# Patient Record
Sex: FEMALE | Race: BLACK OR AFRICAN AMERICAN | NOT HISPANIC OR LATINO | URBAN - METROPOLITAN AREA
[De-identification: names, ages, dates, MRNs, and addresses within clinical notes are randomized per-mention and may not be internally consistent; named-entity substitution may affect disease eponyms.]

---

## 2017-01-07 ENCOUNTER — EMERGENCY (EMERGENCY)
Facility: HOSPITAL | Age: 57
LOS: 1 days | Discharge: HOME | End: 2017-01-07
Attending: EMERGENCY MEDICINE
Payer: COMMERCIAL

## 2017-01-07 VITALS
HEART RATE: 104 BPM | WEIGHT: 225.09 LBS | TEMPERATURE: 98 F | DIASTOLIC BLOOD PRESSURE: 103 MMHG | SYSTOLIC BLOOD PRESSURE: 149 MMHG | OXYGEN SATURATION: 98 % | RESPIRATION RATE: 22 BRPM

## 2017-01-07 DIAGNOSIS — Z90.710 ACQUIRED ABSENCE OF BOTH CERVIX AND UTERUS: Chronic | ICD-10-CM

## 2017-01-07 DIAGNOSIS — Z90.49 ACQUIRED ABSENCE OF OTHER SPECIFIED PARTS OF DIGESTIVE TRACT: Chronic | ICD-10-CM

## 2017-01-07 DIAGNOSIS — Z90.710 ACQUIRED ABSENCE OF BOTH CERVIX AND UTERUS: ICD-10-CM

## 2017-01-07 DIAGNOSIS — Z90.49 ACQUIRED ABSENCE OF OTHER SPECIFIED PARTS OF DIGESTIVE TRACT: ICD-10-CM

## 2017-01-07 DIAGNOSIS — Z98.89 OTHER SPECIFIED POSTPROCEDURAL STATES: Chronic | ICD-10-CM

## 2017-01-07 DIAGNOSIS — Z98.890 OTHER SPECIFIED POSTPROCEDURAL STATES: ICD-10-CM

## 2017-01-07 DIAGNOSIS — R06.2 WHEEZING: ICD-10-CM

## 2017-01-07 DIAGNOSIS — J45.901 UNSPECIFIED ASTHMA WITH (ACUTE) EXACERBATION: ICD-10-CM

## 2017-01-07 DIAGNOSIS — I10 ESSENTIAL (PRIMARY) HYPERTENSION: ICD-10-CM

## 2017-01-07 PROCEDURE — 71045 X-RAY EXAM CHEST 1 VIEW: CPT

## 2017-01-07 PROCEDURE — 71010: CPT | Mod: 26

## 2017-01-07 PROCEDURE — 99284 EMERGENCY DEPT VISIT MOD MDM: CPT

## 2017-01-07 PROCEDURE — 94640 AIRWAY INHALATION TREATMENT: CPT

## 2017-01-07 PROCEDURE — 99284 EMERGENCY DEPT VISIT MOD MDM: CPT | Mod: 25

## 2017-01-07 RX ORDER — ALBUTEROL 90 UG/1
2.5 AEROSOL, METERED ORAL ONCE
Qty: 0 | Refills: 0 | Status: COMPLETED | OUTPATIENT
Start: 2017-01-07 | End: 2017-01-07

## 2017-01-07 RX ORDER — AZITHROMYCIN 500 MG/1
500 TABLET, FILM COATED ORAL ONCE
Qty: 0 | Refills: 0 | Status: COMPLETED | OUTPATIENT
Start: 2017-01-07 | End: 2017-01-07

## 2017-01-07 RX ORDER — IPRATROPIUM/ALBUTEROL SULFATE 18-103MCG
3 AEROSOL WITH ADAPTER (GRAM) INHALATION ONCE
Qty: 0 | Refills: 0 | Status: COMPLETED | OUTPATIENT
Start: 2017-01-07 | End: 2017-01-07

## 2017-01-07 RX ORDER — CARVEDILOL PHOSPHATE 80 MG/1
12.5 CAPSULE, EXTENDED RELEASE ORAL
Qty: 0 | Refills: 0 | COMMUNITY

## 2017-01-07 RX ADMIN — Medication 60 MILLIGRAM(S): at 22:59

## 2017-01-07 RX ADMIN — ALBUTEROL 2.5 MILLIGRAM(S): 90 AEROSOL, METERED ORAL at 21:47

## 2017-01-07 RX ADMIN — ALBUTEROL 2.5 MILLIGRAM(S): 90 AEROSOL, METERED ORAL at 21:08

## 2017-01-07 RX ADMIN — Medication 3 MILLILITER(S): at 21:08

## 2017-01-07 RX ADMIN — Medication 3 MILLILITER(S): at 21:47

## 2017-01-07 RX ADMIN — AZITHROMYCIN 500 MILLIGRAM(S): 500 TABLET, FILM COATED ORAL at 22:58

## 2017-01-07 NOTE — ED ADULT NURSE NOTE - OBJECTIVE STATEMENT
Pt states she "has shortness of breath all day, started to do some cleaning, than felt like she couldn't breathe with a cough that produced green phlegm"

## 2017-01-07 NOTE — ED PROVIDER NOTE - SHIFT CHANGE DETAILS
RECHECK AFTER BREATHING TREATMENT  HX ASTHMA  HOME NEBS WEREN'T WORKING  NEG CXR  REEVALUATE AND DISPO

## 2017-01-07 NOTE — ED PROVIDER NOTE - PROGRESS NOTE DETAILS
Pt feels improved with meds.  Lungs clear b/l with rare exp wheeze.  Pt feels well for d/c.  Rx albuterol MDI, Prednisone and Z-phillip with f/u next 1-2 days

## 2017-01-07 NOTE — ED PROVIDER NOTE - CHPI ED SYMPTOMS POS
WHEEZING/SHORTNESS OF BREATH/GREEN/CHEST CONGESTION/MUCUS PRODUCTION/DIFFICULTY BREATHING/COUGH/CHEST WALL TENDERNESS

## 2017-01-07 NOTE — ED ADULT NURSE REASSESSMENT NOTE - NS ED NURSE REASSESS COMMENT FT1
Pt continues to have wheezing on expiration on left side and clear on right after 2 neb treatments, states she is breathing easier now. Will continue to monitor.

## 2017-01-07 NOTE — ED PROVIDER NOTE - OBJECTIVE STATEMENT
PT HAS HISTORY OF ASTHMA. + DIFFICULTY BREATHING TODAY.  TOOK HER HOME NEBULZIER TREATMENT WITHOUT RELIEF AND CALLED 911 WHEN SHE FELT AS IF SHE WAS STRUGGLING. SOMEWHAT IMPROVED UPON ARRIVAL TO ER AFTER NEB TREATMENT EN ROUTE.    RECENT COUGH WITH GREEN PHLEGM PRODUCTION

## 2017-01-08 VITALS
HEART RATE: 96 BPM | SYSTOLIC BLOOD PRESSURE: 144 MMHG | TEMPERATURE: 98 F | DIASTOLIC BLOOD PRESSURE: 91 MMHG | RESPIRATION RATE: 18 BRPM | OXYGEN SATURATION: 96 %

## 2017-01-08 RX ORDER — AZITHROMYCIN 500 MG/1
1 TABLET, FILM COATED ORAL
Qty: 5 | Refills: 0
Start: 2017-01-08 | End: 2017-01-13

## 2017-01-08 RX ORDER — ALBUTEROL 90 UG/1
2 AEROSOL, METERED ORAL
Qty: 1 | Refills: 0
Start: 2017-01-08

## 2017-05-05 ENCOUNTER — APPOINTMENT (OUTPATIENT)
Dept: RHEUMATOLOGY | Facility: CLINIC | Age: 57
End: 2017-05-05

## 2017-11-07 ENCOUNTER — LABORATORY RESULT (OUTPATIENT)
Age: 57
End: 2017-11-07

## 2017-11-07 ENCOUNTER — APPOINTMENT (OUTPATIENT)
Dept: RHEUMATOLOGY | Facility: CLINIC | Age: 57
End: 2017-11-07
Payer: COMMERCIAL

## 2017-11-07 VITALS
SYSTOLIC BLOOD PRESSURE: 128 MMHG | RESPIRATION RATE: 18 BRPM | OXYGEN SATURATION: 98 % | HEART RATE: 82 BPM | WEIGHT: 225 LBS | HEIGHT: 67 IN | BODY MASS INDEX: 35.31 KG/M2 | TEMPERATURE: 98.3 F | DIASTOLIC BLOOD PRESSURE: 78 MMHG

## 2017-11-07 LAB
BILIRUB UR QL STRIP: NORMAL
CLARITY UR: CLEAR
COLLECTION METHOD: NORMAL
GLUCOSE UR-MCNC: NORMAL
HCG UR QL: 1 EU/DL
HGB UR QL STRIP.AUTO: NORMAL
KETONES UR-MCNC: NORMAL
LEUKOCYTE ESTERASE UR QL STRIP: NORMAL
NITRITE UR QL STRIP: NORMAL
PH UR STRIP: 7
PROT UR STRIP-MCNC: NORMAL
SP GR UR STRIP: 1.02

## 2017-11-07 PROCEDURE — 36415 COLL VENOUS BLD VENIPUNCTURE: CPT

## 2017-11-07 PROCEDURE — 99215 OFFICE O/P EST HI 40 MIN: CPT | Mod: 25

## 2017-11-07 PROCEDURE — 85651 RBC SED RATE NONAUTOMATED: CPT

## 2017-11-08 LAB
ALBUMIN SERPL ELPH-MCNC: 4.2 G/DL
ALP BLD-CCNC: 94 U/L
ALT SERPL-CCNC: 14 U/L
ANION GAP SERPL CALC-SCNC: 12 MMOL/L
APPEARANCE: ABNORMAL
AST SERPL-CCNC: 20 U/L
BACTERIA: ABNORMAL
BASOPHILS # BLD AUTO: 0.04 K/UL
BASOPHILS NFR BLD AUTO: 0.6 %
BILIRUB SERPL-MCNC: 0.2 MG/DL
BILIRUBIN URINE: NEGATIVE
BLOOD URINE: NEGATIVE
BUN SERPL-MCNC: 14 MG/DL
CALCIUM SERPL-MCNC: 9.5 MG/DL
CHLORIDE SERPL-SCNC: 102 MMOL/L
CK SERPL-CCNC: 301 U/L
CO2 SERPL-SCNC: 27 MMOL/L
COLOR: YELLOW
CREAT SERPL-MCNC: 0.65 MG/DL
CRP SERPL-MCNC: 1 MG/DL
ENA SS-A AB SER IA-ACNC: <0.2 AL
ENA SS-B AB SER IA-ACNC: <0.2 AL
EOSINOPHIL # BLD AUTO: 0.56 K/UL
EOSINOPHIL NFR BLD AUTO: 8.4 %
GLUCOSE QUALITATIVE U: NEGATIVE MG/DL
GLUCOSE SERPL-MCNC: 105 MG/DL
HAV IGM SER QL: NONREACTIVE
HBV CORE IGM SER QL: NONREACTIVE
HBV SURFACE AG SER QL: NONREACTIVE
HCT VFR BLD CALC: 36.7 %
HCV AB SER QL: NONREACTIVE
HCV S/CO RATIO: 0.11 S/CO
HGB BLD-MCNC: 12.4 G/DL
HYALINE CASTS: 0 /LPF
IMM GRANULOCYTES NFR BLD AUTO: 0.2 %
KETONES URINE: NEGATIVE
LEUKOCYTE ESTERASE URINE: ABNORMAL
LYMPHOCYTES # BLD AUTO: 2.7 K/UL
LYMPHOCYTES NFR BLD AUTO: 40.7 %
MAN DIFF?: NORMAL
MCHC RBC-ENTMCNC: 29.7 PG
MCHC RBC-ENTMCNC: 33.8 GM/DL
MCV RBC AUTO: 87.8 FL
MICROSCOPIC-UA: NORMAL
MONOCYTES # BLD AUTO: 0.24 K/UL
MONOCYTES NFR BLD AUTO: 3.6 %
NEUTROPHILS # BLD AUTO: 3.09 K/UL
NEUTROPHILS NFR BLD AUTO: 46.5 %
NITRITE URINE: NEGATIVE
PH URINE: 7
PHOSPHATE SERPL-MCNC: 3.1 MG/DL
PLATELET # BLD AUTO: 385 K/UL
POTASSIUM SERPL-SCNC: 4.1 MMOL/L
PROT SERPL-MCNC: 8.5 G/DL
PROTEIN URINE: NEGATIVE MG/DL
RBC # BLD: 4.18 M/UL
RBC # FLD: 13.4 %
RED BLOOD CELLS URINE: 2 /HPF
RHEUMATOID FACT SER QL: 30 IU/ML
SODIUM SERPL-SCNC: 141 MMOL/L
SPECIFIC GRAVITY URINE: 1.02
SQUAMOUS EPITHELIAL CELLS: 9 /HPF
T3 SERPL-MCNC: 144 NG/DL
T3RU NFR SERPL: 1.08 INDEX
T4 SERPL-MCNC: 8.7 UG/DL
TSH SERPL-ACNC: 4.05 UIU/ML
UROBILINOGEN URINE: 1 MG/DL
WBC # FLD AUTO: 6.64 K/UL
WESR: 44
WHITE BLOOD CELLS URINE: 5 /HPF

## 2017-11-09 LAB
ACE BLD-CCNC: 53 U/L
ANA PAT FLD IF-IMP: ABNORMAL
ANA SER IF-ACNC: ABNORMAL
CCP AB SER IA-ACNC: <8 UNITS
DEPRECATED KAPPA LC FREE/LAMBDA SER: 1.67 RATIO
FERRITIN SERPL-MCNC: 131 NG/ML
FOLATE SERPL-MCNC: 12.3 NG/ML
HAPTOGLOB SERPL-MCNC: 100 MG/DL
IGA SER QL IEP: 435 MG/DL
IGG SER QL IEP: 1900 MG/DL
IGM SER QL IEP: 95 MG/DL
IRON SATN MFR SERPL: 19 %
IRON SERPL-MCNC: 65 UG/DL
KAPPA LC CSF-MCNC: 2.86 MG/DL
KAPPA LC SERPL-MCNC: 4.78 MG/DL
M PROTEIN SPEC IFE-MCNC: NORMAL
RBC # BLD: 4.18 M/UL
RETICS # AUTO: 0.9 %
RETICS AGGREG/RBC NFR: 38.9 K/UL
RF+CCP IGG SER-IMP: NEGATIVE
TIBC SERPL-MCNC: 348 UG/DL
UIBC SERPL-MCNC: 283 UG/DL
VIT B12 SERPL-MCNC: 549 PG/ML

## 2017-11-11 LAB — 25(OH)D3 SERPL-MCNC: 31.9 NG/ML

## 2018-04-05 ENCOUNTER — APPOINTMENT (OUTPATIENT)
Dept: RHEUMATOLOGY | Facility: CLINIC | Age: 58
End: 2018-04-05

## 2020-07-13 ENCOUNTER — EMERGENCY (EMERGENCY)
Facility: HOSPITAL | Age: 60
LOS: 1 days | Discharge: HOME | End: 2020-07-13
Attending: EMERGENCY MEDICINE
Payer: COMMERCIAL

## 2020-07-13 VITALS
SYSTOLIC BLOOD PRESSURE: 187 MMHG | DIASTOLIC BLOOD PRESSURE: 113 MMHG | WEIGHT: 252.87 LBS | OXYGEN SATURATION: 96 % | TEMPERATURE: 98 F | HEART RATE: 92 BPM | RESPIRATION RATE: 24 BRPM | HEIGHT: 66.93 IN

## 2020-07-13 VITALS — SYSTOLIC BLOOD PRESSURE: 173 MMHG | HEART RATE: 87 BPM | DIASTOLIC BLOOD PRESSURE: 94 MMHG

## 2020-07-13 DIAGNOSIS — Z90.710 ACQUIRED ABSENCE OF BOTH CERVIX AND UTERUS: Chronic | ICD-10-CM

## 2020-07-13 DIAGNOSIS — Z90.49 ACQUIRED ABSENCE OF OTHER SPECIFIED PARTS OF DIGESTIVE TRACT: Chronic | ICD-10-CM

## 2020-07-13 DIAGNOSIS — Z98.89 OTHER SPECIFIED POSTPROCEDURAL STATES: Chronic | ICD-10-CM

## 2020-07-13 LAB
ALBUMIN SERPL ELPH-MCNC: 4.4 G/DL — SIGNIFICANT CHANGE UP (ref 3.3–5.2)
ALP SERPL-CCNC: 116 U/L — SIGNIFICANT CHANGE UP (ref 40–120)
ALT FLD-CCNC: 14 U/L — SIGNIFICANT CHANGE UP
ANION GAP SERPL CALC-SCNC: 14 MMOL/L — SIGNIFICANT CHANGE UP (ref 5–17)
AST SERPL-CCNC: 21 U/L — SIGNIFICANT CHANGE UP
BASOPHILS # BLD AUTO: 0.03 K/UL — SIGNIFICANT CHANGE UP (ref 0–0.2)
BASOPHILS NFR BLD AUTO: 0.3 % — SIGNIFICANT CHANGE UP (ref 0–2)
BILIRUB SERPL-MCNC: 0.4 MG/DL — SIGNIFICANT CHANGE UP (ref 0.4–2)
BUN SERPL-MCNC: 10 MG/DL — SIGNIFICANT CHANGE UP (ref 8–20)
CALCIUM SERPL-MCNC: 9.4 MG/DL — SIGNIFICANT CHANGE UP (ref 8.6–10.2)
CHLORIDE SERPL-SCNC: 98 MMOL/L — SIGNIFICANT CHANGE UP (ref 98–107)
CK MB CFR SERPL CALC: 3.6 NG/ML — SIGNIFICANT CHANGE UP (ref 0–6.7)
CK SERPL-CCNC: 330 U/L — HIGH (ref 25–170)
CO2 SERPL-SCNC: 24 MMOL/L — SIGNIFICANT CHANGE UP (ref 22–29)
CREAT SERPL-MCNC: 0.61 MG/DL — SIGNIFICANT CHANGE UP (ref 0.5–1.3)
EOSINOPHIL # BLD AUTO: 0.32 K/UL — SIGNIFICANT CHANGE UP (ref 0–0.5)
EOSINOPHIL NFR BLD AUTO: 3.7 % — SIGNIFICANT CHANGE UP (ref 0–6)
GLUCOSE SERPL-MCNC: 102 MG/DL — HIGH (ref 70–99)
HCT VFR BLD CALC: 38.3 % — SIGNIFICANT CHANGE UP (ref 34.5–45)
HGB BLD-MCNC: 12.4 G/DL — SIGNIFICANT CHANGE UP (ref 11.5–15.5)
IMM GRANULOCYTES NFR BLD AUTO: 0.3 % — SIGNIFICANT CHANGE UP (ref 0–1.5)
LYMPHOCYTES # BLD AUTO: 1.54 K/UL — SIGNIFICANT CHANGE UP (ref 1–3.3)
LYMPHOCYTES # BLD AUTO: 17.8 % — SIGNIFICANT CHANGE UP (ref 13–44)
MCHC RBC-ENTMCNC: 28.9 PG — SIGNIFICANT CHANGE UP (ref 27–34)
MCHC RBC-ENTMCNC: 32.4 GM/DL — SIGNIFICANT CHANGE UP (ref 32–36)
MCV RBC AUTO: 89.3 FL — SIGNIFICANT CHANGE UP (ref 80–100)
MONOCYTES # BLD AUTO: 0.36 K/UL — SIGNIFICANT CHANGE UP (ref 0–0.9)
MONOCYTES NFR BLD AUTO: 4.2 % — SIGNIFICANT CHANGE UP (ref 2–14)
NEUTROPHILS # BLD AUTO: 6.37 K/UL — SIGNIFICANT CHANGE UP (ref 1.8–7.4)
NEUTROPHILS NFR BLD AUTO: 73.7 % — SIGNIFICANT CHANGE UP (ref 43–77)
NT-PROBNP SERPL-SCNC: 98 PG/ML — SIGNIFICANT CHANGE UP (ref 0–300)
PLATELET # BLD AUTO: 386 K/UL — SIGNIFICANT CHANGE UP (ref 150–400)
POTASSIUM SERPL-MCNC: 3.7 MMOL/L — SIGNIFICANT CHANGE UP (ref 3.5–5.3)
POTASSIUM SERPL-SCNC: 3.7 MMOL/L — SIGNIFICANT CHANGE UP (ref 3.5–5.3)
PROT SERPL-MCNC: 8.7 G/DL — SIGNIFICANT CHANGE UP (ref 6.6–8.7)
RBC # BLD: 4.29 M/UL — SIGNIFICANT CHANGE UP (ref 3.8–5.2)
RBC # FLD: 13.3 % — SIGNIFICANT CHANGE UP (ref 10.3–14.5)
SODIUM SERPL-SCNC: 136 MMOL/L — SIGNIFICANT CHANGE UP (ref 135–145)
TROPONIN T SERPL-MCNC: <0.01 NG/ML — SIGNIFICANT CHANGE UP (ref 0–0.06)
WBC # BLD: 8.65 K/UL — SIGNIFICANT CHANGE UP (ref 3.8–10.5)
WBC # FLD AUTO: 8.65 K/UL — SIGNIFICANT CHANGE UP (ref 3.8–10.5)

## 2020-07-13 PROCEDURE — 84484 ASSAY OF TROPONIN QUANT: CPT

## 2020-07-13 PROCEDURE — 36415 COLL VENOUS BLD VENIPUNCTURE: CPT

## 2020-07-13 PROCEDURE — 80053 COMPREHEN METABOLIC PANEL: CPT

## 2020-07-13 PROCEDURE — 83880 ASSAY OF NATRIURETIC PEPTIDE: CPT

## 2020-07-13 PROCEDURE — 96375 TX/PRO/DX INJ NEW DRUG ADDON: CPT

## 2020-07-13 PROCEDURE — 96374 THER/PROPH/DIAG INJ IV PUSH: CPT

## 2020-07-13 PROCEDURE — 99285 EMERGENCY DEPT VISIT HI MDM: CPT

## 2020-07-13 PROCEDURE — 82553 CREATINE MB FRACTION: CPT

## 2020-07-13 PROCEDURE — 99285 EMERGENCY DEPT VISIT HI MDM: CPT | Mod: 25

## 2020-07-13 PROCEDURE — 94640 AIRWAY INHALATION TREATMENT: CPT

## 2020-07-13 PROCEDURE — 71045 X-RAY EXAM CHEST 1 VIEW: CPT | Mod: 26

## 2020-07-13 PROCEDURE — 85027 COMPLETE CBC AUTOMATED: CPT

## 2020-07-13 PROCEDURE — 71045 X-RAY EXAM CHEST 1 VIEW: CPT

## 2020-07-13 PROCEDURE — 93005 ELECTROCARDIOGRAM TRACING: CPT

## 2020-07-13 PROCEDURE — 82550 ASSAY OF CK (CPK): CPT

## 2020-07-13 PROCEDURE — 93010 ELECTROCARDIOGRAM REPORT: CPT

## 2020-07-13 RX ORDER — ALPRAZOLAM 0.25 MG
0.5 TABLET ORAL ONCE
Refills: 0 | Status: DISCONTINUED | OUTPATIENT
Start: 2020-07-13 | End: 2020-07-13

## 2020-07-13 RX ORDER — CARVEDILOL PHOSPHATE 80 MG/1
12.5 CAPSULE, EXTENDED RELEASE ORAL ONCE
Refills: 0 | Status: COMPLETED | OUTPATIENT
Start: 2020-07-13 | End: 2020-07-13

## 2020-07-13 RX ORDER — MAGNESIUM SULFATE 500 MG/ML
2 VIAL (ML) INJECTION ONCE
Refills: 0 | Status: COMPLETED | OUTPATIENT
Start: 2020-07-13 | End: 2020-07-13

## 2020-07-13 RX ORDER — IPRATROPIUM/ALBUTEROL SULFATE 18-103MCG
3 AEROSOL WITH ADAPTER (GRAM) INHALATION ONCE
Refills: 0 | Status: COMPLETED | OUTPATIENT
Start: 2020-07-13 | End: 2020-07-13

## 2020-07-13 RX ORDER — ALBUTEROL 90 UG/1
3 AEROSOL, METERED ORAL
Qty: 270 | Refills: 0
Start: 2020-07-13 | End: 2020-08-11

## 2020-07-13 RX ORDER — FUROSEMIDE 40 MG
1 TABLET ORAL
Qty: 30 | Refills: 0
Start: 2020-07-13 | End: 2020-08-11

## 2020-07-13 RX ORDER — TIOTROPIUM BROMIDE 18 UG/1
1 CAPSULE ORAL; RESPIRATORY (INHALATION) DAILY
Refills: 0 | Status: DISCONTINUED | OUTPATIENT
Start: 2020-07-13 | End: 2020-07-18

## 2020-07-13 RX ORDER — ALBUTEROL 90 UG/1
2 AEROSOL, METERED ORAL EVERY 6 HOURS
Refills: 0 | Status: DISCONTINUED | OUTPATIENT
Start: 2020-07-13 | End: 2020-07-18

## 2020-07-13 RX ORDER — AMLODIPINE BESYLATE 2.5 MG/1
10 TABLET ORAL ONCE
Refills: 0 | Status: COMPLETED | OUTPATIENT
Start: 2020-07-13 | End: 2020-07-13

## 2020-07-13 RX ADMIN — Medication 50 GRAM(S): at 14:27

## 2020-07-13 RX ADMIN — Medication 3 MILLILITER(S): at 15:01

## 2020-07-13 RX ADMIN — Medication 3 MILLILITER(S): at 15:00

## 2020-07-13 RX ADMIN — AMLODIPINE BESYLATE 10 MILLIGRAM(S): 2.5 TABLET ORAL at 14:15

## 2020-07-13 RX ADMIN — Medication 0.5 MILLIGRAM(S): at 14:15

## 2020-07-13 RX ADMIN — CARVEDILOL PHOSPHATE 12.5 MILLIGRAM(S): 80 CAPSULE, EXTENDED RELEASE ORAL at 16:52

## 2020-07-13 RX ADMIN — Medication 125 MILLIGRAM(S): at 14:18

## 2020-07-13 NOTE — ED PROVIDER NOTE - PMH
Blood Transfusion, Care After  These instructions give you information about caring for yourself after your procedure. Your doctor may also give you more specific instructions. Call your doctor if you have any problems or questions after your procedure.   HOME CARE  · Take medicines only as told by your doctor. Ask your doctor if you can take an over-the-counter pain reliever if you have a fever or headache a day or two after your procedure.  · Return to your normal activities as told by your doctor.  GET HELP IF:   · You develop redness or irritation at your IV site.  · You have a fever, chills, or a headache that does not go away.  · Your pee (urine) is darker than normal.  · Your urine turns:    Pink.    Red.    Brown.  · The white part of your eye turns yellow (jaundice).  · You feel weak after doing your normal activities.  GET HELP RIGHT AWAY IF:   · You have trouble breathing.  · You have fever and chills and you also have:    Anxiety.    Chest or back pain.    Flushed or pink skin.    Clammy or sweaty skin.    A fast heartbeat.    A sick feeling in your stomach (nausea).     This information is not intended to replace advice given to you by your health care provider. Make sure you discuss any questions you have with your health care provider.     Document Released: 01/08/2016 Document Reviewed: 01/08/2016  [employer] Patient Education ©2017 [employer]    
Asthma    Hypertension    Sleep apnea

## 2020-07-13 NOTE — ED PROVIDER NOTE - PATIENT PORTAL LINK FT
You can access the FollowMyHealth Patient Portal offered by [facility] by registering at the following website: [email or web address]. By joining [employer]’s FollowMyHealth portal, you will also be able to view your health information using other applications (apps) compatible with our system.

## 2020-07-13 NOTE — ED ADULT NURSE REASSESSMENT NOTE - NS ED NURSE REASSESS COMMENT FT1
please note pt given neb txs as ordered in isolation room; IV meds given as ordered through IV in left AC; bp improved; d/c instructions provided as per MD

## 2020-07-13 NOTE — ED PROVIDER NOTE - CLINICAL SUMMARY MEDICAL DECISION MAKING FREE TEXT BOX
plan to treat as acute asthma, control BP and treat for anxiety, r/o acs, pna, chf, also send covid swab

## 2020-07-13 NOTE — ED PROVIDER NOTE - NSFOLLOWUPINSTRUCTIONS_ED_ALL_ED_FT
1. take your BP meds at time  2. eat low salt diet  3. take medications as prescribed this visit in addition to your existing medications  4. return promptly in c/o worsening symptoms  5. f/u with cardiology and pulmonology in [facility] clinic on outpatient basis

## 2020-07-13 NOTE — ED PROVIDER NOTE - PROGRESS NOTE DETAILS
pt advised to continue meds for BP at home, not to skip them and also give meds for acute asthma, return promptly in c/o worsening symptoms

## 2020-07-13 NOTE — ED PROVIDER NOTE - OBJECTIVE STATEMENT
59 y/o F with h/o htn, asthma, panic attacks p/w sob since last night and took her inhaler without relief last night and this morning and now getting worsening sob, worst with laying down. Pt has no chest pain, cough, fever, chills, nausea, vomiting. Pt had similar episodes in the past when she gets sob and then had panick attacks. Pt also skipped her bp medication for 2 days.Never intubated, non smoker, no known sick contacts or recent travel

## 2020-07-13 NOTE — ED ADULT TRIAGE NOTE - CHIEF COMPLAINT QUOTE
Pt comes from urgent care, c/o SOB and HTN since last night, hx of asthma, took inhaler w/out relief last night and this morning, denies fever/chills, denies sick contacts, unable to speak in full sentences, room air O2 96%

## 2020-08-05 ENCOUNTER — APPOINTMENT (OUTPATIENT)
Dept: PULMONOLOGY | Facility: CLINIC | Age: 60
End: 2020-08-05

## 2020-09-15 ENCOUNTER — APPOINTMENT (OUTPATIENT)
Dept: PULMONOLOGY | Facility: CLINIC | Age: 60
End: 2020-09-15
Payer: COMMERCIAL

## 2020-09-15 VITALS
HEART RATE: 99 BPM | WEIGHT: 230 LBS | DIASTOLIC BLOOD PRESSURE: 98 MMHG | RESPIRATION RATE: 16 BRPM | HEIGHT: 67 IN | SYSTOLIC BLOOD PRESSURE: 160 MMHG | BODY MASS INDEX: 36.1 KG/M2 | OXYGEN SATURATION: 95 %

## 2020-09-15 DIAGNOSIS — D72.1 EOSINOPHILIA: ICD-10-CM

## 2020-09-15 PROCEDURE — 99204 OFFICE O/P NEW MOD 45 MIN: CPT

## 2020-09-15 NOTE — ASSESSMENT
[FreeTextEntry1] : Asthma of unclear severity status post exacerbation 2 months ago. For the moment she will stay on Advair and I have scheduled pulmonary function studies. Additionally an asthma profile looking for allergies or elevated IgE level has been ordered. I would obtain the home sleep study records from her primary physician. I suspect she may have more sleep apnea then maybe reported on a home sleep study. Followup will be in 3-4 weeks to review everything.

## 2020-09-15 NOTE — PHYSICAL EXAM
[No Acute Distress] : no acute distress [Low Lying Soft Palate] : low lying soft palate [III] : Mallampati Class: III [Normal Appearance] : normal appearance [Normal Rate/Rhythm] : normal rate/rhythm [No Neck Mass] : no neck mass [Normal S1, S2] : normal s1, s2 [No Murmurs] : no murmurs [No Resp Distress] : no resp distress [Clear to Auscultation Bilaterally] : clear to auscultation bilaterally [Benign] : benign [No Abnormalities] : no abnormalities [Normal Gait] : normal gait [No Clubbing] : no clubbing [No Cyanosis] : no cyanosis [No Edema] : no edema [FROM] : FROM [No Focal Deficits] : no focal deficits [Normal Color/ Pigmentation] : normal color/ pigmentation [Normal Affect] : normal affect [Oriented x3] : oriented x3 [TextBox_2] : Obese

## 2020-09-15 NOTE — CONSULT LETTER
[Dear  ___] : Dear  [unfilled], [Please see my note below.] : Please see my note below. [Consult Letter:] : I had the pleasure of evaluating your patient, [unfilled]. [Consult Closing:] : Thank you very much for allowing me to participate in the care of this patient.  If you have any questions, please do not hesitate to contact me. [Sincerely,] : Sincerely, [FreeTextEntry3] : Sonia Collins MD FCCP\par D-ABSM\par ABIM board certified in  Pulmonary diseases, Sleep medicine\par Internal medicine\par

## 2020-09-15 NOTE — HISTORY OF PRESENT ILLNESS
[TextBox_4] : The patient is a 60-year-old female who comes for evaluation of her asthma. She's had asthma since childhood with rare ER visits. In July of this year she was in the emergency room at [facility] where she was treated with nebulizers and steroids and sent home. She has a history of elevated sedimentation rate and peripheral eosinophilia and was undergoing workup by rheumatology about 3 years ago. No specific diagnosis was made. The patient reports having hayfever but she doesn't think it bothers her asthma much. She's been maintained on Advair and albuterol rescue inhaler. She feels that her breathing has been suboptimal since the emergency room visit. Her weight fluctuates. She is obese but has lost about 25 pounds. She snores when she is heavier. She had a home sleep study done by her primary physician several months ago and was told that it was "borderline". She denies excessive daytime sleepiness.

## 2020-10-02 ENCOUNTER — APPOINTMENT (OUTPATIENT)
Dept: DISASTER EMERGENCY | Facility: CLINIC | Age: 60
End: 2020-10-02

## 2020-10-02 DIAGNOSIS — Z01.818 ENCOUNTER FOR OTHER PREPROCEDURAL EXAMINATION: ICD-10-CM

## 2020-10-03 LAB — SARS-COV-2 N GENE NPH QL NAA+PROBE: NOT DETECTED

## 2020-10-05 ENCOUNTER — APPOINTMENT (OUTPATIENT)
Dept: PULMONOLOGY | Facility: CLINIC | Age: 60
End: 2020-10-05
Payer: COMMERCIAL

## 2020-10-05 VITALS
HEART RATE: 78 BPM | BODY MASS INDEX: 37.77 KG/M2 | DIASTOLIC BLOOD PRESSURE: 90 MMHG | SYSTOLIC BLOOD PRESSURE: 148 MMHG | HEIGHT: 66 IN | OXYGEN SATURATION: 95 % | WEIGHT: 235 LBS

## 2020-10-05 VITALS — BODY MASS INDEX: 37.77 KG/M2 | HEIGHT: 66 IN | WEIGHT: 235 LBS

## 2020-10-05 VITALS — RESPIRATION RATE: 16 BRPM

## 2020-10-05 PROCEDURE — 99214 OFFICE O/P EST MOD 30 MIN: CPT | Mod: 25

## 2020-10-05 PROCEDURE — 94010 BREATHING CAPACITY TEST: CPT

## 2020-10-05 PROCEDURE — 94727 GAS DIL/WSHOT DETER LNG VOL: CPT

## 2020-10-05 PROCEDURE — 85018 HEMOGLOBIN: CPT | Mod: QW

## 2020-10-05 NOTE — PROCEDURE
[FreeTextEntry1] : Pulmonary function studies show severe obstruction with restriction. Vital capacity is 42% predicted and FEV1 is 40%. She was unable to perform diffusing capacity. Obesity related volume changes were noted.

## 2020-10-05 NOTE — HISTORY OF PRESENT ILLNESS
[TextBox_4] : The patient came in for pulmonary function studies today. She got some of her blood work done. Had not received a sleep study yet. She reports ongoing shortness of breath. She is using albuterol.

## 2020-10-05 NOTE — ASSESSMENT
[FreeTextEntry1] : Patient appears to have moderate to severe persistent asthma. I placed her on high-dose Breo, and montelukast. I have reordered eosinophil count and total IgE level. I will see her back in 3 weeks to determine the next. I will probably put her on a biological agents but I need to see additional lab data to figure out which one.\par \par Sleep study record has been requested again.

## 2020-10-05 NOTE — PHYSICAL EXAM
[No Acute Distress] : no acute distress [Low Lying Soft Palate] : low lying soft palate [III] : Mallampati Class: III [Normal Appearance] : normal appearance [No Neck Mass] : no neck mass [Normal Rate/Rhythm] : normal rate/rhythm [Normal S1, S2] : normal s1, s2 [No Murmurs] : no murmurs [No Resp Distress] : no resp distress [Clear to Auscultation Bilaterally] : clear to auscultation bilaterally [No Abnormalities] : no abnormalities [Benign] : benign [Normal Gait] : normal gait [No Clubbing] : no clubbing [No Cyanosis] : no cyanosis [No Edema] : no edema [FROM] : FROM [Normal Color/ Pigmentation] : normal color/ pigmentation [No Focal Deficits] : no focal deficits [Oriented x3] : oriented x3 [Normal Affect] : normal affect [TextBox_2] : Obese

## 2020-11-27 NOTE — ED ADULT NURSE NOTE - NS ED NURSE RECORD ANOTHER HT AND WT
[place] [facility] [place] with Gluc 626, HCO3 16, AG 16, BHB 4.2, ketonuria. 2L IVF given in ED with insulin gtt started. Glucose trended downward to 274. Pt was then admitted to hospital medicine for further management of DKA.    - NPO   - LR IVF at 125/hr  - Insulin gtt   - BMP q4   - Will replace K PRN  - Accuchecks q1  - Nausea: zofran PRN    Yes

## 2021-01-06 ENCOUNTER — APPOINTMENT (OUTPATIENT)
Dept: PULMONOLOGY | Facility: CLINIC | Age: 61
End: 2021-01-06
Payer: COMMERCIAL

## 2021-01-06 VITALS
DIASTOLIC BLOOD PRESSURE: 74 MMHG | SYSTOLIC BLOOD PRESSURE: 134 MMHG | WEIGHT: 243 LBS | HEIGHT: 66 IN | BODY MASS INDEX: 39.05 KG/M2 | RESPIRATION RATE: 16 BRPM

## 2021-01-06 PROCEDURE — 99072 ADDL SUPL MATRL&STAF TM PHE: CPT

## 2021-01-06 PROCEDURE — 99214 OFFICE O/P EST MOD 30 MIN: CPT

## 2021-01-06 NOTE — HISTORY OF PRESENT ILLNESS
[TextBox_4] : Patient with probable severe persistent asthma. She has elevated allergens but we do not have a total IgE level or an eosinophil count. This was reordered today. She feels that she is breathing better with Breo and montelukast. She has not had a sleep study done recently but in the past it was "borderline".

## 2021-01-06 NOTE — ASSESSMENT
[FreeTextEntry1] : Patient with severe persistent asthma probable allergic phenotype. I've ordered a eosinophil count and IgE level again which will be done later today. Repeat spirometry has been ordered as the patient is feeling somewhat better on her current medications. I will see her back in 2 months to review everything. Depending on her profile, she will probably be placed on a biologic agent.

## 2021-06-28 ENCOUNTER — APPOINTMENT (OUTPATIENT)
Dept: PULMONOLOGY | Facility: CLINIC | Age: 61
End: 2021-06-28

## 2022-02-17 ENCOUNTER — INPATIENT (INPATIENT)
Facility: HOSPITAL | Age: 62
LOS: 6 days | Discharge: HOME | DRG: 637 | End: 2022-02-24
Attending: INTERNAL MEDICINE | Admitting: HOSPITALIST
Payer: COMMERCIAL

## 2022-02-17 VITALS
SYSTOLIC BLOOD PRESSURE: 143 MMHG | WEIGHT: 251.11 LBS | DIASTOLIC BLOOD PRESSURE: 97 MMHG | HEART RATE: 120 BPM | RESPIRATION RATE: 20 BRPM | OXYGEN SATURATION: 97 % | TEMPERATURE: 98 F | HEIGHT: 67 IN

## 2022-02-17 DIAGNOSIS — Z98.89 OTHER SPECIFIED POSTPROCEDURAL STATES: Chronic | ICD-10-CM

## 2022-02-17 DIAGNOSIS — Z90.49 ACQUIRED ABSENCE OF OTHER SPECIFIED PARTS OF DIGESTIVE TRACT: Chronic | ICD-10-CM

## 2022-02-17 DIAGNOSIS — Z90.710 ACQUIRED ABSENCE OF BOTH CERVIX AND UTERUS: Chronic | ICD-10-CM

## 2022-02-17 LAB
A1C WITH ESTIMATED AVERAGE GLUCOSE RESULT: 14.7 % — HIGH (ref 4–5.6)
BASE EXCESS BLDV CALC-SCNC: -3 MMOL/L — LOW (ref -2–3)
BASOPHILS # BLD AUTO: 0.01 K/UL — SIGNIFICANT CHANGE UP (ref 0–0.2)
BASOPHILS NFR BLD AUTO: 0.1 % — SIGNIFICANT CHANGE UP (ref 0–2)
CA-I SERPL-SCNC: 1.27 MMOL/L — SIGNIFICANT CHANGE UP (ref 1.15–1.33)
CHLORIDE BLDV-SCNC: 94 MMOL/L — LOW (ref 98–107)
EOSINOPHIL # BLD AUTO: 0 K/UL — SIGNIFICANT CHANGE UP (ref 0–0.5)
EOSINOPHIL NFR BLD AUTO: 0 % — SIGNIFICANT CHANGE UP (ref 0–6)
ESTIMATED AVERAGE GLUCOSE: 375 MG/DL — HIGH (ref 68–114)
GAS PNL BLDV: 142 MMOL/L — SIGNIFICANT CHANGE UP (ref 136–145)
GAS PNL BLDV: SIGNIFICANT CHANGE UP
GLUCOSE BLDV-MCNC: >656 MG/DL — CRITICAL HIGH (ref 70–99)
HCO3 BLDV-SCNC: 23 MMOL/L — SIGNIFICANT CHANGE UP (ref 22–29)
HCT VFR BLD CALC: 47.9 % — HIGH (ref 34.5–45)
HCT VFR BLDA CALC: 48 % — SIGNIFICANT CHANGE UP
HGB BLD CALC-MCNC: 15.9 G/DL — SIGNIFICANT CHANGE UP (ref 11.7–16.1)
HGB BLD-MCNC: 15.5 G/DL — SIGNIFICANT CHANGE UP (ref 11.5–15.5)
IMM GRANULOCYTES NFR BLD AUTO: 0.4 % — SIGNIFICANT CHANGE UP (ref 0–1.5)
LACTATE BLDV-MCNC: 4.3 MMOL/L — CRITICAL HIGH (ref 0.5–2)
LYMPHOCYTES # BLD AUTO: 0.62 K/UL — LOW (ref 1–3.3)
LYMPHOCYTES # BLD AUTO: 8.3 % — LOW (ref 13–44)
MCHC RBC-ENTMCNC: 29.8 PG — SIGNIFICANT CHANGE UP (ref 27–34)
MCHC RBC-ENTMCNC: 32.4 GM/DL — SIGNIFICANT CHANGE UP (ref 32–36)
MCV RBC AUTO: 92.1 FL — SIGNIFICANT CHANGE UP (ref 80–100)
MONOCYTES # BLD AUTO: 0.37 K/UL — SIGNIFICANT CHANGE UP (ref 0–0.9)
MONOCYTES NFR BLD AUTO: 5 % — SIGNIFICANT CHANGE UP (ref 2–14)
NEUTROPHILS # BLD AUTO: 6.42 K/UL — SIGNIFICANT CHANGE UP (ref 1.8–7.4)
NEUTROPHILS NFR BLD AUTO: 86.2 % — HIGH (ref 43–77)
OSMOLALITY SERPL: 402 MOSMOL/KG — HIGH (ref 280–301)
PCO2 BLDV: 41 MMHG — SIGNIFICANT CHANGE UP (ref 39–42)
PH BLDV: 7.35 — SIGNIFICANT CHANGE UP (ref 7.32–7.43)
PLATELET # BLD AUTO: 368 K/UL — SIGNIFICANT CHANGE UP (ref 150–400)
PO2 BLDV: 116 MMHG — HIGH (ref 25–45)
POTASSIUM BLDV-SCNC: 5.6 MMOL/L — HIGH (ref 3.5–5.1)
RBC # BLD: 5.2 M/UL — SIGNIFICANT CHANGE UP (ref 3.8–5.2)
RBC # FLD: 13.2 % — SIGNIFICANT CHANGE UP (ref 10.3–14.5)
SAO2 % BLDV: 97.5 % — SIGNIFICANT CHANGE UP
TROPONIN T SERPL-MCNC: <0.01 NG/ML — SIGNIFICANT CHANGE UP (ref 0–0.06)
WBC # BLD: 7.45 K/UL — SIGNIFICANT CHANGE UP (ref 3.8–10.5)
WBC # FLD AUTO: 7.45 K/UL — SIGNIFICANT CHANGE UP (ref 3.8–10.5)

## 2022-02-17 PROCEDURE — 99053 MED SERV 10PM-8AM 24 HR FAC: CPT

## 2022-02-17 PROCEDURE — 99291 CRITICAL CARE FIRST HOUR: CPT

## 2022-02-17 RX ORDER — SODIUM CHLORIDE 9 MG/ML
2000 INJECTION, SOLUTION INTRAVENOUS ONCE
Refills: 0 | Status: COMPLETED | OUTPATIENT
Start: 2022-02-17 | End: 2022-02-17

## 2022-02-17 RX ADMIN — SODIUM CHLORIDE 2000 MILLILITER(S): 9 INJECTION, SOLUTION INTRAVENOUS at 23:56

## 2022-02-17 NOTE — ED ADULT TRIAGE NOTE - CHIEF COMPLAINT QUOTE
patient brought in by daughter states that she "is not feeling well" complaining of abdominal pain N/V not eating

## 2022-02-18 DIAGNOSIS — E11.00 TYPE 2 DIABETES MELLITUS WITH HYPEROSMOLARITY WITHOUT NONKETOTIC HYPERGLYCEMIC-HYPEROSMOLAR COMA (NKHHC): ICD-10-CM

## 2022-02-18 LAB
ALBUMIN SERPL ELPH-MCNC: 4.3 G/DL — SIGNIFICANT CHANGE UP (ref 3.3–5.2)
ALP SERPL-CCNC: 157 U/L — HIGH (ref 40–120)
ALT FLD-CCNC: 22 U/L — SIGNIFICANT CHANGE UP
ANION GAP SERPL CALC-SCNC: 12 MMOL/L — SIGNIFICANT CHANGE UP (ref 5–17)
ANION GAP SERPL CALC-SCNC: 13 MMOL/L — SIGNIFICANT CHANGE UP (ref 5–17)
ANION GAP SERPL CALC-SCNC: 30 MMOL/L — HIGH (ref 5–17)
ANION GAP SERPL CALC-SCNC: 30 MMOL/L — HIGH (ref 5–17)
ANION GAP SERPL CALC-SCNC: 31 MMOL/L — HIGH (ref 5–17)
APAP SERPL-MCNC: <3 UG/ML — LOW (ref 10–26)
APPEARANCE UR: CLEAR — SIGNIFICANT CHANGE UP
AST SERPL-CCNC: 15 U/L — SIGNIFICANT CHANGE UP
B-OH-BUTYR SERPL-SCNC: 7.4 MMOL/L — HIGH
BILIRUB SERPL-MCNC: 0.3 MG/DL — LOW (ref 0.4–2)
BILIRUB UR-MCNC: NEGATIVE — SIGNIFICANT CHANGE UP
BUN SERPL-MCNC: 32 MG/DL — HIGH (ref 8–20)
BUN SERPL-MCNC: 33.2 MG/DL — HIGH (ref 8–20)
BUN SERPL-MCNC: 33.7 MG/DL — HIGH (ref 8–20)
BUN SERPL-MCNC: 33.8 MG/DL — HIGH (ref 8–20)
BUN SERPL-MCNC: 34.1 MG/DL — HIGH (ref 8–20)
CALCIUM SERPL-MCNC: 10.1 MG/DL — SIGNIFICANT CHANGE UP (ref 8.6–10.2)
CALCIUM SERPL-MCNC: 10.4 MG/DL — HIGH (ref 8.6–10.2)
CALCIUM SERPL-MCNC: 10.5 MG/DL — HIGH (ref 8.6–10.2)
CALCIUM SERPL-MCNC: 10.7 MG/DL — HIGH (ref 8.6–10.2)
CALCIUM SERPL-MCNC: 11.1 MG/DL — HIGH (ref 8.6–10.2)
CHLORIDE SERPL-SCNC: 108 MMOL/L — HIGH (ref 98–107)
CHLORIDE SERPL-SCNC: 110 MMOL/L — HIGH (ref 98–107)
CHLORIDE SERPL-SCNC: 90 MMOL/L — LOW (ref 98–107)
CHLORIDE SERPL-SCNC: 92 MMOL/L — LOW (ref 98–107)
CHLORIDE SERPL-SCNC: 92 MMOL/L — LOW (ref 98–107)
CO2 SERPL-SCNC: 19 MMOL/L — LOW (ref 22–29)
CO2 SERPL-SCNC: 20 MMOL/L — LOW (ref 22–29)
CO2 SERPL-SCNC: 20 MMOL/L — LOW (ref 22–29)
CO2 SERPL-SCNC: 29 MMOL/L — SIGNIFICANT CHANGE UP (ref 22–29)
CO2 SERPL-SCNC: 31 MMOL/L — HIGH (ref 22–29)
COLOR SPEC: YELLOW — SIGNIFICANT CHANGE UP
CREAT ?TM UR-MCNC: 24 MG/DL — SIGNIFICANT CHANGE UP
CREAT SERPL-MCNC: 1.31 MG/DL — HIGH (ref 0.5–1.3)
CREAT SERPL-MCNC: 1.44 MG/DL — HIGH (ref 0.5–1.3)
CREAT SERPL-MCNC: 1.55 MG/DL — HIGH (ref 0.5–1.3)
CREAT SERPL-MCNC: 1.59 MG/DL — HIGH (ref 0.5–1.3)
CREAT SERPL-MCNC: 1.63 MG/DL — HIGH (ref 0.5–1.3)
DIFF PNL FLD: NEGATIVE — SIGNIFICANT CHANGE UP
ETHANOL SERPL-MCNC: <10 MG/DL — SIGNIFICANT CHANGE UP (ref 0–9)
GLUCOSE BLDC GLUCOMTR-MCNC: 203 MG/DL — HIGH (ref 70–99)
GLUCOSE BLDC GLUCOMTR-MCNC: 221 MG/DL — HIGH (ref 70–99)
GLUCOSE BLDC GLUCOMTR-MCNC: 253 MG/DL — HIGH (ref 70–99)
GLUCOSE BLDC GLUCOMTR-MCNC: 297 MG/DL — HIGH (ref 70–99)
GLUCOSE BLDC GLUCOMTR-MCNC: 299 MG/DL — HIGH (ref 70–99)
GLUCOSE BLDC GLUCOMTR-MCNC: 313 MG/DL — HIGH (ref 70–99)
GLUCOSE BLDC GLUCOMTR-MCNC: 332 MG/DL — HIGH (ref 70–99)
GLUCOSE BLDC GLUCOMTR-MCNC: 409 MG/DL — HIGH (ref 70–99)
GLUCOSE BLDC GLUCOMTR-MCNC: 518 MG/DL — CRITICAL HIGH (ref 70–99)
GLUCOSE BLDC GLUCOMTR-MCNC: >530 MG/DL — CRITICAL HIGH (ref 70–99)
GLUCOSE SERPL-MCNC: 1179 MG/DL — CRITICAL HIGH (ref 70–99)
GLUCOSE SERPL-MCNC: 1192 MG/DL — CRITICAL HIGH (ref 70–99)
GLUCOSE SERPL-MCNC: 1302 MG/DL — CRITICAL HIGH (ref 70–99)
GLUCOSE SERPL-MCNC: 414 MG/DL — HIGH (ref 70–99)
GLUCOSE SERPL-MCNC: 475 MG/DL — CRITICAL HIGH (ref 70–99)
GLUCOSE UR QL: 1000 MG/DL
KETONES UR-MCNC: ABNORMAL
LACTATE BLDV-MCNC: 4.3 MMOL/L — CRITICAL HIGH (ref 0.5–2)
LEUKOCYTE ESTERASE UR-ACNC: NEGATIVE — SIGNIFICANT CHANGE UP
LIDOCAIN IGE QN: 92 U/L — HIGH (ref 22–51)
MAGNESIUM SERPL-MCNC: 3.4 MG/DL — HIGH (ref 1.6–2.6)
MAGNESIUM SERPL-MCNC: 3.7 MG/DL — HIGH (ref 1.6–2.6)
MAGNESIUM SERPL-MCNC: 3.7 MG/DL — HIGH (ref 1.6–2.6)
MAGNESIUM SERPL-MCNC: 3.8 MG/DL — HIGH (ref 1.6–2.6)
NITRITE UR-MCNC: NEGATIVE — SIGNIFICANT CHANGE UP
OSMOLALITY UR: 600 MOSM/KG — SIGNIFICANT CHANGE UP (ref 300–1000)
PH UR: 5 — SIGNIFICANT CHANGE UP (ref 5–8)
PHOSPHATE SERPL-MCNC: 2.3 MG/DL — LOW (ref 2.4–4.7)
PHOSPHATE SERPL-MCNC: 6.5 MG/DL — HIGH (ref 2.4–4.7)
PHOSPHATE SERPL-MCNC: 6.6 MG/DL — HIGH (ref 2.4–4.7)
POTASSIUM SERPL-MCNC: 4.2 MMOL/L — SIGNIFICANT CHANGE UP (ref 3.5–5.3)
POTASSIUM SERPL-MCNC: 4.8 MMOL/L — SIGNIFICANT CHANGE UP (ref 3.5–5.3)
POTASSIUM SERPL-MCNC: 4.9 MMOL/L — SIGNIFICANT CHANGE UP (ref 3.5–5.3)
POTASSIUM SERPL-MCNC: 5.3 MMOL/L — SIGNIFICANT CHANGE UP (ref 3.5–5.3)
POTASSIUM SERPL-MCNC: 6 MMOL/L — HIGH (ref 3.5–5.3)
POTASSIUM SERPL-SCNC: 4.2 MMOL/L — SIGNIFICANT CHANGE UP (ref 3.5–5.3)
POTASSIUM SERPL-SCNC: 4.8 MMOL/L — SIGNIFICANT CHANGE UP (ref 3.5–5.3)
POTASSIUM SERPL-SCNC: 4.9 MMOL/L — SIGNIFICANT CHANGE UP (ref 3.5–5.3)
POTASSIUM SERPL-SCNC: 5.3 MMOL/L — SIGNIFICANT CHANGE UP (ref 3.5–5.3)
POTASSIUM SERPL-SCNC: 6 MMOL/L — HIGH (ref 3.5–5.3)
PROT ?TM UR-MCNC: <4 MG/DL — SIGNIFICANT CHANGE UP (ref 0–12)
PROT SERPL-MCNC: 8.9 G/DL — HIGH (ref 6.6–8.7)
PROT UR-MCNC: NEGATIVE — SIGNIFICANT CHANGE UP
RAPID RVP RESULT: SIGNIFICANT CHANGE UP
SALICYLATES SERPL-MCNC: <0.6 MG/DL — LOW (ref 10–20)
SARS-COV-2 RNA SPEC QL NAA+PROBE: SIGNIFICANT CHANGE UP
SODIUM SERPL-SCNC: 140 MMOL/L — SIGNIFICANT CHANGE UP (ref 135–145)
SODIUM SERPL-SCNC: 142 MMOL/L — SIGNIFICANT CHANGE UP (ref 135–145)
SODIUM SERPL-SCNC: 142 MMOL/L — SIGNIFICANT CHANGE UP (ref 135–145)
SODIUM SERPL-SCNC: 151 MMOL/L — HIGH (ref 135–145)
SODIUM SERPL-SCNC: 152 MMOL/L — HIGH (ref 135–145)
SODIUM UR-SCNC: <30 MMOL/L — SIGNIFICANT CHANGE UP
SP GR SPEC: 1.01 — SIGNIFICANT CHANGE UP (ref 1.01–1.02)
TSH SERPL-MCNC: 0.86 UIU/ML — SIGNIFICANT CHANGE UP (ref 0.27–4.2)
UROBILINOGEN FLD QL: NEGATIVE MG/DL — SIGNIFICANT CHANGE UP
UUN UR-MCNC: 172 MG/DL — SIGNIFICANT CHANGE UP

## 2022-02-18 PROCEDURE — 99233 SBSQ HOSP IP/OBS HIGH 50: CPT

## 2022-02-18 PROCEDURE — 70450 CT HEAD/BRAIN W/O DYE: CPT | Mod: 26

## 2022-02-18 PROCEDURE — 74176 CT ABD & PELVIS W/O CONTRAST: CPT | Mod: 26

## 2022-02-18 PROCEDURE — 71250 CT THORAX DX C-: CPT | Mod: 26

## 2022-02-18 PROCEDURE — 99497 ADVNCD CARE PLAN 30 MIN: CPT

## 2022-02-18 PROCEDURE — 76775 US EXAM ABDO BACK WALL LIM: CPT | Mod: 26

## 2022-02-18 PROCEDURE — 72125 CT NECK SPINE W/O DYE: CPT | Mod: 26

## 2022-02-18 PROCEDURE — 99255 IP/OBS CONSLTJ NEW/EST HI 80: CPT

## 2022-02-18 RX ORDER — CARVEDILOL PHOSPHATE 80 MG/1
1 CAPSULE, EXTENDED RELEASE ORAL
Qty: 0 | Refills: 0 | DISCHARGE

## 2022-02-18 RX ORDER — DEXTROSE MONOHYDRATE, SODIUM CHLORIDE, AND POTASSIUM CHLORIDE 50; .745; 4.5 G/1000ML; G/1000ML; G/1000ML
1000 INJECTION, SOLUTION INTRAVENOUS
Refills: 0 | Status: DISCONTINUED | OUTPATIENT
Start: 2022-02-18 | End: 2022-02-18

## 2022-02-18 RX ORDER — INSULIN GLARGINE 100 [IU]/ML
50 INJECTION, SOLUTION SUBCUTANEOUS ONCE
Refills: 0 | Status: COMPLETED | OUTPATIENT
Start: 2022-02-18 | End: 2022-02-18

## 2022-02-18 RX ORDER — INSULIN GLARGINE 100 [IU]/ML
50 INJECTION, SOLUTION SUBCUTANEOUS AT BEDTIME
Refills: 0 | Status: DISCONTINUED | OUTPATIENT
Start: 2022-02-19 | End: 2022-02-19

## 2022-02-18 RX ORDER — AMLODIPINE BESYLATE 2.5 MG/1
10 TABLET ORAL DAILY
Refills: 0 | Status: DISCONTINUED | OUTPATIENT
Start: 2022-02-18 | End: 2022-02-24

## 2022-02-18 RX ORDER — SODIUM CHLORIDE 9 MG/ML
2000 INJECTION, SOLUTION INTRAVENOUS ONCE
Refills: 0 | Status: COMPLETED | OUTPATIENT
Start: 2022-02-18 | End: 2022-02-18

## 2022-02-18 RX ORDER — ONDANSETRON 8 MG/1
4 TABLET, FILM COATED ORAL ONCE
Refills: 0 | Status: COMPLETED | OUTPATIENT
Start: 2022-02-18 | End: 2022-02-18

## 2022-02-18 RX ORDER — DEXTROSE 50 % IN WATER 50 %
12.5 SYRINGE (ML) INTRAVENOUS ONCE
Refills: 0 | Status: DISCONTINUED | OUTPATIENT
Start: 2022-02-18 | End: 2022-02-24

## 2022-02-18 RX ORDER — SODIUM CHLORIDE 9 MG/ML
1000 INJECTION, SOLUTION INTRAVENOUS
Refills: 0 | Status: DISCONTINUED | OUTPATIENT
Start: 2022-02-18 | End: 2022-02-24

## 2022-02-18 RX ORDER — INSULIN GLARGINE 100 [IU]/ML
50 INJECTION, SOLUTION SUBCUTANEOUS EVERY MORNING
Refills: 0 | Status: DISCONTINUED | OUTPATIENT
Start: 2022-02-19 | End: 2022-02-24

## 2022-02-18 RX ORDER — SODIUM CHLORIDE 9 MG/ML
1000 INJECTION, SOLUTION INTRAVENOUS
Refills: 0 | Status: DISCONTINUED | OUTPATIENT
Start: 2022-02-18 | End: 2022-02-18

## 2022-02-18 RX ORDER — AMLODIPINE BESYLATE 2.5 MG/1
1 TABLET ORAL
Qty: 0 | Refills: 0 | DISCHARGE

## 2022-02-18 RX ORDER — FLUTICASONE FUROATE AND VILANTEROL TRIFENATATE 100; 25 UG/1; UG/1
1 POWDER RESPIRATORY (INHALATION)
Qty: 0 | Refills: 0 | DISCHARGE

## 2022-02-18 RX ORDER — INSULIN LISPRO 100/ML
10 VIAL (ML) SUBCUTANEOUS
Refills: 0 | Status: DISCONTINUED | OUTPATIENT
Start: 2022-02-18 | End: 2022-02-19

## 2022-02-18 RX ORDER — INSULIN LISPRO 100/ML
VIAL (ML) SUBCUTANEOUS
Refills: 0 | Status: DISCONTINUED | OUTPATIENT
Start: 2022-02-18 | End: 2022-02-24

## 2022-02-18 RX ORDER — CHLORHEXIDINE GLUCONATE 213 G/1000ML
1 SOLUTION TOPICAL
Refills: 0 | Status: DISCONTINUED | OUTPATIENT
Start: 2022-02-18 | End: 2022-02-24

## 2022-02-18 RX ORDER — CARVEDILOL PHOSPHATE 80 MG/1
6.25 CAPSULE, EXTENDED RELEASE ORAL EVERY 12 HOURS
Refills: 0 | Status: DISCONTINUED | OUTPATIENT
Start: 2022-02-18 | End: 2022-02-24

## 2022-02-18 RX ORDER — INSULIN HUMAN 100 [IU]/ML
11 INJECTION, SOLUTION SUBCUTANEOUS
Qty: 100 | Refills: 0 | Status: DISCONTINUED | OUTPATIENT
Start: 2022-02-18 | End: 2022-02-18

## 2022-02-18 RX ORDER — FAMOTIDINE 10 MG/ML
20 INJECTION INTRAVENOUS EVERY 12 HOURS
Refills: 0 | Status: DISCONTINUED | OUTPATIENT
Start: 2022-02-18 | End: 2022-02-23

## 2022-02-18 RX ORDER — DEXTROSE 50 % IN WATER 50 %
25 SYRINGE (ML) INTRAVENOUS ONCE
Refills: 0 | Status: DISCONTINUED | OUTPATIENT
Start: 2022-02-18 | End: 2022-02-24

## 2022-02-18 RX ORDER — MONTELUKAST 4 MG/1
1 TABLET, CHEWABLE ORAL
Qty: 0 | Refills: 0 | DISCHARGE

## 2022-02-18 RX ORDER — DEXTROSE 50 % IN WATER 50 %
15 SYRINGE (ML) INTRAVENOUS ONCE
Refills: 0 | Status: DISCONTINUED | OUTPATIENT
Start: 2022-02-18 | End: 2022-02-24

## 2022-02-18 RX ORDER — CARVEDILOL PHOSPHATE 80 MG/1
12.5 CAPSULE, EXTENDED RELEASE ORAL
Qty: 0 | Refills: 0 | DISCHARGE

## 2022-02-18 RX ORDER — ALBUTEROL 90 UG/1
2 AEROSOL, METERED ORAL
Qty: 0 | Refills: 0 | DISCHARGE

## 2022-02-18 RX ORDER — GLUCAGON INJECTION, SOLUTION 0.5 MG/.1ML
1 INJECTION, SOLUTION SUBCUTANEOUS ONCE
Refills: 0 | Status: DISCONTINUED | OUTPATIENT
Start: 2022-02-18 | End: 2022-02-24

## 2022-02-18 RX ORDER — HEPARIN SODIUM 5000 [USP'U]/ML
5000 INJECTION INTRAVENOUS; SUBCUTANEOUS EVERY 8 HOURS
Refills: 0 | Status: DISCONTINUED | OUTPATIENT
Start: 2022-02-18 | End: 2022-02-24

## 2022-02-18 RX ADMIN — FAMOTIDINE 20 MILLIGRAM(S): 10 INJECTION INTRAVENOUS at 17:20

## 2022-02-18 RX ADMIN — CHLORHEXIDINE GLUCONATE 1 APPLICATION(S): 213 SOLUTION TOPICAL at 06:04

## 2022-02-18 RX ADMIN — CARVEDILOL PHOSPHATE 6.25 MILLIGRAM(S): 80 CAPSULE, EXTENDED RELEASE ORAL at 22:12

## 2022-02-18 RX ADMIN — HEPARIN SODIUM 5000 UNIT(S): 5000 INJECTION INTRAVENOUS; SUBCUTANEOUS at 13:06

## 2022-02-18 RX ADMIN — Medication 4: at 17:31

## 2022-02-18 RX ADMIN — HEPARIN SODIUM 5000 UNIT(S): 5000 INJECTION INTRAVENOUS; SUBCUTANEOUS at 22:11

## 2022-02-18 RX ADMIN — Medication 8: at 22:12

## 2022-02-18 RX ADMIN — AMLODIPINE BESYLATE 10 MILLIGRAM(S): 2.5 TABLET ORAL at 17:20

## 2022-02-18 RX ADMIN — HEPARIN SODIUM 5000 UNIT(S): 5000 INJECTION INTRAVENOUS; SUBCUTANEOUS at 06:04

## 2022-02-18 RX ADMIN — INSULIN GLARGINE 50 UNIT(S): 100 INJECTION, SOLUTION SUBCUTANEOUS at 16:28

## 2022-02-18 RX ADMIN — SODIUM CHLORIDE 2000 MILLILITER(S): 9 INJECTION, SOLUTION INTRAVENOUS at 01:37

## 2022-02-18 RX ADMIN — FAMOTIDINE 20 MILLIGRAM(S): 10 INJECTION INTRAVENOUS at 06:04

## 2022-02-18 RX ADMIN — SODIUM CHLORIDE 75 MILLILITER(S): 9 INJECTION, SOLUTION INTRAVENOUS at 18:55

## 2022-02-18 RX ADMIN — DEXTROSE MONOHYDRATE, SODIUM CHLORIDE, AND POTASSIUM CHLORIDE 200 MILLILITER(S): 50; .745; 4.5 INJECTION, SOLUTION INTRAVENOUS at 01:45

## 2022-02-18 RX ADMIN — INSULIN HUMAN 11 UNIT(S)/HR: 100 INJECTION, SOLUTION SUBCUTANEOUS at 01:14

## 2022-02-18 RX ADMIN — Medication 10 UNIT(S): at 18:55

## 2022-02-18 NOTE — PATIENT PROFILE ADULT - FUNCTIONAL ASSESSMENT - BASIC MOBILITY 6.
[facility]    [address]    [place] [address]    Phone:  [phone]    Fax:  [phone]                                       After Visit Summary   12/11/2017    Oliva Nj    MRN: [id number]           After Visit Summary Signature Page     I have received my discharge instructions, and my questions have been answered. I have discussed any challenges I see with this plan with the nurse or doctor.    ..........................................................................................................................................  Patient/Patient Representative Signature      ..........................................................................................................................................  Patient Representative Print Name and Relationship to Patient    ..................................................               ................................................  Date                                            Time    ..........................................................................................................................................  Reviewed by Signature/Title    ...................................................              ..............................................  Date                                                            Time           3 = A little assistance

## 2022-02-18 NOTE — ED PROVIDER NOTE - OBJECTIVE STATEMENT
61 year old female with PMH of HTN, asthma, VINICIUS presents to ED c/o abdominal pain. Pt states she has had increasing abd pain x 10 days, last BM 10 days ago. upon questioning pt she appears confused and is unable to answer all questions appropriately. as per  at bedside she has been increasingly confused, last seen well more than 12 hours ago. she denies chest pain, sob, fever/chills, n/v/d, n/v/d.

## 2022-02-18 NOTE — PROGRESS NOTE ADULT - SUBJECTIVE AND OBJECTIVE BOX
Patient is a 61y old  Female who presents with a chief complaint of DKA (2022 15:15)    Patient seen and examined at bedside.     ALLERGIES:  No Known Allergies    MEDICATIONS  (STANDING):  amLODIPine   Tablet 10 milliGRAM(s) Oral daily  carvedilol 6.25 milliGRAM(s) Oral every 12 hours  chlorhexidine 4% Liquid 1 Application(s) Topical <User Schedule>  dextrose 40% Gel 15 Gram(s) Oral once  dextrose 5%. 1000 milliLiter(s) (50 mL/Hr) IV Continuous <Continuous>  dextrose 5%. 1000 milliLiter(s) (100 mL/Hr) IV Continuous <Continuous>  dextrose 50% Injectable 25 Gram(s) IV Push once  dextrose 50% Injectable 12.5 Gram(s) IV Push once  dextrose 50% Injectable 25 Gram(s) IV Push once  famotidine Injectable 20 milliGRAM(s) IV Push every 12 hours  glucagon  Injectable 1 milliGRAM(s) IntraMuscular once  heparin   Injectable 5000 Unit(s) SubCutaneous every 8 hours  insulin lispro (ADMELOG) corrective regimen sliding scale   SubCutaneous Before meals and at bedtime  insulin lispro Injectable (ADMELOG) 10 Unit(s) SubCutaneous three times a day with meals  insulin regular Infusion 11 Unit(s)/Hr (11 mL/Hr) IV Continuous <Continuous>  ondansetron Injectable 4 milliGRAM(s) IV Push once  sodium chloride 0.45%. 1000 milliLiter(s) (75 mL/Hr) IV Continuous <Continuous>    MEDICATIONS  (PRN):    Vital Signs Last 24 Hrs  T(F): 97.6 (2022 15:00), Max: 98.2 (2022 04:01)  HR: 101 (2022 16:00) (98 - 120)  BP: 171/84 (2022 16:00) (102/68 - 175/89)  RR: 18 (2022 16:00) (17 - 26)  SpO2: 96% (2022 13:00) (92% - 98%)  I&O's Summary    2022 07:01  -  2022 07:00  --------------------------------------------------------  IN: 1060 mL / OUT: 600 mL / NET: 460 mL    2022 07:01  -  2022 17:07  --------------------------------------------------------  IN: 970 mL / OUT: 0 mL / NET: 970 mL      PHYSICAL EXAM:  General: NAD, A/O x 3  ENT: MMM, no thrush  Neck: Supple, No JVD  Lungs: Clear to auscultation bilaterally, good air entry, non-labored breathing  Cardio: +s1/s2  Abdomen: Soft, Nontender, Nondistended; Bowel sounds present  Extremities: No calf tenderness, No pitting edema    LABS:                        15.5   7.45  )-----------( 368      ( 2022 23:35 )             47.9         152  |  110  |  33.8  ----------------------------<  414  4.2   |  29.0  |  1.31    Ca    10.1      2022 10:37  Phos  2.3       Mg     3.4         TPro  8.9  /  Alb  4.3  /  TBili  0.3  /  DBili  x   /  AST  15  /  ALT  22  /  AlkPhos  157      Lipase, Serum: 92 U/L (22 @ 23:35)    eGFR if Non African American: 44 mL/min/1.73M2 (22 @ 10:37)  eGFR if African American: 50 mL/min/1.73M2 (22 @ 10:37)    TSH 0.86   TSH with FT4 reflex --  Total T3 --    02:46 - VBG - pH:       | pCO2:       | pO2:       | Lactate: 4.30   23:34 - VBG - pH: 7.350 | pCO2: 41    | pO2: 116   | Lactate: 4.30     Glucose  POCT Blood Glucose.: 203 mg/dL (2022 15:14)  POCT Blood Glucose.: 253 mg/dL (2022 14:15)  POCT Blood Glucose.: 297 mg/dL (2022 12:50)  POCT Blood Glucose.: 332 mg/dL (2022 11:13)  POCT Blood Glucose.: 409 mg/dL (2022 09:17)  POCT Blood Glucose.: 518 mg/dL (2022 07:21)  POCT Blood Glucose.: >530 mg/dL (2022 06:09)  POCT Blood Glucose.: >530 mg/dL (2022 05:12)  POCT Blood Glucose.: >530 mg/dL (2022 04:13)  POCT Blood Glucose.: >530 mg/dL (2022 03:21)  POCT Blood Glucose.: >530 mg/dL (2022 02:08)  POCT Blood Glucose.: >530 mg/dL (2022 22:57)  POCT Blood Glucose.: >530 mg/dL (2022 22:54)    Urinalysis Basic - ( 2022 02:01 )  Color: Yellow / Appearance: Clear / S.010 / pH: x  Gluc: x / Ketone: Moderate  / Bili: Negative / Urobili: Negative mg/dL   Blood: x / Protein: Negative / Nitrite: Negative   Leuk Esterase: Negative / RBC: x / WBC x   Sq Epi: x / Non Sq Epi: x / Bacteria: x    RADIOLOGY & ADDITIONAL TESTS:  < from: CT Chest/Abdomen No Cont (22 @ 03:09) >  IMPRESSION:  No acute finding in the chest, abdomen or pelvis to explain patient's   symptomatology.  Subcutaneous edema and subfascial fluid with foci of subcutaneous air in   the visualized proximal right upper extremity. Please correlate   clinically for recent intervention versus cellulitis.  < end of copied text >    < from: CT Head/Cspine No Cont (22 @ 03:08) >  IMPRESSION:  1. Normal imaging of the brain for the patient's age.  2. No cervical spine fracture or traumatic malalignment.  < end of copied text >    Care Discussed with Consultants/Other Providers:   Endocrinology  [facility]

## 2022-02-18 NOTE — ED PROVIDER NOTE - PROGRESS NOTE DETAILS
immediately after eval of pt, ekg and BG was performed, pt found to have BG higher than 530 unable to be read. pt moved to main hospital, placed on monitor, labs and lined. later found to have BG of >1200, potassium wnl, insulin ordered, pt moved to critical, ICU consulted. pt remained stable throughout - rhys walsh

## 2022-02-18 NOTE — PATIENT PROFILE ADULT - FALL HARM RISK - HARM RISK INTERVENTIONS

## 2022-02-18 NOTE — H&P ADULT - ATTENDING COMMENTS
61-year-old -American female with past medical history of essential hypertension, asthma, sleep apnea with distant thyroidectomy presented with nausea vomiting abdominal pain constipation polydipsia polyuria was diagnosed newly diabetes mellitus type 2 with hemoglobin A1c Of 14.7 , Admitted with diabetic ketoacidosis with anion gap of 30, severe hyponatremia with corrected level of 168 And acute metabolic encephalopathy.  After receiving 4 L bolus of lactated Ringer's solution intravenously, patient is being treated with half-normal saline infusion at 200 cc/h with insulin infusion currently at 15 units/h with every 4 hour BMP mag Phosphorus for anion gap assessment along with every hourly blood sugar fingerstick assessment to be continued for now.  Eventually to be assessed by endocrinology with diabetic education team to follow.  We will continue with medical ICU management for now

## 2022-02-18 NOTE — CONSULT NOTE ADULT - SUBJECTIVE AND OBJECTIVE BOX
Patient is a 61y old  Female who presents with a chief complaint of DKA (2022 17:05)      HPI:  60 y/o F with a h/o HTN, asthma, VINICIUS, remote thyroidectomy, presents to the ED with a few days of progressive AMS, n/v, abdominal pain, constipation, polydipsia, and polyuria. She reports that she has been drinking a ton of orange juice, tea, and water recently but is unable to quench her thirst. BG noted to be elevated to 1302. AG of 30. She is very lethargic and her mentation is far from her baseline, as per her  at bedside.  Aggressively hydrated and started on an insulin infusion. (2022 01:03)    Above noted   + New onset DM   Interventions noted   Feels better   IVF noted       PAST MEDICAL & SURGICAL HISTORY:  Asthma    Hypertension    Sleep apnea    S/P hysterectomy    S/P cholecystectomy    S/P thyroidectomy        FAMILY HISTORY:  No pertinent family history in first degree relatives        Social History:    MEDICATIONS  (STANDING):  amLODIPine   Tablet 10 milliGRAM(s) Oral daily  carvedilol 6.25 milliGRAM(s) Oral every 12 hours  chlorhexidine 4% Liquid 1 Application(s) Topical <User Schedule>  dextrose 40% Gel 15 Gram(s) Oral once  dextrose 5%. 1000 milliLiter(s) (50 mL/Hr) IV Continuous <Continuous>  dextrose 5%. 1000 milliLiter(s) (100 mL/Hr) IV Continuous <Continuous>  dextrose 50% Injectable 25 Gram(s) IV Push once  dextrose 50% Injectable 12.5 Gram(s) IV Push once  dextrose 50% Injectable 25 Gram(s) IV Push once  famotidine Injectable 20 milliGRAM(s) IV Push every 12 hours  glucagon  Injectable 1 milliGRAM(s) IntraMuscular once  heparin   Injectable 5000 Unit(s) SubCutaneous every 8 hours  insulin lispro (ADMELOG) corrective regimen sliding scale   SubCutaneous Before meals and at bedtime  insulin lispro Injectable (ADMELOG) 10 Unit(s) SubCutaneous three times a day with meals  insulin regular Infusion 11 Unit(s)/Hr (11 mL/Hr) IV Continuous <Continuous>  sodium chloride 0.45%. 1000 milliLiter(s) (75 mL/Hr) IV Continuous <Continuous>    MEDICATIONS  (PRN):      Allergies    No Known Allergies    Intolerances          Vital Signs Last 24 Hrs  T(C): 36.4 (2022 15:00), Max: 36.8 (2022 04:01)  T(F): 97.6 (2022 15:00), Max: 98.2 (2022 04:01)  HR: 101 (2022 16:00) (98 - 120)  BP: 171/84 (2022 16:00) (102/68 - 175/89)  BP(mean): 104 (2022 16:00) (102 - 126)  RR: 18 (2022 16:00) (17 - 26)  SpO2: 96% (2022 13:00) (92% - 98%)  Daily Height in cm: 170.18 (2022 03:33)    Daily Weight in k.7 (2022 03:33)  I&O's Detail    2022 07:01  -  2022 07:00  --------------------------------------------------------  IN:    Insulin: 60 mL    sodium chloride 0.45% w/ Additives: 1000 mL  Total IN: 1060 mL    OUT:    Voided (mL): 600 mL  Total OUT: 600 mL    Total NET: 460 mL      2022 07:01  -  2022 17:25  --------------------------------------------------------  IN:    Insulin: 70 mL    sodium chloride 0.45% w/ Additives: 900 mL  Total IN: 970 mL    OUT:  Total OUT: 0 mL    Total NET: 970 mL        I&O's Summary    2022 07:01  -  2022 07:00  --------------------------------------------------------  IN: 1060 mL / OUT: 600 mL / NET: 460 mL    2022 07:01  -  2022 17:25  --------------------------------------------------------  IN: 970 mL / OUT: 0 mL / NET: 970 mL        PHYSICAL EXAM:    GENERAL: NAD  HEAD:  Atraumatic, Normocephalic  EYES: EOMI, PERRLA, conjunctiva and sclera clear  ENMT: No tonsillar erythema, exudates, or enlargement; Moist mucous membranes, Good dentition, No lesions  NECK: Supple, No JVD, Normal thyroid  NERVOUS SYSTEM:  Alert & Oriented X3, Good concentration; Motor Strength 5/5 B/L upper and lower extremities; DTRs 2+ intact and symmetric  CHEST/LUNG: Clear to percussion bilaterally; No rales, rhonchi, wheezing, or rubs  HEART: Regular rate and rhythm; No murmurs, rubs, or gallops  ABDOMEN: Soft, Nontender, Nondistended; Bowel sounds present  EXTREMITIES:  2+ Peripheral Pulses, No clubbing, cyanosis, or edema    LABS:                        15.5   7.45  )-----------( 368      ( 2022 23:35 )             47.9     02-18    152<H>  |  110<H>  |  33.8<H>  ----------------------------<  414<H>  4.2   |  29.0  |  1.31<H>    Ca    10.1      2022 10:37  Phos  2.3     02-18  Mg     3.4     -18    TPro  8.9<H>  /  Alb  4.3  /  TBili  0.3<L>  /  DBili  x   /  AST  15  /  ALT  22  /  AlkPhos  157<H>  02-17      Urinalysis Basic - ( 2022 02:01 )    Color: Yellow / Appearance: Clear / S.010 / pH: x  Gluc: x / Ketone: Moderate  / Bili: Negative / Urobili: Negative mg/dL   Blood: x / Protein: Negative / Nitrite: Negative   Leuk Esterase: Negative / RBC: x / WBC x   Sq Epi: x / Non Sq Epi: x / Bacteria: x      Magnesium, Serum: 3.4 mg/dL ( @ 09:26)  Phosphorus Level, Serum: 2.3 mg/dL ( @ 09:26)  Magnesium, Serum: 3.8 mg/dL ( @ 03:07)  Phosphorus Level, Serum: 6.6 mg/dL ( @ 03:07)  Magnesium, Serum: 3.7 mg/dL ( @ 01:47)  Phosphorus Level, Serum: 6.5 mg/dL ( @ 01:47)  Magnesium, Serum: 3.7 mg/dL ( @ 23:35)          < from: CT Abdomen and Pelvis No Cont (22 @ 03:09) >    ACC: [id number] EXAM:  CT ABDOMEN AND PELVIS                        ACC: [id number] EXAM:  CT CHEST                          PROCEDURE DATE:  2022          INTERPRETATION:  CLINICAL INFORMATION: patient brought in by daughter   states that she "is not feeling well" complaining of abdominal pain N/V   not eating    COMPARISON: Chest CT 2016    CONTRAST/COMPLICATIONS:  IV Contrast: NONE  Oral Contrast: NONE  Complications: None reported at time of study completion    PROCEDURE:  CT of the Chest, abdomen and pelvis were performed.  Sagittal and coronal reformats were performed.    FINDINGS:    CHEST:  Suboptimal study due to patient positioning with arms at the side which   causes streak artifact. Limited evaluation of the vascular structures and   soft tissues without IV contrast.    LUNGS AND AIRWAYS: Patent central airways.  Lungs are clear.  PLEURA: No pleural effusion. No pneumothorax.  MEDIASTINUM AND PRESTON: No lymphadenopathy.  VESSELS: Within normal limits.  HEART: Heart size is normal. No pericardial effusion.  CHEST WALL AND LOWER NECK: Visualized proximal right upper extremity   demonstrates nonspecific subcutaneous edema with fat stranding and   subfascial fluid with foci of air.  VISUALIZED UPPER ABDOMEN: Within normal limits.  BONES: Within normal limits.    ABDOMEN/PELVIS:  LIVER: Within normal limits.  BILE DUCTS: Normal caliber.  GALLBLADDER: Cholecystectomy.  SPLEEN: Within normal limits.  PANCREAS: Within normal limits.  ADRENALS: Within normal limits.  KIDNEYS/URETERS: No hydronephrosis or perinephric stranding.    BLADDER: Within normal limits.  REPRODUCTIVE ORGANS: Hysterectomy.    BOWEL: No bowel obstruction. Appendix is normal. Descending and sigmoid   colon diverticulosis. No evidence of diverticulitis. Small hiatal hernia.  PERITONEUM: No ascites.  VESSELS: Within normal limits.  RETROPERITONEUM/LYMPH NODES: No lymphadenopathy.  ABDOMINAL WALL: Within normal limits.  BONES: Within normal limits.    IMPRESSION:  No acute finding in the chest, abdomen or pelvis to explain patient's   symptomatology.    Subcutaneous edema and subfascial fluid with foci of subcutaneous air in   the visualized proximal right upper extremity. Please correlate   clinically for recent intervention versus cellulitis.    --- End of Report ---            NAMAN BROUSSARD MD; Attending Radiologist  This document has been electronically signed. 2022  6:00AM    < end of copied text >  < from: CT Head No Cont (22 @ 03:08) >    ACC: [id number] EXAM:  CT CERVICAL SPINE                        ACC: [id number] EXAM:  CT BRAIN                          PROCEDURE DATE:  2022          INTERPRETATION:  EXAMINATION: CT HEAD, CT CERVICAL SPINE    DATE: 2022 3:08 AM    INDICATION: Altered mental status and lethargy    COMPARISON: Remote CT head from April 15, 2018    TECHNIQUE: Thin section noncontrast axial images were obtained through   the head and cervical spine, multiplanar reformats submitted. Additional   3-D reformats of the cervical spine created on an outside workstation and   submitted for review.    FINDINGS:  CT head: The density of the brain is appropriate for age. Gray-white   differentiation is preserved. The ventricles and sulci are normal in size   and configuration. The basilar cisterns are clear. No focal edema or   acute mass effect. There is no intracranial fluid collection or acute   hemorrhage.    There is no fracture identified. The visualized paranasal sinuses have   minimal mucosal thickening. Air-fluid level in the left sphenoid sinus.   Mastoid air cells are clear. Scalp and imaged facial soft tissues are   within normal limits.    CT cervical spine: Spinal alignment is anatomic. Craniocervical junction   is normal. No acute fracture. Vertebral body heights are maintained.   Normal bone mineralization. No significant degenerative disease.    No gross upper cervical canal hematoma or mass. Cervical soft tissues are   unremarkable.      IMPRESSION:  1. Normal imaging of the brain for the patient's age.  2. No cervical spine fracture or traumatic malalignment.    --- End of Report ---        < end of copied text >  RADIOLOGY & ADDITIONAL TESTS:

## 2022-02-18 NOTE — ED PROVIDER NOTE - CRITICAL CARE ATTENDING CONTRIBUTION TO CARE
I have personally seen and examined this patient. I have fully participated in the care of this patient. I have made amendments to the documentation where appropriate and otherwise agree with the history, physical exam, and plan as documented by the ACP.    Acute AMS in the setting of several days of polyuria and polydipsia, found to be in HHS with FS >1300 and hypernatremia. Insulin infusion started, will be admitted to MICU for further management.

## 2022-02-18 NOTE — CONSULT NOTE ADULT - SUBJECTIVE AND OBJECTIVE BOX
HPI:  62 y/o F with a h/o HTN, asthma, VINICIUS, remote thyroidectomy, presents to the ED with a few days of progressive AMS, n/v, abdominal pain, constipation, polydipsia, and polyuria. She reports that she has been drinking a ton of orange juice, tea, and water recently but is unable to quench her thirst. BG noted to be elevated to 1302. AG of 30. She is very lethargic and her mentation is far from her baseline, as per her  at bedside.  Aggressively hydrated and started on an insulin infusion. (18 Feb 2022 01:03)    Endocrine consulted : new onset DM.  Daughter at bedside. She denies prior history of DM. (+) family history of diabetes  Also with history of partial thyroidectomy for thyroid nodule, no thyroid cancer per pt.  She stated that she did not need thyroid hormone and does not take any thyroid meds at home.    ROS (+) weight loss, denies blurry vision. denies CP/palp. denies cough/SOB. (+) abd pain (+) nausea yesterday, not current.  (+) polyuria, (+) polydipsia (+) dry mouth.  denies fever/chills. denies headache, (+) fatigue    PAST MEDICAL & SURGICAL HISTORY:  Asthma  Hypertension  Sleep apnea  S/P hysterectomy  S/P cholecystectomy  S/P thyroidectomy    FAMILY HISTORY:  Mother - DM    SOCIAL HISTORY: denies tobacco, illicit drugs or EtOh use    MEDICATIONS  (STANDING):  chlorhexidine 4% Liquid 1 Application(s) Topical <User Schedule>  dextrose 40% Gel 15 Gram(s) Oral once  dextrose 5%. 1000 milliLiter(s) (50 mL/Hr) IV Continuous <Continuous>  dextrose 5%. 1000 milliLiter(s) (100 mL/Hr) IV Continuous <Continuous>  dextrose 50% Injectable 25 Gram(s) IV Push once  dextrose 50% Injectable 12.5 Gram(s) IV Push once  dextrose 50% Injectable 25 Gram(s) IV Push once  famotidine Injectable 20 milliGRAM(s) IV Push every 12 hours  glucagon  Injectable 1 milliGRAM(s) IntraMuscular once  heparin   Injectable 5000 Unit(s) SubCutaneous every 8 hours  insulin glargine Injectable (LANTUS) 50 Unit(s) SubCutaneous once  insulin lispro (ADMELOG) corrective regimen sliding scale   SubCutaneous Before meals and at bedtime  insulin lispro Injectable (ADMELOG) 10 Unit(s) SubCutaneous three times a day with meals  insulin regular Infusion 11 Unit(s)/Hr (11 mL/Hr) IV Continuous <Continuous>  ondansetron Injectable 4 milliGRAM(s) IV Push once  sodium chloride 0.45% with potassium chloride 20 mEq/L 1000 milliLiter(s) (200 mL/Hr) IV Continuous <Continuous>    ALLERGIES: No Known Allergies    Vital Signs Last 24 Hrs  T(C): 36.5 (18 Feb 2022 11:33), Max: 36.8 (18 Feb 2022 04:01)  T(F): 97.7 (18 Feb 2022 11:33), Max: 98.2 (18 Feb 2022 04:01)  HR: 99 (18 Feb 2022 15:00) (98 - 120)  BP: 161/91 (18 Feb 2022 14:00) (102/68 - 175/89)  BP(mean): 106 (18 Feb 2022 14:00) (102 - 126)  RR: 17 (18 Feb 2022 15:00) (17 - 26)  SpO2: 96% (18 Feb 2022 13:00) (92% - 98%)    Physical Exam:  General appearance: Obese, NAD  Eyes: EOMI  Neck: Trachea midline. (+) surgical scar  Lungs: Normal respiratory excursion. Lungs clear no w/r/r  CV: Normal S1S2, regular. No m/r/g.  Pedal pulses intact.  Abdomen: Soft, nontender, nondistended, (+) BS  Musculoskeletal: No cyanosis, clubbing, or edema.  Skin: Warm and moist. Feet - no ulcers  Neuro: Cranial nerves intact. Good sensation to light touch.    Psych: Normal affect, good judgement/insight, tearful      LABS:                        15.5   7.45  )-----------( 368      ( 17 Feb 2022 23:35 )             47.9     02-18    152<H>  |  110<H>  |  33.8<H>  ----------------------------<  414<H>  4.2   |  29.0  |  1.31<H>    Ca    10.1      18 Feb 2022 10:37  Phos  2.3     02-18  Mg     3.4     02-18    TPro  8.9<H>  /  Alb  4.3  /  TBili  0.3<L>  /  DBili  x   /  AST  15  /  ALT  22  /  AlkPhos  157<H>  02-17    LIVER FUNCTIONS - ( 17 Feb 2022 23:35 )  Alb: 4.3 g/dL / Pro: 8.9 g/dL / ALK PHOS: 157 U/L / ALT: 22 U/L / AST: 15 U/L / GGT: x           A1C with Estimated Average Glucose Result: 14.7 % (02-17-22 @ 23:35)    CAPILLARY BLOOD GLUCOSE  POCT Blood Glucose.: 203 mg/dL (18 Feb 2022 15:14)  POCT Blood Glucose.: 253 mg/dL (18 Feb 2022 14:15)  POCT Blood Glucose.: 297 mg/dL (18 Feb 2022 12:50)  POCT Blood Glucose.: 332 mg/dL (18 Feb 2022 11:13)  POCT Blood Glucose.: 409 mg/dL (18 Feb 2022 09:17)  POCT Blood Glucose.: 518 mg/dL (18 Feb 2022 07:21)  POCT Blood Glucose.: >530 mg/dL (18 Feb 2022 06:09)  POCT Blood Glucose.: >530 mg/dL (18 Feb 2022 05:12)  POCT Blood Glucose.: >530 mg/dL (18 Feb 2022 04:13)  POCT Blood Glucose.: >530 mg/dL (18 Feb 2022 03:21)  POCT Blood Glucose.: >530 mg/dL (18 Feb 2022 02:08)  POCT Blood Glucose.: >530 mg/dL (17 Feb 2022 22:57)  POCT Blood Glucose.: >530 mg/dL (17 Feb 2022 22:54)    Thyroid Stimulating Hormone, Serum: 0.86 uIU/mL [0.27 - 4.20] (02-17-22)

## 2022-02-18 NOTE — PROGRESS NOTE ADULT - ASSESSMENT
61 year old female with pmh of htn, asthma, latoya, remote thyroidectomy, coming to hospital with complaints of dehydration and hypernatremia. found to have new diagnosis of diabetes type 2 w/ dka and admitted to micu for further care. as improved now being downgraded to medicine for further care.     #Type 2 Diabetes- new onset uncontrolled w/ hyperglycemia  - sliding scale, lantus, premeal  - monitor fingersticks  - endo consult appreciated  - a1c 14.7  - w/ dehydration     #hypernatremia  - ivf  - probable 2/2 dehydration    #HTN- Essential  - moniotor blood pressure  - coreg and amlodipine    #gerd  - famotidine    #darrell  - [facility]   - nephro consult for am   - monitor cr   - avoid nephrotoxic meds     #s/p partial thyroidectomy  - normal tsh     #DVT Prophylaxis  - heparin SC    spoke to patient and  bedside. all questions answered.

## 2022-02-18 NOTE — ED PROVIDER NOTE - CLINICAL SUMMARY MEDICAL DECISION MAKING FREE TEXT BOX
61 year old female with PMH of HTN, asthma, VINICIUS presents to ED c/o abdominal pain. labs, CT, BG, ekg

## 2022-02-18 NOTE — ED ADULT NURSE NOTE - OBJECTIVE STATEMENT
pt presents to ED awake, alert but drowsy due to abdominal pain, N/V with decreased PO intake x 3 days. pt reports drinking lots of fluids these past few days but unable to tolerate food. pt denies hx of DM. Assumed pt care in critical care area as pt found to have glucose of 1302. MICU team at bedside for evaluation. insulin gtt to be started, see EMAR. Family at bedside involved in plan of care. CM in place ST . IV fluids infusing. awaiting in patient bed as pt accepted to MICU.

## 2022-02-18 NOTE — H&P ADULT - ASSESSMENT
62 y/o F with a h/o HTN, asthma, VINICIUS, remote thyroidectomy, with severe DKA, ANTONINA, massive dehydration, hypernatremia.    Admit to MICU for further management.    New DM2 diagnosis. Hgba1c 14.7.    - s/p 2L LR bolus, will bolus additional 2L and start 0.45% saline with potassium supplement @ 200cc/hr  - start insulin infusion and titrate in accordance with Q 1 hour accu-checks  - when BG has dropped into 200-250 range will add dextrose to IVF for temporary buffer purposes  - trend serum chemistry/VBG to follow AG, acid-base balance, and electrolyte levels  - when AG has closed will bridge off insulin gtt with Lantus and transition to sliding scale regimen  - keep NPO for now  - will consult diabetes specialist and endocrinology  - ANTONINA is pre-renal in nature secondary to massive dehydration from osmotic diuresis, aggressively hydrating  - trend BUN/Cr, monitor lytes, acid-base balance, and UOP  - no indication for urgent HD at this time  - corrected serum Na is 159  - lactic acidosis likely related to hypovolemia, will trend, no evidence of active infection  - CT H/C/A/P pending    Case discussed in detail with MICU physician, Dr. Broderick.      CRITICAL CARE TIME SPENT: 47 mins  Time spent evaluating/treating patient with medical issues that pose a high risk for life threatening deterioration and/or end-organ damage, reviewing data/labs/imaging, discussing case with multidisciplinary team, discussing plan/goals of care with patient/family. Non-inclusive of procedure time.

## 2022-02-18 NOTE — CHART NOTE - NSCHARTNOTEFT_GEN_A_CORE
At this time, pt is stable for transfer to medical floor.    Case signed out to hospitalist, Dr. Broderick.

## 2022-02-19 LAB
ALBUMIN SERPL ELPH-MCNC: 3.2 G/DL — LOW (ref 3.3–5.2)
ALP SERPL-CCNC: 121 U/L — HIGH (ref 40–120)
ALT FLD-CCNC: 20 U/L — SIGNIFICANT CHANGE UP
ANION GAP SERPL CALC-SCNC: 16 MMOL/L — SIGNIFICANT CHANGE UP (ref 5–17)
AST SERPL-CCNC: 39 U/L — HIGH
BILIRUB SERPL-MCNC: 0.4 MG/DL — SIGNIFICANT CHANGE UP (ref 0.4–2)
BUN SERPL-MCNC: 35.8 MG/DL — HIGH (ref 8–20)
CA-I BLD-SCNC: 1.14 MMOL/L — LOW (ref 1.15–1.33)
CALCIUM SERPL-MCNC: 8.9 MG/DL — SIGNIFICANT CHANGE UP (ref 8.6–10.2)
CHLORIDE SERPL-SCNC: 108 MMOL/L — HIGH (ref 98–107)
CO2 SERPL-SCNC: 24 MMOL/L — SIGNIFICANT CHANGE UP (ref 22–29)
CREAT SERPL-MCNC: 1.25 MG/DL — SIGNIFICANT CHANGE UP (ref 0.5–1.3)
CULTURE RESULTS: SIGNIFICANT CHANGE UP
GLUCOSE BLDC GLUCOMTR-MCNC: 210 MG/DL — HIGH (ref 70–99)
GLUCOSE BLDC GLUCOMTR-MCNC: 302 MG/DL — HIGH (ref 70–99)
GLUCOSE BLDC GLUCOMTR-MCNC: 334 MG/DL — HIGH (ref 70–99)
GLUCOSE BLDC GLUCOMTR-MCNC: 467 MG/DL — CRITICAL HIGH (ref 70–99)
GLUCOSE SERPL-MCNC: 464 MG/DL — CRITICAL HIGH (ref 70–99)
HCT VFR BLD CALC: 43 % — SIGNIFICANT CHANGE UP (ref 34.5–45)
HGB BLD-MCNC: 13.3 G/DL — SIGNIFICANT CHANGE UP (ref 11.5–15.5)
LACTATE SERPL-SCNC: 1.3 MMOL/L — SIGNIFICANT CHANGE UP (ref 0.5–2)
MAGNESIUM SERPL-MCNC: 2.8 MG/DL — HIGH (ref 1.8–2.6)
MCHC RBC-ENTMCNC: 28.9 PG — SIGNIFICANT CHANGE UP (ref 27–34)
MCHC RBC-ENTMCNC: 30.9 GM/DL — LOW (ref 32–36)
MCV RBC AUTO: 93.5 FL — SIGNIFICANT CHANGE UP (ref 80–100)
PHOSPHATE SERPL-MCNC: 2.9 MG/DL — SIGNIFICANT CHANGE UP (ref 2.4–4.7)
PLATELET # BLD AUTO: 220 K/UL — SIGNIFICANT CHANGE UP (ref 150–400)
POTASSIUM SERPL-MCNC: 4.2 MMOL/L — SIGNIFICANT CHANGE UP (ref 3.5–5.3)
POTASSIUM SERPL-SCNC: 4.2 MMOL/L — SIGNIFICANT CHANGE UP (ref 3.5–5.3)
PROT SERPL-MCNC: 7.1 G/DL — SIGNIFICANT CHANGE UP (ref 6.6–8.7)
RBC # BLD: 4.6 M/UL — SIGNIFICANT CHANGE UP (ref 3.8–5.2)
RBC # FLD: 13.3 % — SIGNIFICANT CHANGE UP (ref 10.3–14.5)
SODIUM SERPL-SCNC: 148 MMOL/L — HIGH (ref 135–145)
SPECIMEN SOURCE: SIGNIFICANT CHANGE UP
WBC # BLD: 9 K/UL — SIGNIFICANT CHANGE UP (ref 3.8–10.5)
WBC # FLD AUTO: 9 K/UL — SIGNIFICANT CHANGE UP (ref 3.8–10.5)

## 2022-02-19 PROCEDURE — 99233 SBSQ HOSP IP/OBS HIGH 50: CPT

## 2022-02-19 PROCEDURE — 99232 SBSQ HOSP IP/OBS MODERATE 35: CPT

## 2022-02-19 RX ORDER — INSULIN GLARGINE 100 [IU]/ML
60 INJECTION, SOLUTION SUBCUTANEOUS AT BEDTIME
Refills: 0 | Status: DISCONTINUED | OUTPATIENT
Start: 2022-02-19 | End: 2022-02-21

## 2022-02-19 RX ORDER — INSULIN LISPRO 100/ML
16 VIAL (ML) SUBCUTANEOUS
Refills: 0 | Status: DISCONTINUED | OUTPATIENT
Start: 2022-02-19 | End: 2022-02-23

## 2022-02-19 RX ADMIN — FAMOTIDINE 20 MILLIGRAM(S): 10 INJECTION INTRAVENOUS at 16:41

## 2022-02-19 RX ADMIN — Medication 16 UNIT(S): at 16:42

## 2022-02-19 RX ADMIN — Medication 10 UNIT(S): at 11:41

## 2022-02-19 RX ADMIN — AMLODIPINE BESYLATE 10 MILLIGRAM(S): 2.5 TABLET ORAL at 05:37

## 2022-02-19 RX ADMIN — Medication 10 UNIT(S): at 08:23

## 2022-02-19 RX ADMIN — Medication 12: at 08:20

## 2022-02-19 RX ADMIN — Medication 4: at 16:41

## 2022-02-19 RX ADMIN — HEPARIN SODIUM 5000 UNIT(S): 5000 INJECTION INTRAVENOUS; SUBCUTANEOUS at 22:21

## 2022-02-19 RX ADMIN — INSULIN GLARGINE 50 UNIT(S): 100 INJECTION, SOLUTION SUBCUTANEOUS at 08:22

## 2022-02-19 RX ADMIN — INSULIN GLARGINE 60 UNIT(S): 100 INJECTION, SOLUTION SUBCUTANEOUS at 22:33

## 2022-02-19 RX ADMIN — Medication 8: at 22:20

## 2022-02-19 RX ADMIN — Medication 8: at 11:40

## 2022-02-19 RX ADMIN — FAMOTIDINE 20 MILLIGRAM(S): 10 INJECTION INTRAVENOUS at 05:37

## 2022-02-19 RX ADMIN — HEPARIN SODIUM 5000 UNIT(S): 5000 INJECTION INTRAVENOUS; SUBCUTANEOUS at 05:37

## 2022-02-19 RX ADMIN — CARVEDILOL PHOSPHATE 6.25 MILLIGRAM(S): 80 CAPSULE, EXTENDED RELEASE ORAL at 22:21

## 2022-02-19 RX ADMIN — HEPARIN SODIUM 5000 UNIT(S): 5000 INJECTION INTRAVENOUS; SUBCUTANEOUS at 14:30

## 2022-02-19 RX ADMIN — CARVEDILOL PHOSPHATE 6.25 MILLIGRAM(S): 80 CAPSULE, EXTENDED RELEASE ORAL at 08:24

## 2022-02-19 NOTE — PROGRESS NOTE ADULT - ASSESSMENT
61 year old female with PMH of HTN, asthma, VINICIUS, remote thyroidectomy, coming to hospital with complaints of dehydration and hypernatremia, found to have new diagnosis of diabetes type 2 w/ dka and admitted to MICU. Now improved and downgraded to medicine for further care.     Type 2 Diabetes, new onset uncontrolled w/ hyperglycemia  NG high, in 400s this AM   Increase Lantus and Admelog dosing   c/w sliding scale   Monitor fingersticks   Appreciate Endo consult appreciated   A1c 14.7     Hypernatremia  probable 2/2 dehydration   encourage PO hydration     HTN, Essential  c/w Coreg and Amlodipine   Monitor blood pressure     GERD   c/w famotidine    ANTONINA  US renal reviewed with no acute pathology   Appreciate Nephro consult   Monitor cr   Avoid nephrotoxic meds     s/p partial thyroidectomy  Normal tsh     DVT Prophylaxis: Heparin SC    Discussed with  and daughter at bedside

## 2022-02-19 NOTE — PROGRESS NOTE ADULT - ASSESSMENT
62 y/o F with a h/o HTN, asthma, VINICIUS, remote thyroidectomy admit with severe DKA, newly Dx T2DM, hypernatremia and ANTONINA  1. new onset T2DM (A1c 14.7%) admit with DKA - DKA resolved, s/p insulin drip and started on basal/bolus insulin. She remains hyperglycemic likely insulin resistance due to obesity and glucose toxicity   - continue Lantus 50 units in am, increase Lantus to 60 units at bedtime  - increase prandial Admelog to 16 units, cont Ademelog scale  - transition to basal/bolus insulin per MICU protocol  - will monitor sugars and adjust insulin as needed  - insulin teaching and diabetic education (glucometer teaching and nutritional education) while in hospital, daughter would like to learn how to inject insulin     2. s/p partial thyroidectomy -normal TSH, no thyroid hormone needed    3. hypernatremia - due to dehydration  - on IVF 1/2 NS  - increase PO water intake  - monitor labs    4. ANTONINA due to dehydration- improving, cont IV hydration, monitor labs, renal following

## 2022-02-19 NOTE — PROGRESS NOTE ADULT - SUBJECTIVE AND OBJECTIVE BOX
Follow up: diabetes  Interval Hx/Events: tired today but overall feels better    MEDICATIONS  (STANDING):  amLODIPine   Tablet 10 milliGRAM(s) Oral daily  carvedilol 6.25 milliGRAM(s) Oral every 12 hours  chlorhexidine 4% Liquid 1 Application(s) Topical <User Schedule>  dextrose 40% Gel 15 Gram(s) Oral once  dextrose 5%. 1000 milliLiter(s) (50 mL/Hr) IV Continuous <Continuous>  dextrose 5%. 1000 milliLiter(s) (100 mL/Hr) IV Continuous <Continuous>  dextrose 50% Injectable 25 Gram(s) IV Push once  dextrose 50% Injectable 12.5 Gram(s) IV Push once  dextrose 50% Injectable 25 Gram(s) IV Push once  famotidine Injectable 20 milliGRAM(s) IV Push every 12 hours  glucagon  Injectable 1 milliGRAM(s) IntraMuscular once  heparin   Injectable 5000 Unit(s) SubCutaneous every 8 hours  insulin glargine Injectable (LANTUS) 50 Unit(s) SubCutaneous every morning  insulin glargine Injectable (LANTUS) 50 Unit(s) SubCutaneous at bedtime  insulin lispro (ADMELOG) corrective regimen sliding scale   SubCutaneous Before meals and at bedtime  insulin lispro Injectable (ADMELOG) 10 Unit(s) SubCutaneous three times a day with meals  sodium chloride 0.45%. 1000 milliLiter(s) (75 mL/Hr) IV Continuous <Continuous>    MEDICATIONS  (PRN):      ALLERGIES: No Known Allergies    Vital Signs Last 24 Hrs  T(C): 36.8 (19 Feb 2022 08:22), Max: 36.8 (18 Feb 2022 18:37)  T(F): 98.3 (19 Feb 2022 08:22), Max: 98.3 (18 Feb 2022 18:37)  HR: 93 (19 Feb 2022 08:22) (68 - 116)  BP: 134/85 (19 Feb 2022 08:22) (132/83 - 171/84)  BP(mean): 104 (18 Feb 2022 16:00) (104 - 107)  RR: 18 (19 Feb 2022 08:22) (16 - 18)  SpO2: 100% (19 Feb 2022 08:22) (94% - 100%)    Physical Exam:  General appearance: Obese  Eyes: EOMI  Lungs: Normal respiratory excursion. Lungs clear no w/r/r  CV: Normal S1S2, regular. No m/r/g.  Pedal pulses intact.  Abdomen: Soft, nontender, nondistended, (+) BS  Musculoskeletal: No cyanosis, clubbing, or edema.  Skin: Warm and moist. (+) acanthosis. Feet: no ulcers  Neuro: Cranial nerves intact.   Psych: Normal affect, good judgement/insight      LABS:                        13.3   9.00  )-----------( 220      ( 19 Feb 2022 08:27 )             43.0     02-19    148<H>  |  108<H>  |  35.8<H>  ----------------------------<  464<HH>  4.2   |  24.0  |  1.25    Ca    8.9      19 Feb 2022 08:27  Phos  2.9     02-19  Mg     2.8     02-19    TPro  7.1  /  Alb  3.2<L>  /  TBili  0.4  /  DBili  x   /  AST  39<H>  /  ALT  20  /  AlkPhos  121<H>  02-19    LIVER FUNCTIONS - ( 19 Feb 2022 08:27 )  Alb: 3.2 g/dL / Pro: 7.1 g/dL / ALK PHOS: 121 U/L / ALT: 20 U/L / AST: 39 U/L / GGT: x             A1C with Estimated Average Glucose Result: 14.7 % (02-17-22 @ 23:35)      CAPILLARY BLOOD GLUCOSE  POCT Blood Glucose.: 334 mg/dL (19 Feb 2022 11:12)  POCT Blood Glucose.: 467 mg/dL (19 Feb 2022 08:16)  POCT Blood Glucose.: 313 mg/dL (18 Feb 2022 22:05)  POCT Blood Glucose.: 299 mg/dL (18 Feb 2022 18:47)  POCT Blood Glucose.: 221 mg/dL (18 Feb 2022 17:26)  POCT Blood Glucose.: 203 mg/dL (18 Feb 2022 15:14)  POCT Blood Glucose.: 253 mg/dL (18 Feb 2022 14:15)  POCT Blood Glucose.: 297 mg/dL (18 Feb 2022 12:50)  POCT Blood Glucose.: 332 mg/dL (18 Feb 2022 11:13)  POCT Blood Glucose.: 409 mg/dL (18 Feb 2022 09:17)  POCT Blood Glucose.: 518 mg/dL (18 Feb 2022 07:21)  POCT Blood Glucose.: >530 mg/dL (18 Feb 2022 06:09)  POCT Blood Glucose.: >530 mg/dL (18 Feb 2022 05:12)  POCT Blood Glucose.: >530 mg/dL (18 Feb 2022 04:13)  POCT Blood Glucose.: >530 mg/dL (18 Feb 2022 03:21)  POCT Blood Glucose.: >530 mg/dL (18 Feb 2022 02:08)  POCT Blood Glucose.: >530 mg/dL (17 Feb 2022 22:57)  POCT Blood Glucose.: >530 mg/dL (17 Feb 2022 22:54)      Thyroid Stimulating Hormone, Serum: 0.86 uIU/mL [0.27 - 4.20] (02-17-22)

## 2022-02-19 NOTE — PROGRESS NOTE ADULT - SUBJECTIVE AND OBJECTIVE BOX
[facility] Division of Hospital Medicine    Chief Complaint:  DKA    SUBJECTIVE / OVERNIGHT EVENTS:  Pt reports feeling very tired still   and daughter at bedside     Patient denies chest pain, SOB, abd pain, N/V, fever, chills, dysuria or any other complaints. All remainder ROS negative.     MEDICATIONS  (STANDING):  amLODIPine   Tablet 10 milliGRAM(s) Oral daily  carvedilol 6.25 milliGRAM(s) Oral every 12 hours  chlorhexidine 4% Liquid 1 Application(s) Topical <User Schedule>  dextrose 40% Gel 15 Gram(s) Oral once  dextrose 5%. 1000 milliLiter(s) (50 mL/Hr) IV Continuous <Continuous>  dextrose 5%. 1000 milliLiter(s) (100 mL/Hr) IV Continuous <Continuous>  dextrose 50% Injectable 25 Gram(s) IV Push once  dextrose 50% Injectable 12.5 Gram(s) IV Push once  dextrose 50% Injectable 25 Gram(s) IV Push once  famotidine Injectable 20 milliGRAM(s) IV Push every 12 hours  glucagon  Injectable 1 milliGRAM(s) IntraMuscular once  heparin   Injectable 5000 Unit(s) SubCutaneous every 8 hours  insulin glargine Injectable (LANTUS) 50 Unit(s) SubCutaneous every morning  insulin glargine Injectable (LANTUS) 60 Unit(s) SubCutaneous at bedtime  insulin lispro (ADMELOG) corrective regimen sliding scale   SubCutaneous Before meals and at bedtime  insulin lispro Injectable (ADMELOG) 16 Unit(s) SubCutaneous three times a day with meals  sodium chloride 0.45%. 1000 milliLiter(s) (75 mL/Hr) IV Continuous <Continuous>    MEDICATIONS  (PRN):        I&O's Summary    2022 07:01  -  2022 07:00  --------------------------------------------------------  IN: 1495 mL / OUT: 300 mL / NET: 1195 mL        PHYSICAL EXAM:  Vital Signs Last 24 Hrs  T(C): 36.8 (2022 08:22), Max: 36.8 (2022 18:37)  T(F): 98.3 (2022 08:22), Max: 98.3 (2022 18:37)  HR: 93 (2022 08:22) (68 - 116)  BP: 134/85 (2022 08:22) (132/83 - 171/84)  BP(mean): 104 (2022 16:00) (104 - 107)  RR: 18 (2022 08:22) (16 - 18)  SpO2: 100% (2022 08:22) (94% - 100%)      CONSTITUTIONAL: NAD, sitting up in bed  ENMT: Moist oral mucosa, PERRLA, EOMI   RESPIRATORY: Normal respiratory effort; lungs are clear to auscultation bilaterally  CARDIOVASCULAR: Regular rate and rhythm, normal S1 and S2, No lower extremity edema  ABDOMEN: Obese, Nontender to palpation, normoactive bowel sounds  MUSCULOSKELETAL: No clubbing or cyanosis of digits  PSYCH: A+O to person, place, and time; affect appropriate  NEUROLOGY: No gross sensory or motor deficits   SKIN: No rashes; no palpable lesions      LABS:                        13.3   9.00  )-----------( 220      ( 2022 08:27 )             43.0     02-19    148<H>  |  108<H>  |  35.8<H>  ----------------------------<  464<HH>  4.2   |  24.0  |  1.25    Ca    8.9      2022 08:27  Phos  2.9     02-19  Mg     2.8     -19    TPro  7.1  /  Alb  3.2<L>  /  TBili  0.4  /  DBili  x   /  AST  39<H>  /  ALT  20  /  AlkPhos  121<H>  02-19      CARDIAC MARKERS ( 2022 23:36 )  x     / <0.01 ng/mL / x     / x     / x          Urinalysis Basic - ( 2022 02:01 )    Color: Yellow / Appearance: Clear / S.010 / pH: x  Gluc: x / Ketone: Moderate  / Bili: Negative / Urobili: Negative mg/dL   Blood: x / Protein: Negative / Nitrite: Negative   Leuk Esterase: Negative / RBC: x / WBC x   Sq Epi: x / Non Sq Epi: x / Bacteria: x        Culture - Urine (collected 2022 06:22)  Source: Clean Catch Clean Catch (Midstream)  Final Report (2022 08:45):    <10,000 CFU/mL Normal Urogenital Judi      CAPILLARY BLOOD GLUCOSE      POCT Blood Glucose.: 334 mg/dL (2022 11:12)  POCT Blood Glucose.: 467 mg/dL (2022 08:16)  POCT Blood Glucose.: 313 mg/dL (2022 22:05)  POCT Blood Glucose.: 299 mg/dL (2022 18:47)  POCT Blood Glucose.: 221 mg/dL (2022 17:26)  POCT Blood Glucose.: 203 mg/dL (2022 15:14)

## 2022-02-20 LAB
ANION GAP SERPL CALC-SCNC: 13 MMOL/L — SIGNIFICANT CHANGE UP (ref 5–17)
BASOPHILS # BLD AUTO: 0.03 K/UL — SIGNIFICANT CHANGE UP (ref 0–0.2)
BASOPHILS NFR BLD AUTO: 0.5 % — SIGNIFICANT CHANGE UP (ref 0–2)
BUN SERPL-MCNC: 25.3 MG/DL — HIGH (ref 8–20)
CALCIUM SERPL-MCNC: 8.5 MG/DL — LOW (ref 8.6–10.2)
CHLORIDE SERPL-SCNC: 103 MMOL/L — SIGNIFICANT CHANGE UP (ref 98–107)
CO2 SERPL-SCNC: 25 MMOL/L — SIGNIFICANT CHANGE UP (ref 22–29)
CREAT SERPL-MCNC: 0.84 MG/DL — SIGNIFICANT CHANGE UP (ref 0.5–1.3)
EOSINOPHIL # BLD AUTO: 0.11 K/UL — SIGNIFICANT CHANGE UP (ref 0–0.5)
EOSINOPHIL NFR BLD AUTO: 1.8 % — SIGNIFICANT CHANGE UP (ref 0–6)
GLUCOSE BLDC GLUCOMTR-MCNC: 120 MG/DL — HIGH (ref 70–99)
GLUCOSE BLDC GLUCOMTR-MCNC: 191 MG/DL — HIGH (ref 70–99)
GLUCOSE BLDC GLUCOMTR-MCNC: 292 MG/DL — HIGH (ref 70–99)
GLUCOSE BLDC GLUCOMTR-MCNC: 300 MG/DL — HIGH (ref 70–99)
GLUCOSE SERPL-MCNC: 254 MG/DL — HIGH (ref 70–99)
HCT VFR BLD CALC: 40.9 % — SIGNIFICANT CHANGE UP (ref 34.5–45)
HCV AB S/CO SERPL IA: 0.13 S/CO — SIGNIFICANT CHANGE UP (ref 0–0.99)
HCV AB SERPL-IMP: SIGNIFICANT CHANGE UP
HGB BLD-MCNC: 13.1 G/DL — SIGNIFICANT CHANGE UP (ref 11.5–15.5)
IMM GRANULOCYTES NFR BLD AUTO: 0.2 % — SIGNIFICANT CHANGE UP (ref 0–1.5)
LYMPHOCYTES # BLD AUTO: 2.78 K/UL — SIGNIFICANT CHANGE UP (ref 1–3.3)
LYMPHOCYTES # BLD AUTO: 46.7 % — HIGH (ref 13–44)
MCHC RBC-ENTMCNC: 29.4 PG — SIGNIFICANT CHANGE UP (ref 27–34)
MCHC RBC-ENTMCNC: 32 GM/DL — SIGNIFICANT CHANGE UP (ref 32–36)
MCV RBC AUTO: 91.7 FL — SIGNIFICANT CHANGE UP (ref 80–100)
MONOCYTES # BLD AUTO: 0.36 K/UL — SIGNIFICANT CHANGE UP (ref 0–0.9)
MONOCYTES NFR BLD AUTO: 6.1 % — SIGNIFICANT CHANGE UP (ref 2–14)
NEUTROPHILS # BLD AUTO: 2.66 K/UL — SIGNIFICANT CHANGE UP (ref 1.8–7.4)
NEUTROPHILS NFR BLD AUTO: 44.7 % — SIGNIFICANT CHANGE UP (ref 43–77)
PLATELET # BLD AUTO: 234 K/UL — SIGNIFICANT CHANGE UP (ref 150–400)
POTASSIUM SERPL-MCNC: 3.3 MMOL/L — LOW (ref 3.5–5.3)
POTASSIUM SERPL-SCNC: 3.3 MMOL/L — LOW (ref 3.5–5.3)
RBC # BLD: 4.46 M/UL — SIGNIFICANT CHANGE UP (ref 3.8–5.2)
RBC # FLD: 12.8 % — SIGNIFICANT CHANGE UP (ref 10.3–14.5)
SODIUM SERPL-SCNC: 141 MMOL/L — SIGNIFICANT CHANGE UP (ref 135–145)
WBC # BLD: 5.95 K/UL — SIGNIFICANT CHANGE UP (ref 3.8–10.5)
WBC # FLD AUTO: 5.95 K/UL — SIGNIFICANT CHANGE UP (ref 3.8–10.5)

## 2022-02-20 PROCEDURE — 99233 SBSQ HOSP IP/OBS HIGH 50: CPT

## 2022-02-20 RX ORDER — POTASSIUM CHLORIDE 20 MEQ
40 PACKET (EA) ORAL ONCE
Refills: 0 | Status: COMPLETED | OUTPATIENT
Start: 2022-02-20 | End: 2022-02-20

## 2022-02-20 RX ADMIN — Medication 6: at 08:14

## 2022-02-20 RX ADMIN — CARVEDILOL PHOSPHATE 6.25 MILLIGRAM(S): 80 CAPSULE, EXTENDED RELEASE ORAL at 10:33

## 2022-02-20 RX ADMIN — CARVEDILOL PHOSPHATE 6.25 MILLIGRAM(S): 80 CAPSULE, EXTENDED RELEASE ORAL at 22:01

## 2022-02-20 RX ADMIN — HEPARIN SODIUM 5000 UNIT(S): 5000 INJECTION INTRAVENOUS; SUBCUTANEOUS at 05:43

## 2022-02-20 RX ADMIN — Medication 40 MILLIEQUIVALENT(S): at 10:33

## 2022-02-20 RX ADMIN — Medication 16 UNIT(S): at 08:14

## 2022-02-20 RX ADMIN — HEPARIN SODIUM 5000 UNIT(S): 5000 INJECTION INTRAVENOUS; SUBCUTANEOUS at 21:45

## 2022-02-20 RX ADMIN — INSULIN GLARGINE 60 UNIT(S): 100 INJECTION, SOLUTION SUBCUTANEOUS at 21:44

## 2022-02-20 RX ADMIN — FAMOTIDINE 20 MILLIGRAM(S): 10 INJECTION INTRAVENOUS at 16:41

## 2022-02-20 RX ADMIN — Medication 6: at 21:44

## 2022-02-20 RX ADMIN — INSULIN GLARGINE 50 UNIT(S): 100 INJECTION, SOLUTION SUBCUTANEOUS at 08:13

## 2022-02-20 RX ADMIN — Medication 2: at 11:59

## 2022-02-20 RX ADMIN — AMLODIPINE BESYLATE 10 MILLIGRAM(S): 2.5 TABLET ORAL at 05:43

## 2022-02-20 RX ADMIN — Medication 16 UNIT(S): at 12:00

## 2022-02-20 RX ADMIN — Medication 16 UNIT(S): at 16:42

## 2022-02-20 RX ADMIN — FAMOTIDINE 20 MILLIGRAM(S): 10 INJECTION INTRAVENOUS at 05:43

## 2022-02-20 NOTE — PROGRESS NOTE ADULT - ASSESSMENT
61 year old female with PMH of HTN, asthma, VINICIUS, remote thyroidectomy, coming to hospital with complaints of dehydration and hypernatremia, found to have new diagnosis of diabetes type 2 w/ dka and admitted to MICU. Now improved and downgraded to medicine for further care.     Type 2 Diabetes, new onset uncontrolled w/ hyperglycemia  BG still high but trends are improving    c/w current Lantus and Admelog doses   c/w sliding scale   Monitor fingersticks   Appreciate Endo consult   A1c 14.7   Diabetic teaching to be done today     Hypernatremia  probable 2/2 dehydration   Improving   encourage PO hydration     HTN, Essential  c/w Coreg and Amlodipine   Monitor blood pressure     GERD   c/w famotidine    ANTONINA  US renal reviewed with no acute pathology   Appreciate Nephro consult   Monitor cr   Avoid nephrotoxic meds     s/p partial thyroidectomy  Normal TSH     DVT Prophylaxis: Heparin SC    Dispo: Likely DC in 1-2 days  61 year old female with PMH of HTN, asthma, VINICIUS, remote thyroidectomy, coming to hospital with complaints of dehydration and hypernatremia, found to have new diagnosis of diabetes type 2 w/ dka and admitted to MICU. Now improved and downgraded to medicine for further care.     Type 2 Diabetes, new onset uncontrolled w/ hyperglycemia  BG still high but trends are improving    c/w current Lantus and Admelog doses   c/w sliding scale   Monitor fingersticks   Appreciate Endo consult   A1c 14.7   Diabetic teaching to be done today     Hypernatremia  probable 2/2 dehydration   Improving   encourage PO hydration     Hypokalemia   Repleted   Monitor K, reordered for AM    HTN, Essential  c/w Coreg and Amlodipine   Monitor blood pressure     GERD   c/w famotidine    ANTONINA  US renal reviewed with no acute pathology   Appreciate Nephro consult   Monitor cr   Avoid nephrotoxic meds     s/p partial thyroidectomy  Normal TSH     DVT Prophylaxis: Heparin SC    Dispo: Likely DC in 1-2 days

## 2022-02-20 NOTE — PROGRESS NOTE ADULT - SUBJECTIVE AND OBJECTIVE BOX
[facility] Division of Hospital Medicine    Chief Complaint:  DKA    SUBJECTIVE / OVERNIGHT EVENTS:  Pt reports feeling better but stil tired  Still feels thirsty     Patient denies chest pain, SOB, abd pain, N/V, fever, chills, dysuria or any other complaints. All remainder ROS negative.     MEDICATIONS  (STANDING):  amLODIPine   Tablet 10 milliGRAM(s) Oral daily  carvedilol 6.25 milliGRAM(s) Oral every 12 hours  chlorhexidine 4% Liquid 1 Application(s) Topical <User Schedule>  dextrose 40% Gel 15 Gram(s) Oral once  dextrose 5%. 1000 milliLiter(s) (50 mL/Hr) IV Continuous <Continuous>  dextrose 5%. 1000 milliLiter(s) (100 mL/Hr) IV Continuous <Continuous>  dextrose 50% Injectable 25 Gram(s) IV Push once  dextrose 50% Injectable 12.5 Gram(s) IV Push once  dextrose 50% Injectable 25 Gram(s) IV Push once  famotidine Injectable 20 milliGRAM(s) IV Push every 12 hours  glucagon  Injectable 1 milliGRAM(s) IntraMuscular once  heparin   Injectable 5000 Unit(s) SubCutaneous every 8 hours  insulin glargine Injectable (LANTUS) 50 Unit(s) SubCutaneous every morning  insulin glargine Injectable (LANTUS) 60 Unit(s) SubCutaneous at bedtime  insulin lispro (ADMELOG) corrective regimen sliding scale   SubCutaneous Before meals and at bedtime  insulin lispro Injectable (ADMELOG) 16 Unit(s) SubCutaneous three times a day with meals  sodium chloride 0.45%. 1000 milliLiter(s) (75 mL/Hr) IV Continuous <Continuous>    MEDICATIONS  (PRN):        I&O's Summary      PHYSICAL EXAM:  Vital Signs Last 24 Hrs  T(C): 36.8 (20 Feb 2022 08:55), Max: 36.8 (20 Feb 2022 08:55)  T(F): 98.2 (20 Feb 2022 08:55), Max: 98.2 (20 Feb 2022 08:55)  HR: 98 (20 Feb 2022 08:55) (83 - 99)  BP: 149/86 (20 Feb 2022 08:55) (140/84 - 179/110)  BP(mean): --  RR: 18 (20 Feb 2022 08:55) (18 - 18)  SpO2: 100% (20 Feb 2022 08:55) (96% - 100%)      CONSTITUTIONAL: NAD, sitting up in bed  ENMT: Moist oral mucosa, PERRLA, EOMI   RESPIRATORY: Normal respiratory effort; lungs are clear to auscultation bilaterally  CARDIOVASCULAR: Regular rate and rhythm, normal S1 and S2, No lower extremity edema  ABDOMEN: Obese, Nontender to palpation, normoactive bowel sounds  MUSCULOSKELETAL: No clubbing or cyanosis of digits  PSYCH: A+O to person, place, and time; affect appropriate  NEUROLOGY: No gross sensory or motor deficits   SKIN: No rashes; no palpable lesions      LABS:                        13.1   5.95  )-----------( 234      ( 20 Feb 2022 06:39 )             40.9     02-20    141  |  103  |  25.3<H>  ----------------------------<  254<H>  3.3<L>   |  25.0  |  0.84    Ca    8.5<L>      20 Feb 2022 06:39  Phos  2.9     02-19  Mg     2.8     02-19    TPro  7.1  /  Alb  3.2<L>  /  TBili  0.4  /  DBili  x   /  AST  39<H>  /  ALT  20  /  AlkPhos  121<H>  02-19              Culture - Urine (collected 18 Feb 2022 06:22)  Source: Clean Catch Clean Catch (Midstream)  Final Report (19 Feb 2022 08:45):    <10,000 CFU/mL Normal Urogenital Judi      CAPILLARY BLOOD GLUCOSE      POCT Blood Glucose.: 191 mg/dL (20 Feb 2022 11:56)  POCT Blood Glucose.: 292 mg/dL (20 Feb 2022 08:10)  POCT Blood Glucose.: 302 mg/dL (19 Feb 2022 22:18)  POCT Blood Glucose.: 210 mg/dL (19 Feb 2022 16:20)

## 2022-02-21 LAB
ANION GAP SERPL CALC-SCNC: 12 MMOL/L — SIGNIFICANT CHANGE UP (ref 5–17)
BUN SERPL-MCNC: 16.3 MG/DL — SIGNIFICANT CHANGE UP (ref 8–20)
CALCIUM SERPL-MCNC: 8.9 MG/DL — SIGNIFICANT CHANGE UP (ref 8.6–10.2)
CHLORIDE SERPL-SCNC: 105 MMOL/L — SIGNIFICANT CHANGE UP (ref 98–107)
CO2 SERPL-SCNC: 25 MMOL/L — SIGNIFICANT CHANGE UP (ref 22–29)
CREAT SERPL-MCNC: 0.65 MG/DL — SIGNIFICANT CHANGE UP (ref 0.5–1.3)
GLUCOSE BLDC GLUCOMTR-MCNC: 102 MG/DL — HIGH (ref 70–99)
GLUCOSE BLDC GLUCOMTR-MCNC: 152 MG/DL — HIGH (ref 70–99)
GLUCOSE BLDC GLUCOMTR-MCNC: 153 MG/DL — HIGH (ref 70–99)
GLUCOSE BLDC GLUCOMTR-MCNC: 213 MG/DL — HIGH (ref 70–99)
GLUCOSE SERPL-MCNC: 150 MG/DL — HIGH (ref 70–99)
POTASSIUM SERPL-MCNC: 3.5 MMOL/L — SIGNIFICANT CHANGE UP (ref 3.5–5.3)
POTASSIUM SERPL-SCNC: 3.5 MMOL/L — SIGNIFICANT CHANGE UP (ref 3.5–5.3)
SODIUM SERPL-SCNC: 142 MMOL/L — SIGNIFICANT CHANGE UP (ref 135–145)

## 2022-02-21 PROCEDURE — 99232 SBSQ HOSP IP/OBS MODERATE 35: CPT

## 2022-02-21 RX ORDER — INSULIN GLARGINE 100 [IU]/ML
50 INJECTION, SOLUTION SUBCUTANEOUS AT BEDTIME
Refills: 0 | Status: DISCONTINUED | OUTPATIENT
Start: 2022-02-21 | End: 2022-02-24

## 2022-02-21 RX ADMIN — Medication 16 UNIT(S): at 11:29

## 2022-02-21 RX ADMIN — Medication 4: at 21:44

## 2022-02-21 RX ADMIN — HEPARIN SODIUM 5000 UNIT(S): 5000 INJECTION INTRAVENOUS; SUBCUTANEOUS at 21:28

## 2022-02-21 RX ADMIN — CARVEDILOL PHOSPHATE 6.25 MILLIGRAM(S): 80 CAPSULE, EXTENDED RELEASE ORAL at 11:25

## 2022-02-21 RX ADMIN — HEPARIN SODIUM 5000 UNIT(S): 5000 INJECTION INTRAVENOUS; SUBCUTANEOUS at 05:34

## 2022-02-21 RX ADMIN — FAMOTIDINE 20 MILLIGRAM(S): 10 INJECTION INTRAVENOUS at 05:35

## 2022-02-21 RX ADMIN — HEPARIN SODIUM 5000 UNIT(S): 5000 INJECTION INTRAVENOUS; SUBCUTANEOUS at 13:52

## 2022-02-21 RX ADMIN — INSULIN GLARGINE 50 UNIT(S): 100 INJECTION, SOLUTION SUBCUTANEOUS at 21:27

## 2022-02-21 RX ADMIN — CARVEDILOL PHOSPHATE 6.25 MILLIGRAM(S): 80 CAPSULE, EXTENDED RELEASE ORAL at 21:27

## 2022-02-21 RX ADMIN — AMLODIPINE BESYLATE 10 MILLIGRAM(S): 2.5 TABLET ORAL at 05:34

## 2022-02-21 RX ADMIN — CHLORHEXIDINE GLUCONATE 1 APPLICATION(S): 213 SOLUTION TOPICAL at 07:27

## 2022-02-21 RX ADMIN — Medication 16 UNIT(S): at 17:11

## 2022-02-21 RX ADMIN — Medication 2: at 11:29

## 2022-02-21 RX ADMIN — Medication 2: at 08:14

## 2022-02-21 RX ADMIN — INSULIN GLARGINE 50 UNIT(S): 100 INJECTION, SOLUTION SUBCUTANEOUS at 08:14

## 2022-02-21 RX ADMIN — FAMOTIDINE 20 MILLIGRAM(S): 10 INJECTION INTRAVENOUS at 17:13

## 2022-02-21 RX ADMIN — Medication 16 UNIT(S): at 08:15

## 2022-02-21 NOTE — PROGRESS NOTE ADULT - ASSESSMENT
60 y/o F with a h/o HTN, asthma, VINICIUS, remote thyroidectomy admit with severe DKA, newly Dx T2DM, hypernatremia and ANTONINA    1. New onset T2DM - A1c 14.7%, s/p DKA, blood sugars improving  - Lantus decreased to 50 units BID  - Continue Admelog 16 units TID with meals and SSI  - Cpeptide/QI ordered to rule out T1DM  - Will f/u with our office outpatient  - Continue insulin/diabetic education    2. S/p partial thyroidectomy  - Normal TSH, no thyroid hormone needed     62 y/o F with a h/o HTN, asthma, VINICIUS, remote thyroidectomy admit with severe DKA, newly Dx T2DM, hypernatremia and ANTONINA    1. New onset T2DM - A1c 14.7%, s/p DKA, blood sugars improving  - Lantus decreased to 50 units BID  - Continue Admelog 16 units TID with meals and SSI  - Cpeptide/QI ordered to rule out T1DM  - Will f/u with our office outpatient  - Continue insulin/diabetic education    Endocrine [facility]  [facility] [place] [address] [facility], 2nd floor  March 15th at 8:00am    2. S/p partial thyroidectomy  - Normal TSH, no thyroid hormone needed

## 2022-02-21 NOTE — PROGRESS NOTE ADULT - ASSESSMENT
Improved  ANTONINA   New onset DM , resolving DKA   Feels much better   Insulin / IVF noted ---> adjusted   No renal lesions on NC CT abdomen   No proteinuria     Hypernatremia - resolved   Urine NA < 20 , Uosm 600    HTN - Watch on meds

## 2022-02-21 NOTE — PROGRESS NOTE ADULT - SUBJECTIVE AND OBJECTIVE BOX
NEPHROLOGY INTERVAL HPI/OVERNIGHT EVENTS:    feels better   Interim noted   Meds reviewed     MEDICATIONS  (STANDING):  amLODIPine   Tablet 10 milliGRAM(s) Oral daily  carvedilol 6.25 milliGRAM(s) Oral every 12 hours  chlorhexidine 4% Liquid 1 Application(s) Topical <User Schedule>  dextrose 40% Gel 15 Gram(s) Oral once  dextrose 5%. 1000 milliLiter(s) (50 mL/Hr) IV Continuous <Continuous>  dextrose 5%. 1000 milliLiter(s) (100 mL/Hr) IV Continuous <Continuous>  dextrose 50% Injectable 25 Gram(s) IV Push once  dextrose 50% Injectable 12.5 Gram(s) IV Push once  dextrose 50% Injectable 25 Gram(s) IV Push once  famotidine Injectable 20 milliGRAM(s) IV Push every 12 hours  glucagon  Injectable 1 milliGRAM(s) IntraMuscular once  heparin   Injectable 5000 Unit(s) SubCutaneous every 8 hours  insulin glargine Injectable (LANTUS) 50 Unit(s) SubCutaneous every morning  insulin glargine Injectable (LANTUS) 50 Unit(s) SubCutaneous at bedtime  insulin lispro (ADMELOG) corrective regimen sliding scale   SubCutaneous Before meals and at bedtime  insulin lispro Injectable (ADMELOG) 16 Unit(s) SubCutaneous three times a day with meals  sodium chloride 0.45%. 1000 milliLiter(s) (75 mL/Hr) IV Continuous <Continuous>    MEDICATIONS  (PRN):      Allergies    No Known Allergies    Intolerances            Vital Signs Last 24 Hrs  T(C): 36.7 (21 Feb 2022 10:00), Max: 36.9 (20 Feb 2022 21:53)  T(F): 98 (21 Feb 2022 10:00), Max: 98.4 (20 Feb 2022 21:53)  HR: 87 (21 Feb 2022 10:00) (76 - 89)  BP: 132/85 (21 Feb 2022 10:00) (132/85 - 141/68)  BP(mean): --  RR: 18 (21 Feb 2022 10:00) (18 - 19)  SpO2: 100% (21 Feb 2022 10:00) (98% - 100%)  Daily     Daily   I&O's Detail    20 Feb 2022 07:01  -  21 Feb 2022 07:00  --------------------------------------------------------  IN:    Oral Fluid: 750 mL  Total IN: 750 mL    OUT:    IV PiggyBack: 0 mL    sodium chloride 0.45%: 0 mL    Voided (mL): 0 mL  Total OUT: 0 mL    Total NET: 750 mL        I&O's Summary    20 Feb 2022 07:01  -  21 Feb 2022 07:00  --------------------------------------------------------  IN: 750 mL / OUT: 0 mL / NET: 750 mL        PHYSICAL EXAM:    GENERAL: NAD  HEAD:  Atraumatic, Normocephalic  EYES: EOMI, PERRLA, conjunctiva and sclera clear  ENMT: No tonsillar erythema, exudates, or enlargement; Moist mucous membranes, Good dentition, No lesions  NECK: Supple, No JVD, Normal thyroid  NERVOUS SYSTEM:  Alert & Oriented X3, Good concentration; Motor Strength 5/5 B/L upper and lower extremities; DTRs 2+ intact and symmetric  CHEST/LUNG: Clear to percussion bilaterally; No rales, rhonchi, wheezing, or rubs  HEART: Regular rate and rhythm; No murmurs, rubs, or gallops  ABDOMEN: Soft, Nontender, Nondistended; Bowel sounds present  EXTREMITIES:  2+ Peripheral Pulses, No clubbing, cyanosis, or edema  LABS:                        13.1   5.95  )-----------( 234      ( 20 Feb 2022 06:39 )             40.9     02-21    142  |  105  |  16.3  ----------------------------<  150<H>  3.5   |  25.0  |  0.65    Ca    8.9      21 Feb 2022 06:53                  RADIOLOGY & ADDITIONAL TESTS:

## 2022-02-21 NOTE — PROGRESS NOTE ADULT - ASSESSMENT
61 year old female with PMH of HTN, asthma, VINICIUS, remote thyroidectomy, coming to hospital with complaints of dehydration and hypernatremia, found to have new diagnosis of diabetes type 2 w/ dka and admitted to MICU. Now improved and downgraded to medicine for further care.     Type 2 Diabetes, new onset uncontrolled w/ hyperglycemia  BG still high at night but trends are improving    c/w current Lantus and Admelog doses, will discuss adjustments with Endo today   c/w sliding scale   Monitor fingersticks   Appreciate Endo consult  A1c 14.7   Diabetic teaching ongoing      Hypernatremia  probable 2/2 dehydration   Improving   encourage PO hydration     Hypokalemia   resolved     HTN, Essential  c/w Coreg and Amlodipine   Monitor blood pressure     GERD   c/w famotidine    ANTONINA  US renal reviewed with no acute pathology   Appreciate Nephro consult   Monitor cr   Avoid nephrotoxic meds     s/p partial thyroidectomy  Normal TSH     DVT Prophylaxis: Heparin SC    Dispo: Likely DC tomorrow

## 2022-02-21 NOTE — PROGRESS NOTE ADULT - SUBJECTIVE AND OBJECTIVE BOX
[facility] Division of Hospital Medicine    Chief Complaint:  DKA    SUBJECTIVE / OVERNIGHT EVENTS:  Pt reports feeling better   Asking when she can go home   Thirst is improving     Patient denies chest pain, SOB, abd pain, N/V, fever, chills, dysuria or any other complaints. All remainder ROS negative.     MEDICATIONS  (STANDING):  amLODIPine   Tablet 10 milliGRAM(s) Oral daily  carvedilol 6.25 milliGRAM(s) Oral every 12 hours  chlorhexidine 4% Liquid 1 Application(s) Topical <User Schedule>  dextrose 40% Gel 15 Gram(s) Oral once  dextrose 5%. 1000 milliLiter(s) (50 mL/Hr) IV Continuous <Continuous>  dextrose 5%. 1000 milliLiter(s) (100 mL/Hr) IV Continuous <Continuous>  dextrose 50% Injectable 25 Gram(s) IV Push once  dextrose 50% Injectable 12.5 Gram(s) IV Push once  dextrose 50% Injectable 25 Gram(s) IV Push once  famotidine Injectable 20 milliGRAM(s) IV Push every 12 hours  glucagon  Injectable 1 milliGRAM(s) IntraMuscular once  heparin   Injectable 5000 Unit(s) SubCutaneous every 8 hours  insulin glargine Injectable (LANTUS) 50 Unit(s) SubCutaneous every morning  insulin glargine Injectable (LANTUS) 60 Unit(s) SubCutaneous at bedtime  insulin lispro (ADMELOG) corrective regimen sliding scale   SubCutaneous Before meals and at bedtime  insulin lispro Injectable (ADMELOG) 16 Unit(s) SubCutaneous three times a day with meals  sodium chloride 0.45%. 1000 milliLiter(s) (75 mL/Hr) IV Continuous <Continuous>    MEDICATIONS  (PRN):        I&O's Summary    20 Feb 2022 07:01  -  21 Feb 2022 07:00  --------------------------------------------------------  IN: 750 mL / OUT: 0 mL / NET: 750 mL        PHYSICAL EXAM:  Vital Signs Last 24 Hrs  T(C): 36.7 (21 Feb 2022 10:00), Max: 36.9 (20 Feb 2022 21:53)  T(F): 98 (21 Feb 2022 10:00), Max: 98.4 (20 Feb 2022 21:53)  HR: 87 (21 Feb 2022 10:00) (76 - 89)  BP: 132/85 (21 Feb 2022 10:00) (132/85 - 141/68)  BP(mean): --  RR: 18 (21 Feb 2022 10:00) (18 - 19)  SpO2: 100% (21 Feb 2022 10:00) (98% - 100%)      CONSTITUTIONAL: NAD, sitting up in bed  ENMT: Moist oral mucosa, PERRLA, EOMI   RESPIRATORY: Normal respiratory effort; lungs are clear to auscultation bilaterally  CARDIOVASCULAR: Regular rate and rhythm, normal S1 and S2, No lower extremity edema  ABDOMEN: Obese, Nontender to palpation, normoactive bowel sounds  MUSCULOSKELETAL: No clubbing or cyanosis of digits  PSYCH: A+O to person, place, and time; affect appropriate  NEUROLOGY: No gross sensory or motor deficits   SKIN: No rashes; no palpable lesions      LABS:                        13.1   5.95  )-----------( 234      ( 20 Feb 2022 06:39 )             40.9     02-21    142  |  105  |  16.3  ----------------------------<  150<H>  3.5   |  25.0  |  0.65    Ca    8.9      21 Feb 2022 06:53        CAPILLARY BLOOD GLUCOSE      POCT Blood Glucose.: 152 mg/dL (21 Feb 2022 11:24)  POCT Blood Glucose.: 153 mg/dL (21 Feb 2022 07:58)  POCT Blood Glucose.: 300 mg/dL (20 Feb 2022 21:20)  POCT Blood Glucose.: 120 mg/dL (20 Feb 2022 16:38)  POCT Blood Glucose.: 191 mg/dL (20 Feb 2022 11:56)

## 2022-02-21 NOTE — PROGRESS NOTE ADULT - SUBJECTIVE AND OBJECTIVE BOX
CC: Follow up new onset diabetes    INTERVAL HPI/OVERNIGHT EVENTS:  Denies pain or discomfort at time of exam    ROS: Patient denies chest pain, SOB, abd pain, N/V, or any other complaints. All remainder ROS negative.     MEDICATIONS  (STANDING):  amLODIPine   Tablet 10 milliGRAM(s) Oral daily  carvedilol 6.25 milliGRAM(s) Oral every 12 hours  chlorhexidine 4% Liquid 1 Application(s) Topical <User Schedule>  dextrose 40% Gel 15 Gram(s) Oral once  dextrose 5%. 1000 milliLiter(s) (50 mL/Hr) IV Continuous <Continuous>  dextrose 5%. 1000 milliLiter(s) (100 mL/Hr) IV Continuous <Continuous>  dextrose 50% Injectable 25 Gram(s) IV Push once  dextrose 50% Injectable 12.5 Gram(s) IV Push once  dextrose 50% Injectable 25 Gram(s) IV Push once  famotidine Injectable 20 milliGRAM(s) IV Push every 12 hours  glucagon  Injectable 1 milliGRAM(s) IntraMuscular once  heparin   Injectable 5000 Unit(s) SubCutaneous every 8 hours  insulin glargine Injectable (LANTUS) 50 Unit(s) SubCutaneous every morning  insulin glargine Injectable (LANTUS) 50 Unit(s) SubCutaneous at bedtime  insulin lispro (ADMELOG) corrective regimen sliding scale   SubCutaneous Before meals and at bedtime  insulin lispro Injectable (ADMELOG) 16 Unit(s) SubCutaneous three times a day with meals  sodium chloride 0.45%. 1000 milliLiter(s) (75 mL/Hr) IV Continuous <Continuous>    MEDICATIONS  (PRN):    Allergies  No Known Allergies    Vital Signs Last 24 Hrs  T(C): 36.7 (21 Feb 2022 10:00), Max: 36.9 (20 Feb 2022 21:53)  T(F): 98 (21 Feb 2022 10:00), Max: 98.4 (20 Feb 2022 21:53)  HR: 87 (21 Feb 2022 10:00) (76 - 89)  BP: 132/85 (21 Feb 2022 10:00) (132/85 - 141/68)  BP(mean): --  RR: 18 (21 Feb 2022 10:00) (18 - 19)  SpO2: 100% (21 Feb 2022 10:00) (98% - 100%)    PHYSICAL EXAM:  General: No apparent distress, obese female  Neck: Supple, trachea midline, no thyromegaly  Respiratory: Lungs clear bilaterally, normal rate, effort  Cardiac: +S1, S2, no m/r/g  GI: +BS, soft, non tender, non distended  Extremities: No peripheral edema, no pedal lesions  Neuro: A+O X3, no tremor  Skin: (+) acanthosis       LABS:                        13.1   5.95  )-----------( 234      ( 20 Feb 2022 06:39 )             40.9     02-21    142  |  105  |  16.3  ----------------------------<  150<H>  3.5   |  25.0  |  0.65    Ca    8.9      21 Feb 2022 06:53      POCT Blood Glucose.: 152 mg/dL (02-21-22 @ 11:24)  POCT Blood Glucose.: 153 mg/dL (02-21-22 @ 07:58)  POCT Blood Glucose.: 300 mg/dL (02-20-22 @ 21:20)  POCT Blood Glucose.: 120 mg/dL (02-20-22 @ 16:38)    Thyroid Stimulating Hormone, Serum: 0.86 uIU/mL (02-17-22 @ 23:35)

## 2022-02-22 LAB
ALBUMIN SERPL ELPH-MCNC: 3.3 G/DL — SIGNIFICANT CHANGE UP (ref 3.3–5.2)
ALP SERPL-CCNC: 99 U/L — SIGNIFICANT CHANGE UP (ref 40–120)
ALT FLD-CCNC: 94 U/L — HIGH
ANION GAP SERPL CALC-SCNC: 12 MMOL/L — SIGNIFICANT CHANGE UP (ref 5–17)
ANION GAP SERPL CALC-SCNC: 13 MMOL/L — SIGNIFICANT CHANGE UP (ref 5–17)
APTT BLD: 28.1 SEC — SIGNIFICANT CHANGE UP (ref 27.5–35.5)
AST SERPL-CCNC: 193 U/L — HIGH
BASOPHILS # BLD AUTO: 0.02 K/UL — SIGNIFICANT CHANGE UP (ref 0–0.2)
BASOPHILS NFR BLD AUTO: 0.5 % — SIGNIFICANT CHANGE UP (ref 0–2)
BILIRUB SERPL-MCNC: 0.2 MG/DL — LOW (ref 0.4–2)
BUN SERPL-MCNC: 10 MG/DL — SIGNIFICANT CHANGE UP (ref 8–20)
BUN SERPL-MCNC: 13.4 MG/DL — SIGNIFICANT CHANGE UP (ref 8–20)
C PEPTIDE SERPL-MCNC: 0.9 NG/ML — LOW (ref 1.1–4.4)
CALCIUM SERPL-MCNC: 8.6 MG/DL — SIGNIFICANT CHANGE UP (ref 8.6–10.2)
CALCIUM SERPL-MCNC: 8.8 MG/DL — SIGNIFICANT CHANGE UP (ref 8.6–10.2)
CHLORIDE SERPL-SCNC: 102 MMOL/L — SIGNIFICANT CHANGE UP (ref 98–107)
CHLORIDE SERPL-SCNC: 103 MMOL/L — SIGNIFICANT CHANGE UP (ref 98–107)
CK SERPL-CCNC: 98 U/L — SIGNIFICANT CHANGE UP (ref 25–170)
CO2 SERPL-SCNC: 24 MMOL/L — SIGNIFICANT CHANGE UP (ref 22–29)
CO2 SERPL-SCNC: 25 MMOL/L — SIGNIFICANT CHANGE UP (ref 22–29)
CREAT SERPL-MCNC: 0.55 MG/DL — SIGNIFICANT CHANGE UP (ref 0.5–1.3)
CREAT SERPL-MCNC: 0.74 MG/DL — SIGNIFICANT CHANGE UP (ref 0.5–1.3)
EOSINOPHIL # BLD AUTO: 0.16 K/UL — SIGNIFICANT CHANGE UP (ref 0–0.5)
EOSINOPHIL NFR BLD AUTO: 4.1 % — SIGNIFICANT CHANGE UP (ref 0–6)
GLUCOSE BLDC GLUCOMTR-MCNC: 170 MG/DL — HIGH (ref 70–99)
GLUCOSE BLDC GLUCOMTR-MCNC: 182 MG/DL — HIGH (ref 70–99)
GLUCOSE BLDC GLUCOMTR-MCNC: 247 MG/DL — HIGH (ref 70–99)
GLUCOSE BLDC GLUCOMTR-MCNC: 310 MG/DL — HIGH (ref 70–99)
GLUCOSE BLDC GLUCOMTR-MCNC: 337 MG/DL — HIGH (ref 70–99)
GLUCOSE SERPL-MCNC: 174 MG/DL — HIGH (ref 70–99)
GLUCOSE SERPL-MCNC: 185 MG/DL — HIGH (ref 70–99)
HCT VFR BLD CALC: 39 % — SIGNIFICANT CHANGE UP (ref 34.5–45)
HGB BLD-MCNC: 12.9 G/DL — SIGNIFICANT CHANGE UP (ref 11.5–15.5)
IMM GRANULOCYTES NFR BLD AUTO: 0.3 % — SIGNIFICANT CHANGE UP (ref 0–1.5)
INR BLD: 1.08 RATIO — SIGNIFICANT CHANGE UP (ref 0.88–1.16)
LYMPHOCYTES # BLD AUTO: 1.51 K/UL — SIGNIFICANT CHANGE UP (ref 1–3.3)
LYMPHOCYTES # BLD AUTO: 38.8 % — SIGNIFICANT CHANGE UP (ref 13–44)
MCHC RBC-ENTMCNC: 29.9 PG — SIGNIFICANT CHANGE UP (ref 27–34)
MCHC RBC-ENTMCNC: 33.1 GM/DL — SIGNIFICANT CHANGE UP (ref 32–36)
MCV RBC AUTO: 90.3 FL — SIGNIFICANT CHANGE UP (ref 80–100)
MONOCYTES # BLD AUTO: 0.31 K/UL — SIGNIFICANT CHANGE UP (ref 0–0.9)
MONOCYTES NFR BLD AUTO: 8 % — SIGNIFICANT CHANGE UP (ref 2–14)
NEUTROPHILS # BLD AUTO: 1.88 K/UL — SIGNIFICANT CHANGE UP (ref 1.8–7.4)
NEUTROPHILS NFR BLD AUTO: 48.3 % — SIGNIFICANT CHANGE UP (ref 43–77)
PLATELET # BLD AUTO: 220 K/UL — SIGNIFICANT CHANGE UP (ref 150–400)
POTASSIUM SERPL-MCNC: 3.6 MMOL/L — SIGNIFICANT CHANGE UP (ref 3.5–5.3)
POTASSIUM SERPL-MCNC: 3.7 MMOL/L — SIGNIFICANT CHANGE UP (ref 3.5–5.3)
POTASSIUM SERPL-SCNC: 3.6 MMOL/L — SIGNIFICANT CHANGE UP (ref 3.5–5.3)
POTASSIUM SERPL-SCNC: 3.7 MMOL/L — SIGNIFICANT CHANGE UP (ref 3.5–5.3)
PREALB SERPL-MCNC: 17 MG/DL — LOW (ref 18–38)
PROT SERPL-MCNC: 6.8 G/DL — SIGNIFICANT CHANGE UP (ref 6.6–8.7)
PROTHROM AB SERPL-ACNC: 12.5 SEC — SIGNIFICANT CHANGE UP (ref 10.6–13.6)
RBC # BLD: 4.32 M/UL — SIGNIFICANT CHANGE UP (ref 3.8–5.2)
RBC # FLD: 12.7 % — SIGNIFICANT CHANGE UP (ref 10.3–14.5)
SODIUM SERPL-SCNC: 139 MMOL/L — SIGNIFICANT CHANGE UP (ref 135–145)
SODIUM SERPL-SCNC: 140 MMOL/L — SIGNIFICANT CHANGE UP (ref 135–145)
TROPONIN T SERPL-MCNC: <0.01 NG/ML — SIGNIFICANT CHANGE UP (ref 0–0.06)
WBC # BLD: 3.89 K/UL — SIGNIFICANT CHANGE UP (ref 3.8–10.5)
WBC # FLD AUTO: 3.89 K/UL — SIGNIFICANT CHANGE UP (ref 3.8–10.5)

## 2022-02-22 PROCEDURE — 70498 CT ANGIOGRAPHY NECK: CPT | Mod: 26

## 2022-02-22 PROCEDURE — 93306 TTE W/DOPPLER COMPLETE: CPT | Mod: 26

## 2022-02-22 PROCEDURE — 70496 CT ANGIOGRAPHY HEAD: CPT | Mod: 26

## 2022-02-22 PROCEDURE — 99232 SBSQ HOSP IP/OBS MODERATE 35: CPT

## 2022-02-22 PROCEDURE — 99223 1ST HOSP IP/OBS HIGH 75: CPT

## 2022-02-22 PROCEDURE — 99222 1ST HOSP IP/OBS MODERATE 55: CPT

## 2022-02-22 PROCEDURE — 70450 CT HEAD/BRAIN W/O DYE: CPT | Mod: 26,59

## 2022-02-22 PROCEDURE — 99233 SBSQ HOSP IP/OBS HIGH 50: CPT

## 2022-02-22 PROCEDURE — 93010 ELECTROCARDIOGRAM REPORT: CPT

## 2022-02-22 PROCEDURE — 0042T: CPT

## 2022-02-22 RX ORDER — ATORVASTATIN CALCIUM 80 MG/1
40 TABLET, FILM COATED ORAL AT BEDTIME
Refills: 0 | Status: DISCONTINUED | OUTPATIENT
Start: 2022-02-22 | End: 2022-02-24

## 2022-02-22 RX ORDER — ASPIRIN/CALCIUM CARB/MAGNESIUM 324 MG
324 TABLET ORAL ONCE
Refills: 0 | Status: COMPLETED | OUTPATIENT
Start: 2022-02-22 | End: 2022-02-22

## 2022-02-22 RX ORDER — ASPIRIN/CALCIUM CARB/MAGNESIUM 324 MG
81 TABLET ORAL DAILY
Refills: 0 | Status: DISCONTINUED | OUTPATIENT
Start: 2022-02-22 | End: 2022-02-24

## 2022-02-22 RX ADMIN — HEPARIN SODIUM 5000 UNIT(S): 5000 INJECTION INTRAVENOUS; SUBCUTANEOUS at 05:13

## 2022-02-22 RX ADMIN — Medication 2: at 12:54

## 2022-02-22 RX ADMIN — AMLODIPINE BESYLATE 10 MILLIGRAM(S): 2.5 TABLET ORAL at 05:11

## 2022-02-22 RX ADMIN — Medication 324 MILLIGRAM(S): at 11:36

## 2022-02-22 RX ADMIN — Medication 16 UNIT(S): at 17:21

## 2022-02-22 RX ADMIN — HEPARIN SODIUM 5000 UNIT(S): 5000 INJECTION INTRAVENOUS; SUBCUTANEOUS at 22:23

## 2022-02-22 RX ADMIN — ATORVASTATIN CALCIUM 40 MILLIGRAM(S): 80 TABLET, FILM COATED ORAL at 22:23

## 2022-02-22 RX ADMIN — CARVEDILOL PHOSPHATE 6.25 MILLIGRAM(S): 80 CAPSULE, EXTENDED RELEASE ORAL at 08:49

## 2022-02-22 RX ADMIN — FAMOTIDINE 20 MILLIGRAM(S): 10 INJECTION INTRAVENOUS at 17:21

## 2022-02-22 RX ADMIN — Medication 8: at 22:22

## 2022-02-22 RX ADMIN — HEPARIN SODIUM 5000 UNIT(S): 5000 INJECTION INTRAVENOUS; SUBCUTANEOUS at 13:56

## 2022-02-22 RX ADMIN — INSULIN GLARGINE 50 UNIT(S): 100 INJECTION, SOLUTION SUBCUTANEOUS at 08:46

## 2022-02-22 RX ADMIN — INSULIN GLARGINE 50 UNIT(S): 100 INJECTION, SOLUTION SUBCUTANEOUS at 22:22

## 2022-02-22 RX ADMIN — FAMOTIDINE 20 MILLIGRAM(S): 10 INJECTION INTRAVENOUS at 05:11

## 2022-02-22 RX ADMIN — Medication 2: at 08:45

## 2022-02-22 RX ADMIN — Medication 4: at 17:20

## 2022-02-22 RX ADMIN — Medication 16 UNIT(S): at 08:45

## 2022-02-22 RX ADMIN — CARVEDILOL PHOSPHATE 6.25 MILLIGRAM(S): 80 CAPSULE, EXTENDED RELEASE ORAL at 22:23

## 2022-02-22 NOTE — PROGRESS NOTE ADULT - SUBJECTIVE AND OBJECTIVE BOX
NEPHROLOGY INTERVAL HPI/OVERNIGHT EVENTS:    Interim noted   R sighted numbness and tingling has resolved   Feels better   CT angio / perfusion scan OK   MRI result pend     MEDICATIONS  (STANDING):  amLODIPine   Tablet 10 milliGRAM(s) Oral daily  aspirin  chewable 81 milliGRAM(s) Oral daily  atorvastatin 40 milliGRAM(s) Oral at bedtime  carvedilol 6.25 milliGRAM(s) Oral every 12 hours  chlorhexidine 4% Liquid 1 Application(s) Topical <User Schedule>  dextrose 40% Gel 15 Gram(s) Oral once  dextrose 5%. 1000 milliLiter(s) (50 mL/Hr) IV Continuous <Continuous>  dextrose 5%. 1000 milliLiter(s) (100 mL/Hr) IV Continuous <Continuous>  dextrose 50% Injectable 25 Gram(s) IV Push once  dextrose 50% Injectable 12.5 Gram(s) IV Push once  dextrose 50% Injectable 25 Gram(s) IV Push once  famotidine Injectable 20 milliGRAM(s) IV Push every 12 hours  glucagon  Injectable 1 milliGRAM(s) IntraMuscular once  heparin   Injectable 5000 Unit(s) SubCutaneous every 8 hours  insulin glargine Injectable (LANTUS) 50 Unit(s) SubCutaneous every morning  insulin glargine Injectable (LANTUS) 50 Unit(s) SubCutaneous at bedtime  insulin lispro (ADMELOG) corrective regimen sliding scale   SubCutaneous Before meals and at bedtime  insulin lispro Injectable (ADMELOG) 16 Unit(s) SubCutaneous three times a day with meals  sodium chloride 0.45%. 1000 milliLiter(s) (75 mL/Hr) IV Continuous <Continuous>    MEDICATIONS  (PRN):      Allergies    No Known Allergies    Intolerances    Vital Signs Last 24 Hrs  T(C): 36.7 (22 Feb 2022 12:00), Max: 36.9 (21 Feb 2022 15:51)  T(F): 98 (22 Feb 2022 12:00), Max: 98.5 (21 Feb 2022 15:51)  HR: 80 (22 Feb 2022 12:00) (80 - 90)  BP: 131/76 (22 Feb 2022 12:00) (130/81 - 147/90)  BP(mean): 87 (22 Feb 2022 12:00) (87 - 97)  RR: 18 (22 Feb 2022 12:00) (17 - 18)  SpO2: 99% (22 Feb 2022 12:00) (96% - 99%)  Daily     Daily   I&O's Detail    I&O's Summary      PHYSICAL EXAM:  HEAD:  Atraumatic, Normocephalic  EYES: EOMI, PERRLA, conjunctiva and sclera clear  ENMT: No tonsillar erythema, exudates, or enlargement; Moist mucous membranes, Good dentition, No lesions  NECK: Supple, No JVD, Normal thyroid  NERVOUS SYSTEM:  Alert & Oriented X3, Good concentration; Motor Strength 5/5 B/L upper and lower extremities; DTRs 2+ intact and symmetric  CHEST/LUNG: Clear to percussion bilaterally; No rales, rhonchi, wheezing, or rubs  HEART: Regular rate and rhythm; No murmurs, rubs, or gallops  ABDOMEN: Soft, Nontender, Nondistended; Bowel sounds present  EXTREMITIES:  2+ Peripheral Pulses, No clubbing, cyanosis, or edema  LABS:                        12.9   3.89  )-----------( 220      ( 22 Feb 2022 13:57 )             39.0     02-22    139  |  102  |  13.4  ----------------------------<  174<H>  3.6   |  25.0  |  0.74    Ca    8.8      22 Feb 2022 07:23      PT/INR - ( 22 Feb 2022 13:57 )   PT: 12.5 sec;   INR: 1.08 ratio         PTT - ( 22 Feb 2022 13:57 )  PTT:28.1 sec            RADIOLOGY & ADDITIONAL TESTS:

## 2022-02-22 NOTE — CONSULT NOTE ADULT - ATTENDING COMMENTS
61y Female admitted after new onset DM. RRT called due to Right sided parasthesia, concern for CVA.    She reports symptoms have improved.  No focal deficits on exam.  Consult PT/OT for evaluation.  Expect functional progression for discharge to home, pending out of bed evaluation.

## 2022-02-22 NOTE — CONSULT NOTE ADULT - ASSESSMENT
62 y/o F with a h/o HTN, asthma, VINICIUS, remote thyroidectomy admit with severe DKA, newly Dx T2DM, hypernatremia and ANTONINA  1. new onset T2DM (A1c 14.7%) admit with DKA - glucoses improved, DKA resolved but remains hyperglycemic  - pt was receiving 11 units/hour insulin at time of examination  - transition to basal/bolus insulin per MICU protocol  - will monitor sugars and adjust insulin as needed  - explained to daughter and patient the new dx of diabetes and interpretation of labs results (high A1c), we also discussed differenced btw T1DM and T2DM, we also discussed that insulin therapy would be best for her at this time  - insulin teaching and diabetic education while in hospital, daughter would like to learn how to inject insulin     2. s/p partial thyroidectomy -normal TSH, no thyroid hormone needed    3. hypernatremia - due to dehydration  - cont IVF  - increase PO water intake    4. ANTONINA due to dehydration- cont IV hydration, monitor labs  
The patient is a 61y Female who is followed by neurology because of transient paresthesia    Possible TIA  risk factors of diabetes, HTN  Suggest the following:    - MRI brain  - echocardiogram  - continue ASA 81  - lipid profile, goal LDL<70, continue lipitor 40, may need 80 based on labs  - JqqU0z=40.7%, will need tighter glycemci control  - DVT prophylaxis    discussed with Dr Connell    will follow with you    Shashi Hampton MD PhD   [id number]
Improving ANTONINA   New onset DM , resolving DKA   Feels much better   Insulin / IVF noted   No renal lesions on NC CT abdomen   No proteinuria     Hypernatremia - Converted to 1/2 NS   Urine NA < 20 , Uosm 600    HTN - Watch on meds     Labs in am   Will follow

## 2022-02-22 NOTE — PROGRESS NOTE ADULT - SUBJECTIVE AND OBJECTIVE BOX
[facility] Division of Hospital Medicine    Chief Complaint:  DKA    SUBJECTIVE / OVERNIGHT EVENTS:  Pt reports she has been having tingling in her R arm and leg since last night at approx 10 PM but did not think much of it, did not report to it to any staff   RRT this AM, see note     Patient denies chest pain, SOB, abd pain, N/V, fever, chills, dysuria or any other complaints. All remainder ROS negative.     MEDICATIONS  (STANDING):  amLODIPine   Tablet 10 milliGRAM(s) Oral daily  aspirin  chewable 81 milliGRAM(s) Oral daily  atorvastatin 40 milliGRAM(s) Oral at bedtime  carvedilol 6.25 milliGRAM(s) Oral every 12 hours  chlorhexidine 4% Liquid 1 Application(s) Topical <User Schedule>  dextrose 40% Gel 15 Gram(s) Oral once  dextrose 5%. 1000 milliLiter(s) (50 mL/Hr) IV Continuous <Continuous>  dextrose 5%. 1000 milliLiter(s) (100 mL/Hr) IV Continuous <Continuous>  dextrose 50% Injectable 25 Gram(s) IV Push once  dextrose 50% Injectable 12.5 Gram(s) IV Push once  dextrose 50% Injectable 25 Gram(s) IV Push once  famotidine Injectable 20 milliGRAM(s) IV Push every 12 hours  glucagon  Injectable 1 milliGRAM(s) IntraMuscular once  heparin   Injectable 5000 Unit(s) SubCutaneous every 8 hours  insulin glargine Injectable (LANTUS) 50 Unit(s) SubCutaneous at bedtime  insulin glargine Injectable (LANTUS) 50 Unit(s) SubCutaneous every morning  insulin lispro (ADMELOG) corrective regimen sliding scale   SubCutaneous Before meals and at bedtime  insulin lispro Injectable (ADMELOG) 16 Unit(s) SubCutaneous three times a day with meals  sodium chloride 0.45%. 1000 milliLiter(s) (75 mL/Hr) IV Continuous <Continuous>    MEDICATIONS  (PRN):        I&O's Summary      PHYSICAL EXAM:  Vital Signs Last 24 Hrs  T(C): 36.7 (22 Feb 2022 12:00), Max: 36.9 (21 Feb 2022 15:51)  T(F): 98 (22 Feb 2022 12:00), Max: 98.5 (21 Feb 2022 15:51)  HR: 80 (22 Feb 2022 12:00) (80 - 90)  BP: 131/76 (22 Feb 2022 12:00) (130/81 - 147/90)  BP(mean): 87 (22 Feb 2022 12:00) (87 - 97)  RR: 18 (22 Feb 2022 12:00) (17 - 18)  SpO2: 99% (22 Feb 2022 12:00) (96% - 99%)      CONSTITUTIONAL: NAD, sitting up in bed  ENMT: Moist oral mucosa, PERRLA, EOMI   RESPIRATORY: Normal respiratory effort; lungs are clear to auscultation bilaterally  CARDIOVASCULAR: Regular rate and rhythm, normal S1 and S2, No lower extremity edema  ABDOMEN: Obese, Nontender to palpation, normoactive bowel sounds  MUSCULOSKELETAL: No clubbing or cyanosis of digits  PSYCH: A+O to person, place, and time; affect appropriate  NEUROLOGY: No gross sensory or motor deficits   SKIN: No rashes; no palpable lesions       LABS:     02-22    139  |  102  |  13.4  ----------------------------<  174<H>  3.6   |  25.0  |  0.74    Ca    8.8      22 Feb 2022 07:23      CAPILLARY BLOOD GLUCOSE      POCT Blood Glucose.: 182 mg/dL (22 Feb 2022 12:22)  POCT Blood Glucose.: 310 mg/dL (22 Feb 2022 09:27)  POCT Blood Glucose.: 170 mg/dL (22 Feb 2022 08:00)  POCT Blood Glucose.: 213 mg/dL (21 Feb 2022 21:24)  POCT Blood Glucose.: 102 mg/dL (21 Feb 2022 16:26)

## 2022-02-22 NOTE — RAPID RESPONSE TEAM SUMMARY - NSSITUATIONBACKGROUNDRRT_GEN_ALL_CORE
Situation:   Patient is a 61y Female admitted for DKA . RRT called because of tingling sensation in RUE/RLE. Per RN, pt stated she has been having tingling sensation in right arm and leg since last night, but has progressively become prominent as of this morning.  RN made hospitalist aware. Neurology was made aware given, sudden change in sensation, although pt is out of window period would like to R/O TIA.  Stroke Protocol initiated.     Vitals review:  T(C): 36.7 (02-22-22 @ 09:04), Max: 36.9 (02-21-22 @ 15:51)  HR: 90 (02-22-22 @ 09:04) (80 - 90)  BP: 147/90 (02-22-22 @ 09:04) (130/81 - 147/90)  RR: 17 (02-22-22 @ 09:04) (17 - 18)  SpO2: 96% (02-22-22 @ 09:04) (96% - 98%)    Background:  HPI:  62 y/o F with a h/o HTN, asthma, VINICIUS, remote thyroidectomy, presents to the ED with a few days of progressive AMS, n/v, abdominal pain, constipation, polydipsia, and polyuria. She reports that she has been drinking a ton of orange juice, tea, and water recently but is unable to quench her thirst. BG noted to be elevated to 1302. AG of 30. She is very lethargic and her mentation is far from her baseline, as per her  at bedside.  Aggressively hydrated and started on an insulin infusion. (18 Feb 2022 01:03)      Patient was seen and examined by Rapid response team Situation:   Patient is a 61y Female admitted for DKA . RRT called because of tingling sensation in RUE/RLE. Per RN, pt stated she has been having tingling sensation in right arm and leg since last night (approx 10 PM), but has progressively become prominent as of this morning.  RN made hospitalist aware. Neurology was made aware given, sudden change in sensation, although pt is out of window period would like to R/O TIA.  Stroke Protocol initiated.     Vitals review:  T(C): 36.7 (02-22-22 @ 09:04), Max: 36.9 (02-21-22 @ 15:51)  HR: 90 (02-22-22 @ 09:04) (80 - 90)  BP: 147/90 (02-22-22 @ 09:04) (130/81 - 147/90)  RR: 17 (02-22-22 @ 09:04) (17 - 18)  SpO2: 96% (02-22-22 @ 09:04) (96% - 98%)    Background:  HPI:  62 y/o F with a h/o HTN, asthma, VINICIUS, remote thyroidectomy, presents to the ED with a few days of progressive AMS, n/v, abdominal pain, constipation, polydipsia, and polyuria. She reports that she has been drinking a ton of orange juice, tea, and water recently but is unable to quench her thirst. BG noted to be elevated to 1302. AG of 30. She is very lethargic and her mentation is far from her baseline, as per her  at bedside.  Aggressively hydrated and started on an insulin infusion. (18 Feb 2022 01:03)      Patient was seen and examined by Rapid response team

## 2022-02-22 NOTE — CONSULT NOTE ADULT - SUBJECTIVE AND OBJECTIVE BOX
61yF was admitted on 02-18    In ED, GCS=    Patient is a 61y old  Female who presents with a chief complaint of DKA (21 Feb 2022 15:44)    HPI:  60 y/o F with a h/o HTN, asthma, VINICIUS, remote thyroidectomy, presents to the ED with a few days of progressive AMS, n/v, abdominal pain, constipation, polydipsia, and polyuria. She reports that she has been drinking a ton of orange juice, tea, and water recently but is unable to quench her thirst. BG noted to be elevated to 1302. AG of 30. She is very lethargic and her mentation is far from her baseline, as per her  at bedside.  Aggressively hydrated and started on an insulin infusion. RRT called on 2/22 at 9:30  because of tingling sensation in RUE/RLE. Per RN, pt stated she has been having tingling sensation in right arm and leg since last night.  (approx 10 PM)(18 Feb 2022 01:03)    Imaging performed:    CT Brain Stroke Protocol (02.22.22) -  Mild chronic microvascular changes without evidence of an   acute transcortical infarction or hemorrhage. Findings discussed with Dr. Connell.    CTA BRAIN (02.22.22) : Mild right cavernous carotid artery calcification. The [place] and vertebrobasilar system are unremarkable without  evidence of stenosis, occlusion or saccular aneurysm dilation. No  evidence for arterial venous malformation. The vertebral arteries are  codominant.    CTA NECK  (02.22.22): A left-sided aortic arch is demonstrated. There is normal relationship to  the great vessels. The common carotid arteries, internal carotid arteries and vertebral arteries are normal in caliber. No evidence of stenosis, occlusion or saccular aneurysm dilation. The vertebral arteries are codominant.    CT PERFUSION (02.22.22): Patient has only had less than 2 hours of symptoms may influence the CT  perfusion.    -------------------------------------------------------------------------------    REVIEW OF SYSTEMS  Constitutional - No fever, No weight loss, No fatigue  HEENT - No eye pain, No visual disturbances, No difficulty hearing, No tinnitus, No vertigo, No neck pain  Respiratory - No cough, No wheezing, No shortness of breath  Cardiovascular - No chest pain, No palpitations  Gastrointestinal - No abdominal pain, No nausea, No vomiting, No diarrhea, No constipation  Genitourinary - No dysuria, No frequency, No hematuria, No incontinence  Neurological - No headaches, No memory loss, No loss of strength, No numbness, No tremors  Skin - No itching, No rashes, No lesions   Endocrine - No temperature intolerance  Musculoskeletal - No joint pain, No joint swelling, No muscle pain  Psychiatric - No depression, No anxiety    VITALS  T(C): 36.8 (02-22-22 @ 10:44), Max: 36.9 (02-21-22 @ 15:51)  HR: 89 (02-22-22 @ 10:44) (80 - 90)  BP: 141/84 (02-22-22 @ 10:44) (130/81 - 147/90)  RR: 18 (02-22-22 @ 10:44) (17 - 18)  SpO2: 99% (02-22-22 @ 10:44) (96% - 99%)  Wt(kg): --    PAST MEDICAL & SURGICAL HISTORY  Asthma    Hypertension    Sleep apnea    S/P hysterectomy    S/P cholecystectomy    S/P thyroidectomy    SOCIAL HISTORY - as per documentation/history  Smoking - None  EtOH - None  Drugs - None    FUNCTIONAL HISTORY      CURRENT FUNCTIONAL STATUS    Pending PT & OT consults.     FAMILY HISTORY   No pertinent family history in first degree relatives    RECENT LABS - Reviewed    02-22    139  |  102  |  13.4  ----------------------------<  174<H>  3.6   |  25.0  |  0.74    Ca    8.8      22 Feb 2022 07:23    ALLERGIES  No Known Allergies    MEDICATIONS   amLODIPine   Tablet 10 milliGRAM(s) Oral daily  aspirin  chewable 81 milliGRAM(s) Oral daily  atorvastatin 40 milliGRAM(s) Oral at bedtime  carvedilol 6.25 milliGRAM(s) Oral every 12 hours  chlorhexidine 4% Liquid 1 Application(s) Topical <User Schedule>  dextrose 40% Gel 15 Gram(s) Oral once  dextrose 5%. 1000 milliLiter(s) IV Continuous <Continuous>  dextrose 5%. 1000 milliLiter(s) IV Continuous <Continuous>  dextrose 50% Injectable 25 Gram(s) IV Push once  dextrose 50% Injectable 12.5 Gram(s) IV Push once  dextrose 50% Injectable 25 Gram(s) IV Push once  famotidine Injectable 20 milliGRAM(s) IV Push every 12 hours  glucagon  Injectable 1 milliGRAM(s) IntraMuscular once  heparin   Injectable 5000 Unit(s) SubCutaneous every 8 hours  insulin glargine Injectable (LANTUS) 50 Unit(s) SubCutaneous at bedtime  insulin glargine Injectable (LANTUS) 50 Unit(s) SubCutaneous every morning  insulin lispro (ADMELOG) corrective regimen sliding scale   SubCutaneous Before meals and at bedtime  insulin lispro Injectable (ADMELOG) 16 Unit(s) SubCutaneous three times a day with meals  sodium chloride 0.45%. 1000 milliLiter(s) IV Continuous <Continuous>      ----------------------------------------------------------------------------------------  PHYSICAL EXAM  Constitutional - NAD, Comfortable  HEENT - NCAT, EOMI  Chest - Breathing comfortably, No wheezing  Cardiovascular - S1S2   Abdomen - Soft   Extremities - No C/C/E, No calf tenderness   Neurologic Exam -                    Cognitive - AAO to self, place, date, year, situation     Communication - Fluent, No dysarthria     Cranial Nerves - CN 2-12 intact     Motor - No focal deficits                    LEFT    UE - ShAB 5/5, EF 5/5, EE 5/5, WE 5/5,  5/5                    RIGHT UE - ShAB 5/5, EF 5/5, EE 5/5, WE 5/5,  5/5                    LEFT    LE - HF 5/5, KE 5/5, DF 5/5, PF 5/5                    RIGHT LE - HF 5/5, KE 5/5, DF 5/5, PF 5/5        Sensory - Intact to LT     OculoVestibular - No nystagmus  Psychiatric - Mood stable, Affect WNL  ----------------------------------------------------------------------------------------  ASSESSMENT/PLAN  61y Female with admitted after new onset DM. RRT called due to Right sided paranesthesia to r/o CVA.      TIA ? - ASA. CTA,CTP with no acute infarct. No focal deficits. Pending MRI.   HLD - Lipitor   HTN - Norvasc, Carvedilol  GERD - Famotidine   DM - Lantus, Admelog & ISS   Diet - NPO.   Pain - Tylenol  DVT PPX - SCDs, Heparin     Rehab -   Patient with no new focal deficits. Pending PT & OT & Speech Consults. Will continue to follow to see if she has any acute finding on the MRI & depending on that if she will need any rehab in the future.     Will continue to follow. Functional progress will determine ongoing rehab dispo recommendations, which may change.    Continue bedside therapy as well as OOB throughout the day with mobilization by staff to maintain cardiopulmonary function and prevention of secondary complications related to debility.     Discussed with rehab team.  61yF was admitted on 02-18    In ED, GCS=    Patient is a 61y old  Female who presents with a chief complaint of DKA (21 Feb 2022 15:44)    HPI:  62 y/o F with a h/o HTN, asthma, VINICIUS, remote thyroidectomy, presents to the ED with a few days of progressive AMS, n/v, abdominal pain, constipation, polydipsia, and polyuria. She reports that she has been drinking a ton of orange juice, tea, and water recently but is unable to quench her thirst. BG noted to be elevated to 1302. AG of 30. She is very lethargic and her mentation is far from her baseline, as per her  at bedside.  Aggressively hydrated and started on an insulin infusion. RRT called on 2/22 at 9:30  because of tingling sensation in RUE/RLE. Per RN, pt stated she has been having tingling sensation in right arm and leg since last night.  (approx 10 PM)(18 Feb 2022 01:03)    Imaging performed:    CT Brain Stroke Protocol (02.22.22) -  Mild chronic microvascular changes without evidence of an   acute transcortical infarction or hemorrhage. Findings discussed with Dr. Connell.    CTA BRAIN (02.22.22) : Mild right cavernous carotid artery calcification. The [place] and vertebrobasilar system are unremarkable without  evidence of stenosis, occlusion or saccular aneurysm dilation. No  evidence for arterial venous malformation. The vertebral arteries are  codominant.    CTA NECK  (02.22.22): A left-sided aortic arch is demonstrated. There is normal relationship to  the great vessels. The common carotid arteries, internal carotid arteries and vertebral arteries are normal in caliber. No evidence of stenosis, occlusion or saccular aneurysm dilation. The vertebral arteries are codominant.    CT PERFUSION (02.22.22): Patient has only had less than 2 hours of symptoms may influence the CT  perfusion.    -------------------------------------------------------------------------------    REVIEW OF SYSTEMS  Constitutional - No fever, No weight loss, No fatigue  HEENT - No eye pain, No visual disturbances, No difficulty hearing, No tinnitus, No vertigo, No neck pain  Respiratory - No cough, No wheezing, No shortness of breath  Cardiovascular - No chest pain, No palpitations  Gastrointestinal - No abdominal pain, No nausea, No vomiting, No diarrhea, No constipation  Genitourinary - No dysuria, No frequency, No hematuria, No incontinence  Neurological - No headaches, No memory loss, No loss of strength, No numbness, No tremors  Skin - No itching, No rashes, No lesions   Endocrine - No temperature intolerance  Musculoskeletal - No joint pain, No joint swelling, No muscle pain  Psychiatric - No depression, No anxiety    VITALS  T(C): 36.8 (02-22-22 @ 10:44), Max: 36.9 (02-21-22 @ 15:51)  HR: 89 (02-22-22 @ 10:44) (80 - 90)  BP: 141/84 (02-22-22 @ 10:44) (130/81 - 147/90)  RR: 18 (02-22-22 @ 10:44) (17 - 18)  SpO2: 99% (02-22-22 @ 10:44) (96% - 99%)  Wt(kg): --    PAST MEDICAL & SURGICAL HISTORY  Asthma    Hypertension    Sleep apnea    S/P hysterectomy    S/P cholecystectomy    S/P thyroidectomy    SOCIAL HISTORY - as per documentation/history  Smoking - None  EtOH - None  Drugs - None    FUNCTIONAL HISTORY      CURRENT FUNCTIONAL STATUS    Pending PT & OT consults.     FAMILY HISTORY   No pertinent family history in first degree relatives    RECENT LABS - Reviewed    02-22    139  |  102  |  13.4  ----------------------------<  174<H>  3.6   |  25.0  |  0.74    Ca    8.8      22 Feb 2022 07:23    ALLERGIES  No Known Allergies    MEDICATIONS   amLODIPine   Tablet 10 milliGRAM(s) Oral daily  aspirin  chewable 81 milliGRAM(s) Oral daily  atorvastatin 40 milliGRAM(s) Oral at bedtime  carvedilol 6.25 milliGRAM(s) Oral every 12 hours  chlorhexidine 4% Liquid 1 Application(s) Topical <User Schedule>  dextrose 40% Gel 15 Gram(s) Oral once  dextrose 5%. 1000 milliLiter(s) IV Continuous <Continuous>  dextrose 5%. 1000 milliLiter(s) IV Continuous <Continuous>  dextrose 50% Injectable 25 Gram(s) IV Push once  dextrose 50% Injectable 12.5 Gram(s) IV Push once  dextrose 50% Injectable 25 Gram(s) IV Push once  famotidine Injectable 20 milliGRAM(s) IV Push every 12 hours  glucagon  Injectable 1 milliGRAM(s) IntraMuscular once  heparin   Injectable 5000 Unit(s) SubCutaneous every 8 hours  insulin glargine Injectable (LANTUS) 50 Unit(s) SubCutaneous at bedtime  insulin glargine Injectable (LANTUS) 50 Unit(s) SubCutaneous every morning  insulin lispro (ADMELOG) corrective regimen sliding scale   SubCutaneous Before meals and at bedtime  insulin lispro Injectable (ADMELOG) 16 Unit(s) SubCutaneous three times a day with meals  sodium chloride 0.45%. 1000 milliLiter(s) IV Continuous <Continuous>      ----------------------------------------------------------------------------------------  PHYSICAL EXAM  Constitutional - NAD, Comfortable  HEENT - NCAT, EOMI  Chest - Breathing comfortably, No wheezing  Cardiovascular - S1S2   Abdomen - Soft   Extremities - No C/C/E, No calf tenderness   Neurologic Exam -                    Cognitive - AAO to self, place, date, year, situation     Communication - Fluent, No dysarthria     Cranial Nerves - CN 2-12 intact     Motor - No focal deficits                    LEFT    UE - ShAB 5/5, EF 5/5, EE 5/5, WE 5/5,  5/5                    RIGHT UE - ShAB 5/5, EF 5/5, EE 5/5, WE 5/5,  5/5                    LEFT    LE - HF 5/5, KE 5/5, DF 5/5, PF 5/5                    RIGHT LE - HF 5/5, KE 5/5, DF 5/5, PF 5/5        Sensory - Intact to LT     OculoVestibular - No nystagmus  Psychiatric - Mood stable, Affect WNL  ----------------------------------------------------------------------------------------  ASSESSMENT/PLAN  61y Female with admitted after new onset DM. RRT called due to Right sided paranesthesia to r/o CVA.      CVA? - ASA. CTA/CTP with no acute infarct. No focal deficits. Pending MRI.   HLD - Lipitor   HTN - Norvasc, Carvedilol  GERD - Famotidine   DM - Lantus, Admelog & ISS   Diet - NPO.   Pain - Tylenol  DVT PPX - SCDs, Heparin     Rehab -   Patient with no new focal deficits. Pending PT & OT & Speech Consults. Will continue to follow to see if she has any acute finding on the MRI & depending on that if she will need any rehab in the future.     Will continue to follow. Functional progress will determine ongoing rehab dispo recommendations, which may change.    Continue bedside therapy as well as OOB throughout the day with mobilization by staff to maintain cardiopulmonary function and prevention of secondary complications related to debility.     Discussed with rehab team.  61yF was admitted on 02-18    Patient is a 61y old  Female who presents with a chief complaint of DKA (21 Feb 2022 15:44)    HPI:  Ms. Flores is a 61 year old Female with a h/o HTN, asthma, VINICIUS, remote thyroidectomy, presents to the ED with a few days of progressive AMS, n/v, abdominal pain, constipation, polydipsia, and polyuria.  BG noted to be elevated to 1302. AG of 30.  She was treated for DKA, aggressively hydrated and started on an insulin infusion. RRT called on 2/22 at 9:30  because of tingling sensation in RUE/RLE.  Neurology consulted and CT head obtained.  She reports the numbness has improved.        Imaging performed:    CT Brain Stroke Protocol (02.22.22) -  Mild chronic microvascular changes without evidence of an   acute transcortical infarction or hemorrhage. Findings discussed with Dr. Connell.    CTA BRAIN (02.22.22) : Mild right cavernous carotid artery calcification. The [place] and vertebrobasilar system are unremarkable without  evidence of stenosis, occlusion or saccular aneurysm dilation. No  evidence for arterial venous malformation. The vertebral arteries are  codominant.    CTA NECK  (02.22.22): A left-sided aortic arch is demonstrated. There is normal relationship to  the great vessels. The common carotid arteries, internal carotid arteries and vertebral arteries are normal in caliber. No evidence of stenosis, occlusion or saccular aneurysm dilation. The vertebral arteries are codominant.    CT PERFUSION (02.22.22): Patient has only had less than 2 hours of symptoms may influence the CT  perfusion.    -------------------------------------------------------------------------------    REVIEW OF SYSTEMS  Constitutional - No fever, No weight loss, No fatigue  HEENT - No eye pain, No visual disturbances, No difficulty hearing, No tinnitus, No vertigo, No neck pain  Respiratory - No cough, No wheezing, No shortness of breath  Cardiovascular - No chest pain, No palpitations  Gastrointestinal - No abdominal pain, No nausea, No vomiting, No diarrhea, No constipation  Genitourinary - No dysuria, No frequency, No hematuria, No incontinence  Neurological - No headaches, No memory loss, No loss of strength, No numbness, No tremors  Skin - No itching, No rashes, No lesions   Endocrine - No temperature intolerance  Musculoskeletal - No joint pain, No joint swelling, No muscle pain  Psychiatric - No depression, No anxiety    VITALS  T(C): 36.8 (02-22-22 @ 10:44), Max: 36.9 (02-21-22 @ 15:51)  HR: 89 (02-22-22 @ 10:44) (80 - 90)  BP: 141/84 (02-22-22 @ 10:44) (130/81 - 147/90)  RR: 18 (02-22-22 @ 10:44) (17 - 18)  SpO2: 99% (02-22-22 @ 10:44) (96% - 99%)  Wt(kg): --    PAST MEDICAL & SURGICAL HISTORY  Asthma    Hypertension    Sleep apnea    S/P hysterectomy    S/P cholecystectomy    S/P thyroidectomy    SOCIAL HISTORY - as per documentation/history  Smoking - None  EtOH - None  Drugs - None    FUNCTIONAL HISTORY  Previously independent.      CURRENT FUNCTIONAL STATUS    Pending PT & OT consults.     FAMILY HISTORY   No pertinent family history in first degree relatives    RECENT LABS - Reviewed    02-22    139  |  102  |  13.4  ----------------------------<  174<H>  3.6   |  25.0  |  0.74    Ca    8.8      22 Feb 2022 07:23    ALLERGIES  No Known Allergies    MEDICATIONS   amLODIPine   Tablet 10 milliGRAM(s) Oral daily  aspirin  chewable 81 milliGRAM(s) Oral daily  atorvastatin 40 milliGRAM(s) Oral at bedtime  carvedilol 6.25 milliGRAM(s) Oral every 12 hours  chlorhexidine 4% Liquid 1 Application(s) Topical <User Schedule>  dextrose 40% Gel 15 Gram(s) Oral once  dextrose 5%. 1000 milliLiter(s) IV Continuous <Continuous>  dextrose 5%. 1000 milliLiter(s) IV Continuous <Continuous>  dextrose 50% Injectable 25 Gram(s) IV Push once  dextrose 50% Injectable 12.5 Gram(s) IV Push once  dextrose 50% Injectable 25 Gram(s) IV Push once  famotidine Injectable 20 milliGRAM(s) IV Push every 12 hours  glucagon  Injectable 1 milliGRAM(s) IntraMuscular once  heparin   Injectable 5000 Unit(s) SubCutaneous every 8 hours  insulin glargine Injectable (LANTUS) 50 Unit(s) SubCutaneous at bedtime  insulin glargine Injectable (LANTUS) 50 Unit(s) SubCutaneous every morning  insulin lispro (ADMELOG) corrective regimen sliding scale   SubCutaneous Before meals and at bedtime  insulin lispro Injectable (ADMELOG) 16 Unit(s) SubCutaneous three times a day with meals  sodium chloride 0.45%. 1000 milliLiter(s) IV Continuous <Continuous>      ----------------------------------------------------------------------------------------  PHYSICAL EXAM  Constitutional - NAD, Comfortable  HEENT - NCAT, EOMI  Chest - Breathing comfortably, No wheezing  Cardiovascular - No cyanosis   Abdomen - Soft   Extremities - No C/C/E, No calf tenderness   Neurologic Exam -                    Cognitive - AAO to self, place, date, year, situation     Communication - Fluent, No dysarthria     Cranial Nerves - CN 2-12 intact     Motor - No focal deficits                    LEFT    UE - ShAB 5/5, EF 5/5, EE 5/5, WE 5/5,  5/5                    RIGHT UE - ShAB 5/5, EF 5/5, EE 5/5, WE 5/5,  5/5                    LEFT    LE - HF 5/5, KE 5/5, DF 5/5, PF 5/5                    RIGHT LE - HF 5/5, KE 5/5, DF 5/5, PF 5/5        Sensory - Intact to LT     OculoVestibular - No nystagmus  Psychiatric - Mood stable, Affect WNL  ----------------------------------------------------------------------------------------  ASSESSMENT/PLAN  61y Female admitted after new onset DM. RRT called due to Right sided parasthesia, concern for CVA.      CVA? - ASA. CTA/CTP with no acute infarct. No focal deficits. Pending MRI.  Neurology consulted.    HLD - Lipitor   HTN - Norvasc, Carvedilol  GERD - Famotidine   DM - Lantus, Admelog & ISS   Pain - Tylenol  DVT PPX - SCDs, Heparin   Rehab -Pending PT & OT & Speech Consults. Will continue to follow to see if she has any acute finding on the MRI, expect patient to achieve functional status for discharge to home, pending out of bed evaluation.      Will continue to follow. Functional progress will determine ongoing rehab dispo recommendations, which may change.    Continue bedside therapy as well as OOB throughout the day with mobilization by staff to maintain cardiopulmonary function and prevention of secondary complications related to debility.

## 2022-02-22 NOTE — PROGRESS NOTE ADULT - SUBJECTIVE AND OBJECTIVE BOX
Interval Events:  pt c/o right sided tingling overnight but did not mention to the RN until this AM, rapid response called. Worked up for code stroke.  follow up on T2DM/partial thyroidectomy (normal TSH)    patient seen and examined at bedside.    REVIEW OF SYSTEMS:    CONSTITUTIONAL: No fever, weight loss, or fatigue  EYES: No eye pain, visual disturbances, or discharge  ENMT:  No difficulty hearing, tinnitus, vertigo; No sinus or throat pain  NECK: No pain or stiffness  RESPIRATORY: No cough, wheezing, chills or hemoptysis; No shortness of breath  CARDIOVASCULAR: No chest pain, palpitations, dizziness, or leg swelling  GASTROINTESTINAL: No abdominal or epigastric pain. No nausea, vomiting, or hematemesis; No diarrhea or constipation. No melena or hematochezia.  NEUROLOGICAL: No headaches, memory loss, loss of strength, numbness, or tremors. Right sided body tingling.   SKIN: No itching, burning, rashes, or lesions   MUSCULOSKELETAL: No joint pain or swelling; No muscle, back, or extremity pain  PSYCHIATRIC: No depression, anxiety, mood swings, or difficulty sleeping      No Known Allergies      MEDICATIONS  (STANDING):  amLODIPine   Tablet 10 milliGRAM(s) Oral daily  aspirin  chewable 81 milliGRAM(s) Oral daily  atorvastatin 40 milliGRAM(s) Oral at bedtime  carvedilol 6.25 milliGRAM(s) Oral every 12 hours  chlorhexidine 4% Liquid 1 Application(s) Topical <User Schedule>  dextrose 40% Gel 15 Gram(s) Oral once  dextrose 5%. 1000 milliLiter(s) (50 mL/Hr) IV Continuous <Continuous>  dextrose 5%. 1000 milliLiter(s) (100 mL/Hr) IV Continuous <Continuous>  dextrose 50% Injectable 25 Gram(s) IV Push once  dextrose 50% Injectable 12.5 Gram(s) IV Push once  dextrose 50% Injectable 25 Gram(s) IV Push once  famotidine Injectable 20 milliGRAM(s) IV Push every 12 hours  glucagon  Injectable 1 milliGRAM(s) IntraMuscular once  heparin   Injectable 5000 Unit(s) SubCutaneous every 8 hours  insulin glargine Injectable (LANTUS) 50 Unit(s) SubCutaneous every morning  insulin glargine Injectable (LANTUS) 50 Unit(s) SubCutaneous at bedtime  insulin lispro (ADMELOG) corrective regimen sliding scale   SubCutaneous Before meals and at bedtime  insulin lispro Injectable (ADMELOG) 16 Unit(s) SubCutaneous three times a day with meals  sodium chloride 0.45%. 1000 milliLiter(s) (75 mL/Hr) IV Continuous <Continuous>    MEDICATIONS  (PRN):      Vital Signs Last 24 Hrs  T(C): 36.7 (22 Feb 2022 12:00), Max: 36.9 (21 Feb 2022 15:51)  T(F): 98 (22 Feb 2022 12:00), Max: 98.5 (21 Feb 2022 15:51)  HR: 80 (22 Feb 2022 12:00) (80 - 90)  BP: 131/76 (22 Feb 2022 12:00) (130/81 - 147/90)  BP(mean): 87 (22 Feb 2022 12:00) (87 - 97)  RR: 18 (22 Feb 2022 12:00) (17 - 18)  SpO2: 99% (22 Feb 2022 12:00) (96% - 99%)  Weight (kg): 103.7 (02-18-22 @ 03:33)    Physical Exam:    Constitutional: NAD, well-developed  HEENT: EOMI, no exophalmos  Neck: trachea midline, no thyroid enlargement  Respiratory: CTAB, normal respirations  Cardiovascular: S1 and S2, RRR  Gastrointestinal: BS+, soft, ntnd  Extremities: No peripheral edema  Neurological: AOx3, no focal deficits. Right sided tingling, slight weakness in right hand strength.   Psychiatric: Normal mood and normal affect  Skin: no rashes, no acanthosis    LABS  02-22    139  |  102  |  13.4  ----------------------------<  174<H>  3.6   |  25.0  |  0.74    Ca    8.8      22 Feb 2022 07:23          CT Abdomen and Pelvis No Cont:   ACC: [id number] EXAM:  CT ABDOMEN AND PELVIS                        ACC: [id number] EXAM:  CT CHEST                          PROCEDURE DATE:  02/18/2022          INTERPRETATION:  CLINICAL INFORMATION: patient brought in by daughter   states that she "is not feeling well" complaining of abdominal pain N/V   not eating    COMPARISON: Chest CT 8/2/2016    CONTRAST/COMPLICATIONS:  IV Contrast: NONE  Oral Contrast: NONE  Complications: None reported at time of study completion    PROCEDURE:  CT of the Chest, abdomen and pelvis were performed.  Sagittal and coronal reformats were performed.    FINDINGS:    CHEST:  Suboptimal study due to patient positioning with arms at the side which   causes streak artifact. Limited evaluation of the vascular structures and   soft tissues without IV contrast.    LUNGS AND AIRWAYS: Patent central airways.  Lungs are clear.  PLEURA: No pleural effusion. No pneumothorax.  MEDIASTINUM AND PRESTON: No lymphadenopathy.  VESSELS: Within normal limits.  HEART: Heart size is normal. No pericardial effusion.  CHEST WALL AND LOWER NECK: Visualized proximal right upper extremity   demonstrates nonspecific subcutaneous edema with fat stranding and   subfascial fluid with foci of air.  VISUALIZED UPPER ABDOMEN: Within normal limits.  BONES: Within normal limits.    ABDOMEN/PELVIS:  LIVER: Within normal limits.  BILE DUCTS: Normal caliber.  GALLBLADDER: Cholecystectomy.  SPLEEN: Within normal limits.  PANCREAS: Within normal limits.  ADRENALS: Within normal limits.  KIDNEYS/URETERS: No hydronephrosis or perinephric stranding.    BLADDER: Within normal limits.  REPRODUCTIVE ORGANS: Hysterectomy.    BOWEL: No bowel obstruction. Appendix is normal. Descending and sigmoid   colon diverticulosis. No evidence of diverticulitis. Small hiatal hernia.  PERITONEUM: No ascites.  VESSELS: Within normal limits.  RETROPERITONEUM/LYMPH NODES: No lymphadenopathy.  ABDOMINAL WALL: Within normal limits.  BONES: Within normal limits.    IMPRESSION:  No acute finding in the chest, abdomen or pelvis to explain patient's   symptomatology.    Subcutaneous edema and subfascial fluid with foci of subcutaneous air in   the visualized proximal right upper extremity. Please correlate   clinically for recent intervention versus cellulitis.    --- End of Report ---            NAMAN BROUSSARD MD; Attending Radiologist  This document has been electronically signed. Feb 18 2022  6:00AM (02-18-22 @ 03:09)  Thyroid Stimulating Hormone, Serum: 0.86 uIU/mL (02-17-22 @ 23:35)  A1C with Estimated Average Glucose Result: 14.7 % (02-17-22 @ 23:35)            CAPILLARY BLOOD GLUCOSE      POCT Blood Glucose.: 182 mg/dL (22 Feb 2022 12:22)  POCT Blood Glucose.: 310 mg/dL (22 Feb 2022 09:27)  POCT Blood Glucose.: 170 mg/dL (22 Feb 2022 08:00)  POCT Blood Glucose.: 213 mg/dL (21 Feb 2022 21:24)  POCT Blood Glucose.: 102 mg/dL (21 Feb 2022 16:26)

## 2022-02-22 NOTE — PROGRESS NOTE ADULT - ASSESSMENT
60 y/o F with a h/o HTN, asthma, VINICIUS, remote thyroidectomy admit with severe DKA, newly Dx T2DM, hypernatremia and ANTONINA    1. New onset T2DM - A1c 14.7%, s/p DKA, blood sugars improving  - Lantus decreased to 50 units BID  - Continue Admelog 16 units TID with meals and SSI  - Cpeptide/QI ordered to rule out T1DM  - Will f/u with our office outpatient  - Continue insulin/diabetic education    Endocrine follow up appointment:  [facility] at [place] [address] [address] [facility] [address] [facility], 2nd floor  March 15th at 8:00am  Provider: OMER Barrow    2. S/p partial thyroidectomy  - Normal TSH, no thyroid hormone needed    3. New right sided tingling  - CTP negative  - Pending MRI of brain

## 2022-02-22 NOTE — RAPID RESPONSE TEAM SUMMARY - NSADDTLFINDINGSRRT_GEN_ALL_CORE
VS:  Blood sugar:310  PHYSICAL EXAM:  GENERAL: Pt lying in bed comfortably in NAD  HEAD:  Atraumatic   EYES: EOMI, PERRL, conjunctiva and sclera clear  ENT: Moist mucous membranes  NECK: Supple, No JVD  CHEST/LUNG: Clear to auscultation bilaterally; No rales, rhonchi or wheezing. Unlabored respirations  HEART: S1, S2, Regular rate and rhythm   ABDOMEN: Bowel sounds present; Soft, Nontender, Nondistended. No guarding or rigidity    EXTREMITIES:  2+ Peripheral Pulses, brisk capillary refill. No clubbing, cyanosis, or edema  NERVOUS SYSTEM:  Alert & Oriented X3, speech clear. Answers questions appropriately. Full strength with Hand  bilaterally,  MSK: FROM x 4 extremities   SKIN: No rashes or lesions       VS:  Blood sugar:310  PHYSICAL EXAM:  GENERAL: Pt lying in bed comfortably in NAD  HEAD:  Atraumatic   EYES: EOMI, PERRL, conjunctiva and sclera clear  ENT: Moist mucous membranes  NECK: Supple, No JVD  CHEST/LUNG: Clear to auscultation bilaterally; No rales, rhonchi or wheezing. Unlabored respirations  HEART: S1, S2, Regular rate and rhythm   ABDOMEN: Bowel sounds present; Soft, Nontender, Nondistended. No guarding or rigidity    EXTREMITIES:  2+ Peripheral Pulses, brisk capillary refill. No clubbing, cyanosis, or edema  NERVOUS SYSTEM:  Alert & Oriented X3, speech clear. Answers questions appropriately. Full strength with Hand  bilaterally,  MSK: FROM x 4 extremities   SKIN: No rashes or lesions    [facility] SS:   Date: 2/21/22  Time: 1430  1a) Level of consciousness (0-3): 0  1b) Questions (0-2): 2  1c) Commands (0-2): 0  2  ) Gaze (0-2): 0  3  ) Visual field (0-3): 0  4  ) Facial palsy (0-3): 0  Motor  5a) Left arm (0-4): 0  5b) Right arm (0-4): 0  6a) Left leg (0-4): 0  6b) Right leg (0-4): 0  7  ) Ataxia (0-2): 0  Sensory  8  ) Sensory (0-2): 0  Speech  9  ) Language (0-3): 0  10) Dysarthria (0-2): 1  Extinction  11) Extinction/inattention (0-2):       VS:  Blood sugar:310  PHYSICAL EXAM:  GENERAL: Pt lying in bed comfortably in NAD  HEAD:  Atraumatic   EYES: EOMI, PERRL, conjunctiva and sclera clear  ENT: Moist mucous membranes  NECK: Supple, No JVD  CHEST/LUNG: Clear to auscultation bilaterally; No rales, rhonchi or wheezing. Unlabored respirations  HEART: S1, S2, Regular rate and rhythm   ABDOMEN: Bowel sounds present; Soft, Nontender, Nondistended. No guarding or rigidity    EXTREMITIES:  2+ Peripheral Pulses, brisk capillary refill. No clubbing, cyanosis, or edema  NERVOUS SYSTEM:  Alert & Oriented X3, speech clear. Answers questions appropriately. Full strength with Hand  bilaterally, slight drift noted in RLE   MSK: FROM x 4 extremities   SKIN: No rashes or lesions    [facility] SS:   Date: 2/21/22  Time: 1430  1a) Level of consciousness (0-3): 0  1b) Questions (0-2): 0  1c) Commands (0-2): 0  2  ) Gaze (0-2): 0  3  ) Visual field (0-3): 0  4  ) Facial palsy (0-3): 0  Motor  5a) Left arm (0-4): 0  5b) Right arm (0-4): 0  6a) Left leg (0-4): 0  6b) Right leg (0-4): 1  7  ) Ataxia (0-2): 0  Sensory  8  ) Sensory (0-2): 1  Speech  9  ) Language (0-3): 0  10) Dysarthria (0-2): 0  Extinction  11) Extinction/inattention (0-2): 0

## 2022-02-22 NOTE — PROVIDER CONTACT NOTE (CHANGE IN STATUS NOTIFICATION) - ASSESSMENT
pt states her right side is "tingling" Started last night but didn't think anything of it to tell the night nurse but is still there this morning  VS stable

## 2022-02-22 NOTE — CHART NOTE - NSCHARTNOTEFT_GEN_A_CORE
----- Message from Leonidas Allen MD sent at 11/6/2021  2:21 PM EDT -----  Thyroid testing is within normal limits.    CMP is also within normal limits.    Lipids show mildly elevated LDL cholesterol.  This isn't elevated to such an extent that we need to start medicines.  I would recommend a heart healthy diet.    CBC is unremarkable.       2Hour RRT Follow-Up:    -2 hour follow up completed at 12:30.  Patient is noted to be in NAD, VSS, resting comfortably in bed without complaints.  Patient adamantly denies: chest pain, palpitations, SOB, difficulty breathing, lightheadedness, dizziness, weakness, headaches, or any abnormal changes in speech / sensation / vision.  Patient states she is feeling well and asks if she can go home later today.  Advised patient that because of the RRT called to r/o stroke given sensory changes earlier, we need to complete the work-up to ensure that there is no other acute process going on.  Patient verbalized understanding and agrees to continue with all recommended treatment as directed.     Vital Signs Last 24 Hrs  T(C): 36.8 (22 Feb 2022 10:44), Max: 36.9 (21 Feb 2022 15:51)  T(F): 98.2 (22 Feb 2022 10:44), Max: 98.5 (21 Feb 2022 15:51)  HR: 89 (22 Feb 2022 10:44) (80 - 90)  BP: 141/84 (22 Feb 2022 10:44) (130/81 - 147/90)  BP(mean): 97 (22 Feb 2022 10:44) (97 - 97)  RR: 18 (22 Feb 2022 10:44) (17 - 18)  SpO2: 99% (22 Feb 2022 10:44) (96% - 99%)    < from: CT Angio Head & Neck w/ IV Cont and CTP (02.22.22 @ 10:25) >  IMPRESSION: Negative CTP. No intracranial large vessel occlusion. Consider MRI of the   brain as clinically warranted.    < from: CT Brain Stroke Protocol (02.22.22 @ 10:07) >  IMPRESSION:  Mild chronic microvascular changes without evidence of an   acute transcortical infarction or hemorrhage. Findings discussed with Dr. Connell at 10:09 AM.    Physical Exam:  General: NAD, NCAT, resting comfortably in bed  Cardio: normal S1, S2, no MRG appreciated  Lungs: CTAB, no abnormal breath sounds appreciated, respirations unlabored on RA  Abdomen: soft, non-tender, non-distended, +BS  Extremities: no pedal edema appreciated  Neuro: AOX4, sensation and strength intact distally, CURTIS AG, Strength 5/5 throughout, speech is clear and fluent, no facial droop appreciated, no pronator drift appreciated, PERRL, EOMI, follows commands, no focal deficits appreciated     A/P:  -MRI Brain non-contrast pending   -continue tele   -continue plan as per primary team  -PT / OT / Speech consults pending   -RN to call provider with any acute changes / questions / concerns  [phone]

## 2022-02-22 NOTE — PROGRESS NOTE ADULT - ASSESSMENT
61 year old female with PMH of HTN, asthma, VINICIUS, remote thyroidectomy, coming to hospital with complaints of dehydration and hypernatremia, found to have new diagnosis of diabetes type 2 w/ dka and admitted to MICU. Now improved and downgraded to medicine for further care.     R sided Tingling   New since last night, still persistent   r/o CVA   CT head -ve    x 1 given   Will start on ASA, statin  ECHO  MRI   Dysphagia screen  Lipid panel  PT/OT  Neurology consult   Transfer to stroke unit      Type 2 Diabetes, new onset uncontrolled w/ hyperglycemia  BG trends are improving   c/w current Lantus and Admelog doses   c/w sliding scale   Monitor fingersticks   Appreciate Endo consult   A1c 14.7   Diabetic teaching ongoing     HTN, Essential  c/w Coreg and Amlodipine   Monitor blood pressure     GERD   c/w famotidine    ANTONINA  Resolved   US renal reviewed with no acute pathology   Appreciate Nephro consult   Monitor cr   Avoid nephrotoxic meds     s/p partial thyroidectomy  Normal TSH     DVT Prophylaxis: Heparin SC    Dispo: Likely DC in next 1-2 days

## 2022-02-22 NOTE — CONSULT NOTE ADULT - SUBJECTIVE AND OBJECTIVE BOX
[facility]                                     Neurology at [place]                                 Berta Lomas, & Georges                                  [address]                                        [place], [place], [address]                                             [phone]    CC: right arm leg paresthesia  HPI:  The patient is a 61y Female who presented with DKA on 2/18/22.  Today she noted tingling in right arm and leg with last known normal last night about 2200.  She denied involvement of the face or any weakness.  She did not have other neurologic deficits in vision, hearing, balance or speech/language at the time.  She is at her baseline currently.  Neurology evaluation is requested.    PAST MEDICAL & SURGICAL HISTORY:  Asthma    Hypertension    Sleep apnea    S/P hysterectomy    S/P cholecystectomy    S/P thyroidectomy        MEDICATIONS  (STANDING):  amLODIPine   Tablet 10 milliGRAM(s) Oral daily  aspirin  chewable 81 milliGRAM(s) Oral daily  atorvastatin 40 milliGRAM(s) Oral at bedtime  carvedilol 6.25 milliGRAM(s) Oral every 12 hours  chlorhexidine 4% Liquid 1 Application(s) Topical <User Schedule>  dextrose 40% Gel 15 Gram(s) Oral once  dextrose 5%. 1000 milliLiter(s) (50 mL/Hr) IV Continuous <Continuous>  dextrose 5%. 1000 milliLiter(s) (100 mL/Hr) IV Continuous <Continuous>  dextrose 50% Injectable 25 Gram(s) IV Push once  dextrose 50% Injectable 12.5 Gram(s) IV Push once  dextrose 50% Injectable 25 Gram(s) IV Push once  famotidine Injectable 20 milliGRAM(s) IV Push every 12 hours  glucagon  Injectable 1 milliGRAM(s) IntraMuscular once  heparin   Injectable 5000 Unit(s) SubCutaneous every 8 hours  insulin glargine Injectable (LANTUS) 50 Unit(s) SubCutaneous every morning  insulin glargine Injectable (LANTUS) 50 Unit(s) SubCutaneous at bedtime  insulin lispro (ADMELOG) corrective regimen sliding scale   SubCutaneous Before meals and at bedtime  insulin lispro Injectable (ADMELOG) 16 Unit(s) SubCutaneous three times a day with meals  sodium chloride 0.45%. 1000 milliLiter(s) (75 mL/Hr) IV Continuous <Continuous>    MEDICATIONS  (PRN):      Allergies    No Known Allergies    Intolerances        SOCIAL HISTORY:  no tob,   no alcohol   no drugs    FAMILY HISTORY:  No pertinent family history in first degree relatives    no stroke in either parent      ROS: 14 point ROS negative other than what is present in HPI or below    Vital Signs Last 24 Hrs  T(C): 36.7 (22 Feb 2022 16:00), Max: 36.8 (22 Feb 2022 05:03)  T(F): 98.1 (22 Feb 2022 16:00), Max: 98.2 (22 Feb 2022 05:03)  HR: 85 (22 Feb 2022 16:00) (80 - 90)  BP: 151/52 (22 Feb 2022 16:00) (131/76 - 151/52)  BP(mean): 100 (22 Feb 2022 16:00) (87 - 100)  RR: 18 (22 Feb 2022 16:00) (17 - 18)  SpO2: 98% (22 Feb 2022 16:00) (96% - 99%)      General: NAD    Detailed Neurologic Exam:    Mental status: The patient is awake and alert and has normal attention span.  The patient is fully oriented in 3 spheres. The patient is oriented to current events. The patient is able to name objects, follow commands, repeat sentences.    Cranial nerves: Pupils equal and react symmetrically to light. There is no visual field deficit to confrontation. Extraocular motion is full with no nystagmus. There is no ptosis. Facial sensation is intact. Facial musculature is symmetric. Palate elevates symmetrically. Shoulder shrug is normal. Tongue is midline.    Motor: There is normal bulk and tone.  There is no tremor.  Strength is 5/5 in the right arm and leg.   Strength is 5/5 in the left arm and leg.    Sensation: Intact to light touch and pin in 4 extremities    Reflexes: 1+ throughout and plantar responses are flexor.    Cerebellar: There is no dysmetria on finger to nose testing.    Gait : deferred    [facility] SS:  DATE: 2/22/22  TIME: 1545  1A: Level of consciousness (0-3): 0  1B: Questions (0-2): 0  1C: Commands (0-2): 0  2: Gaze (0-2): 0  3: Visual fields (0-3): 0  4: Facial palsy (0-3): 0  MOTOR:  5A: Left arm motor drift (0-4): 0  5B: Right arm motor drift (0-4): 0  6A: Left leg motor drift (0-4): 0  6B: Right leg motor drift (0-4): 0  7: Limb ataxia (0-2): 0  SENSORY:  8: Sensation (0-2): 0  SPEECH:  9: Language (0-3): 0  10: Dysarthria (0-2): 0  EXTINCTION:  11: Extinction/inattention (0-2): 0    TOTAL SCORE: 0    prehospital mRS= 0      LABS:                         12.9   3.89  )-----------( 220      ( 22 Feb 2022 13:57 )             39.0       02-22    140  |  103  |  10.0  ----------------------------<  185<H>  3.7   |  24.0  |  0.55    Ca    8.6      22 Feb 2022 13:57    TPro  6.8  /  Alb  3.3  /  TBili  0.2<L>  /  DBili  x   /  AST  193<H>  /  ALT  94<H>  /  AlkPhos  99  02-22      PT/INR - ( 22 Feb 2022 13:57 )   PT: 12.5 sec;   INR: 1.08 ratio         PTT - ( 22 Feb 2022 13:57 )  PTT:28.1 sec    A1C with Estimated Average Glucose (02.17.22 @ 23:35)    A1C with Estimated Average Glucose Result: 14.7 %    Estimated Average Glucose: 375 mg/dL        RADIOLOGY & ADDITIONAL STUDIES (independently reviewed unless otherwise noted):  CT head: no acute CVA, mass or blood.  (+) SVID  CTA head: no aneurysm, AVM, LVO or sig stenosis in COW  CTA neck: no sig carotid or vertebral stenosis  CT Perfusion head - CBF<30% volume 0ml, Tmax>6s volume =0ml

## 2022-02-22 NOTE — PROGRESS NOTE ADULT - ASSESSMENT
Improved  ANTONINA   New onset DM , resolving DKA   Feels much better   Insulin / IVF noted ---> adjusted   No renal lesions on NC CT abdomen   No proteinuria   Reassess renal function in am , after CT angiogram     Hypernatremia - resolved   Urine NA < 20 , Uosm 600    HTN - Watch on meds

## 2022-02-23 LAB
ANION GAP SERPL CALC-SCNC: 13 MMOL/L — SIGNIFICANT CHANGE UP (ref 5–17)
BUN SERPL-MCNC: 13.2 MG/DL — SIGNIFICANT CHANGE UP (ref 8–20)
CALCIUM SERPL-MCNC: 8.8 MG/DL — SIGNIFICANT CHANGE UP (ref 8.6–10.2)
CHLORIDE SERPL-SCNC: 102 MMOL/L — SIGNIFICANT CHANGE UP (ref 98–107)
CHOLEST SERPL-MCNC: 152 MG/DL — SIGNIFICANT CHANGE UP
CO2 SERPL-SCNC: 24 MMOL/L — SIGNIFICANT CHANGE UP (ref 22–29)
CREAT SERPL-MCNC: 0.63 MG/DL — SIGNIFICANT CHANGE UP (ref 0.5–1.3)
GLUCOSE BLDC GLUCOMTR-MCNC: 221 MG/DL — HIGH (ref 70–99)
GLUCOSE BLDC GLUCOMTR-MCNC: 224 MG/DL — HIGH (ref 70–99)
GLUCOSE BLDC GLUCOMTR-MCNC: 235 MG/DL — HIGH (ref 70–99)
GLUCOSE BLDC GLUCOMTR-MCNC: 281 MG/DL — HIGH (ref 70–99)
GLUCOSE SERPL-MCNC: 244 MG/DL — HIGH (ref 70–99)
HCT VFR BLD CALC: 36.4 % — SIGNIFICANT CHANGE UP (ref 34.5–45)
HDLC SERPL-MCNC: 39 MG/DL — LOW
HGB BLD-MCNC: 11.7 G/DL — SIGNIFICANT CHANGE UP (ref 11.5–15.5)
LIPID PNL WITH DIRECT LDL SERPL: 86 MG/DL — SIGNIFICANT CHANGE UP
MAGNESIUM SERPL-MCNC: 1.8 MG/DL — SIGNIFICANT CHANGE UP (ref 1.6–2.6)
MCHC RBC-ENTMCNC: 29.5 PG — SIGNIFICANT CHANGE UP (ref 27–34)
MCHC RBC-ENTMCNC: 32.1 GM/DL — SIGNIFICANT CHANGE UP (ref 32–36)
MCV RBC AUTO: 91.9 FL — SIGNIFICANT CHANGE UP (ref 80–100)
NON HDL CHOLESTEROL: 113 MG/DL — SIGNIFICANT CHANGE UP
PHOSPHATE SERPL-MCNC: 3.5 MG/DL — SIGNIFICANT CHANGE UP (ref 2.4–4.7)
PLATELET # BLD AUTO: 199 K/UL — SIGNIFICANT CHANGE UP (ref 150–400)
POTASSIUM SERPL-MCNC: 3.5 MMOL/L — SIGNIFICANT CHANGE UP (ref 3.5–5.3)
POTASSIUM SERPL-SCNC: 3.5 MMOL/L — SIGNIFICANT CHANGE UP (ref 3.5–5.3)
RBC # BLD: 3.96 M/UL — SIGNIFICANT CHANGE UP (ref 3.8–5.2)
RBC # FLD: 12.8 % — SIGNIFICANT CHANGE UP (ref 10.3–14.5)
SODIUM SERPL-SCNC: 138 MMOL/L — SIGNIFICANT CHANGE UP (ref 135–145)
TRIGL SERPL-MCNC: 136 MG/DL — SIGNIFICANT CHANGE UP
WBC # BLD: 4.16 K/UL — SIGNIFICANT CHANGE UP (ref 3.8–10.5)
WBC # FLD AUTO: 4.16 K/UL — SIGNIFICANT CHANGE UP (ref 3.8–10.5)

## 2022-02-23 PROCEDURE — 99233 SBSQ HOSP IP/OBS HIGH 50: CPT

## 2022-02-23 PROCEDURE — 70551 MRI BRAIN STEM W/O DYE: CPT | Mod: 26

## 2022-02-23 PROCEDURE — 99232 SBSQ HOSP IP/OBS MODERATE 35: CPT

## 2022-02-23 RX ORDER — SODIUM CHLORIDE 9 MG/ML
500 INJECTION INTRAMUSCULAR; INTRAVENOUS; SUBCUTANEOUS ONCE
Refills: 0 | Status: DISCONTINUED | OUTPATIENT
Start: 2022-02-23 | End: 2022-02-23

## 2022-02-23 RX ORDER — FAMOTIDINE 10 MG/ML
20 INJECTION INTRAVENOUS
Refills: 0 | Status: DISCONTINUED | OUTPATIENT
Start: 2022-02-23 | End: 2022-02-24

## 2022-02-23 RX ORDER — INSULIN LISPRO 100/ML
20 VIAL (ML) SUBCUTANEOUS
Refills: 0 | Status: DISCONTINUED | OUTPATIENT
Start: 2022-02-23 | End: 2022-02-24

## 2022-02-23 RX ADMIN — Medication 4: at 21:36

## 2022-02-23 RX ADMIN — FAMOTIDINE 20 MILLIGRAM(S): 10 INJECTION INTRAVENOUS at 06:06

## 2022-02-23 RX ADMIN — Medication 0.5 MILLIGRAM(S): at 13:34

## 2022-02-23 RX ADMIN — HEPARIN SODIUM 5000 UNIT(S): 5000 INJECTION INTRAVENOUS; SUBCUTANEOUS at 21:35

## 2022-02-23 RX ADMIN — Medication 6: at 12:09

## 2022-02-23 RX ADMIN — CARVEDILOL PHOSPHATE 6.25 MILLIGRAM(S): 80 CAPSULE, EXTENDED RELEASE ORAL at 09:19

## 2022-02-23 RX ADMIN — INSULIN GLARGINE 50 UNIT(S): 100 INJECTION, SOLUTION SUBCUTANEOUS at 08:22

## 2022-02-23 RX ADMIN — Medication 20 UNIT(S): at 17:36

## 2022-02-23 RX ADMIN — ATORVASTATIN CALCIUM 40 MILLIGRAM(S): 80 TABLET, FILM COATED ORAL at 21:34

## 2022-02-23 RX ADMIN — FAMOTIDINE 20 MILLIGRAM(S): 10 INJECTION INTRAVENOUS at 18:48

## 2022-02-23 RX ADMIN — Medication 81 MILLIGRAM(S): at 12:09

## 2022-02-23 RX ADMIN — HEPARIN SODIUM 5000 UNIT(S): 5000 INJECTION INTRAVENOUS; SUBCUTANEOUS at 14:42

## 2022-02-23 RX ADMIN — INSULIN GLARGINE 50 UNIT(S): 100 INJECTION, SOLUTION SUBCUTANEOUS at 21:35

## 2022-02-23 RX ADMIN — Medication 16 UNIT(S): at 12:10

## 2022-02-23 RX ADMIN — Medication 4: at 08:21

## 2022-02-23 RX ADMIN — HEPARIN SODIUM 5000 UNIT(S): 5000 INJECTION INTRAVENOUS; SUBCUTANEOUS at 06:06

## 2022-02-23 RX ADMIN — CARVEDILOL PHOSPHATE 6.25 MILLIGRAM(S): 80 CAPSULE, EXTENDED RELEASE ORAL at 21:35

## 2022-02-23 RX ADMIN — AMLODIPINE BESYLATE 10 MILLIGRAM(S): 2.5 TABLET ORAL at 06:06

## 2022-02-23 RX ADMIN — Medication 4: at 17:37

## 2022-02-23 RX ADMIN — Medication 16 UNIT(S): at 08:22

## 2022-02-23 NOTE — SPEECH LANGUAGE PATHOLOGY EVALUATION - SLP PERTINENT HISTORY OF CURRENT PROBLEM
Per MD note: "61 year old female with PMH of HTN, asthma, VINICIUS, remote thyroidectomy, coming to hospital with complaints of dehydration and hypernatremia, found to have new diagnosis of diabetes type 2 w/ dka and admitted to MICU. Now improved and downgraded to medicine for further care." CT head negative

## 2022-02-23 NOTE — PROGRESS NOTE ADULT - ASSESSMENT
61 year old female with PMH of HTN, asthma, VINICIUS, remote thyroidectomy, coming to hospital with complaints of dehydration and hypernatremia, found to have new diagnosis of diabetes type 2 w/ dka and admitted to MICU. Now improved and downgraded to medicine for further care.     R sided Tingling   New since last night, still persistent   r/o CVA   CT head, CTA head/neck -ve for acute pathology  MRI brain today   c/w ASA, statin  ECHO reviewed   PT/OT today   Neurology consult appreciated     Type 2 Diabetes, new onset uncontrolled w/ hyperglycemia  BG trends are improving but running high today   c/w current Lantus and increase Admelog doses per Endo   c/w sliding scale   Monitor fingersticks   Appreciate Endo consult   A1c 14.7   Diabetic teaching ongoing     HTN, Essential  c/w Coreg and Amlodipine   Monitor blood pressure     GERD   c/w famotidine    ANTONINA  Resolved   US renal reviewed with no acute pathology   Appreciate Nephro consult   Monitor cr   Avoid nephrotoxic meds     s/p partial thyroidectomy  Normal TSH     DVT Prophylaxis: Heparin SC    Dispo: Likely DC in AM

## 2022-02-23 NOTE — OCCUPATIONAL THERAPY INITIAL EVALUATION ADULT - ADDITIONAL COMMENTS
Pt lives in private home with 3 steps to enter, HR.  Once inside there are no stairs to manage.   Pt drives.  She has no medical equipment.

## 2022-02-23 NOTE — DIETITIAN INITIAL EVALUATION ADULT. - OTHER INFO
61 year old female with PMH of HTN, asthma, VINICIUS, remote thyroidectomy, coming to hospital with complaints of dehydration and hypernatremia, found to have new diagnosis of diabetes type 2 w/ dka and admitted to MICU. Now improved and downgraded to medicine for further care. A1c 14.7 noted. Pt reported good po intake and severe polydipsia PTA. Continues with good po appetite/po intake, eating breakfast during assessment. Pt reported unintentional 30 lbs wt gain x ~2 years. Provided in depth written and verbal DM nutrition education. Encouraged pt to see outside RD for further education. All questions/concerns addressed. Last documented BM 2/20.

## 2022-02-23 NOTE — OCCUPATIONAL THERAPY INITIAL EVALUATION ADULT - FINE MOTOR COORDINATION, RIGHT HAND, FINGER TO NOSE, OT EVAL
Called pt and left a detailed message on identified voicemail regarding need to change date of colonoscopy from May 12th. Offered June 9th, with admit day being June 8th in the afternoon. Awaiting call back to discuss.   
Patient calling to state that moving his colonoscopy to June is alright with him.      Patient would also like to know if surgical stocking for the swelling in his legs could be sent to his house or if he needs to pick them up.  
Returned call to pt and confirmed date of colonoscopy. Discussed compression stockings, explained to pt that there are several different types available, so he should come in for a visit with either Dr. Bailey or Dr. Heredia for an evaluation of his leg swelling to make sure he gets the correct type. Pt states that he will start with Dr. Bailey, and if he refers him, he will see Dr. Heredia re: leg swelling. OR, IAR and house supervisor advised of colonoscopy date change.   
normal performance

## 2022-02-23 NOTE — PROGRESS NOTE ADULT - SUBJECTIVE AND OBJECTIVE BOX
Interval events:  No overnight events, pt feels much better today.  follow up on T2DM/partial thyroidectomy (normal TSH)  Pending MRI    patient seen and examined at bedside.    REVIEW OF SYSTEMS:    CONSTITUTIONAL: No fever, weight loss, or fatigue  EYES: No eye pain, visual disturbances, or discharge  ENMT:  No difficulty hearing, tinnitus, vertigo; No sinus or throat pain  NECK: No pain or stiffness  RESPIRATORY: No cough, wheezing, chills or hemoptysis; No shortness of breath  CARDIOVASCULAR: No chest pain, palpitations, dizziness, or leg swelling  GASTROINTESTINAL: No abdominal or epigastric pain. No nausea, vomiting, or hematemesis; No diarrhea or constipation. No melena or hematochezia.  NEUROLOGICAL: No headaches, memory loss, loss of strength, numbness, or tremors.   SKIN: No itching, burning, rashes, or lesions   MUSCULOSKELETAL: No joint pain or swelling; No muscle, back, or extremity pain  PSYCHIATRIC: No depression, anxiety, mood swings, or difficulty sleeping    No Known Allergies      MEDICATIONS  (STANDING):  amLODIPine   Tablet 10 milliGRAM(s) Oral daily  aspirin  chewable 81 milliGRAM(s) Oral daily  atorvastatin 40 milliGRAM(s) Oral at bedtime  carvedilol 6.25 milliGRAM(s) Oral every 12 hours  chlorhexidine 4% Liquid 1 Application(s) Topical <User Schedule>  dextrose 40% Gel 15 Gram(s) Oral once  dextrose 5%. 1000 milliLiter(s) (50 mL/Hr) IV Continuous <Continuous>  dextrose 5%. 1000 milliLiter(s) (100 mL/Hr) IV Continuous <Continuous>  dextrose 50% Injectable 25 Gram(s) IV Push once  dextrose 50% Injectable 12.5 Gram(s) IV Push once  dextrose 50% Injectable 25 Gram(s) IV Push once  famotidine Injectable 20 milliGRAM(s) IV Push every 12 hours  glucagon  Injectable 1 milliGRAM(s) IntraMuscular once  heparin   Injectable 5000 Unit(s) SubCutaneous every 8 hours  insulin glargine Injectable (LANTUS) 50 Unit(s) SubCutaneous every morning  insulin glargine Injectable (LANTUS) 50 Unit(s) SubCutaneous at bedtime  insulin lispro (ADMELOG) corrective regimen sliding scale   SubCutaneous Before meals and at bedtime  insulin lispro Injectable (ADMELOG) 16 Unit(s) SubCutaneous three times a day with meals  sodium chloride 0.45%. 1000 milliLiter(s) (75 mL/Hr) IV Continuous <Continuous>  sodium chloride 0.9% Bolus 500 milliLiter(s) IV Bolus once    MEDICATIONS  (PRN):      Vital Signs Last 24 Hrs  T(C): 36.7 (23 Feb 2022 08:00), Max: 36.9 (22 Feb 2022 20:00)  T(F): 98 (23 Feb 2022 08:00), Max: 98.5 (22 Feb 2022 20:00)  HR: 89 (23 Feb 2022 12:00) (78 - 91)  BP: 150/74 (23 Feb 2022 08:00) (131/66 - 152/78)  BP(mean): 92 (23 Feb 2022 08:00) (79 - 100)  RR: 16 (23 Feb 2022 12:00) (16 - 18)  SpO2: 97% (23 Feb 2022 12:00) (97% - 100%)  Weight (kg): 103.7 (02-18-22 @ 03:33)      LABS  02-23    138  |  102  |  13.2  ----------------------------<  244<H>  3.5   |  24.0  |  0.63    Ca    8.8      23 Feb 2022 05:59  Phos  3.5     02-23  Mg     1.8     02-23    TPro  6.8  /  Alb  3.3  /  TBili  0.2<L>  /  DBili  x   /  AST  193<H>  /  ALT  94<H>  /  AlkPhos  99  02-22                          11.7   4.16  )-----------( 199      ( 23 Feb 2022 05:59 )             36.4     HDL Cholesterol, Serum: 39 mg/dL (02-23-22 @ 05:59)  Cholesterol, Serum: 152 mg/dL (02-23-22 @ 05:59)  Triglycerides, Serum: 136 mg/dL (02-23-22 @ 05:59)    CT Abdomen and Pelvis No Cont:   ACC: [id number] EXAM:  CT ABDOMEN AND PELVIS                        ACC: [id number] EXAM:  CT CHEST                          PROCEDURE DATE:  02/18/2022          INTERPRETATION:  CLINICAL INFORMATION: patient brought in by daughter   states that she "is not feeling well" complaining of abdominal pain N/V   not eating    COMPARISON: Chest CT 8/2/2016    CONTRAST/COMPLICATIONS:  IV Contrast: NONE  Oral Contrast: NONE  Complications: None reported at time of study completion    PROCEDURE:  CT of the Chest, abdomen and pelvis were performed.  Sagittal and coronal reformats were performed.    FINDINGS:    CHEST:  Suboptimal study due to patient positioning with arms at the side which   causes streak artifact. Limited evaluation of the vascular structures and   soft tissues without IV contrast.    LUNGS AND AIRWAYS: Patent central airways.  Lungs are clear.  PLEURA: No pleural effusion. No pneumothorax.  MEDIASTINUM AND PRESTON: No lymphadenopathy.  VESSELS: Within normal limits.  HEART: Heart size is normal. No pericardial effusion.  CHEST WALL AND LOWER NECK: Visualized proximal right upper extremity   demonstrates nonspecific subcutaneous edema with fat stranding and   subfascial fluid with foci of air.  VISUALIZED UPPER ABDOMEN: Within normal limits.  BONES: Within normal limits.    ABDOMEN/PELVIS:  LIVER: Within normal limits.  BILE DUCTS: Normal caliber.  GALLBLADDER: Cholecystectomy.  SPLEEN: Within normal limits.  PANCREAS: Within normal limits.  ADRENALS: Within normal limits.  KIDNEYS/URETERS: No hydronephrosis or perinephric stranding.    BLADDER: Within normal limits.  REPRODUCTIVE ORGANS: Hysterectomy.    BOWEL: No bowel obstruction. Appendix is normal. Descending and sigmoid   colon diverticulosis. No evidence of diverticulitis. Small hiatal hernia.  PERITONEUM: No ascites.  VESSELS: Within normal limits.  RETROPERITONEUM/LYMPH NODES: No lymphadenopathy.  ABDOMINAL WALL: Within normal limits.  BONES: Within normal limits.    IMPRESSION:  No acute finding in the chest, abdomen or pelvis to explain patient's   symptomatology.    Subcutaneous edema and subfascial fluid with foci of subcutaneous air in   the visualized proximal right upper extremity. Please correlate   clinically for recent intervention versus cellulitis.    --- End of Report ---        NAMAN BROUSSARD MD; Attending Radiologist  This document has been electronically signed. Feb 18 2022  6:00AM (02-18-22 @ 03:09)  Thyroid Stimulating Hormone, Serum: 0.86 uIU/mL (02-17-22 @ 23:35)  A1C with Estimated Average Glucose Result: 14.7 % (02-17-22 @ 23:35)    Alkaline Phosphatase, Serum: 99 U/L (02-22-22 @ 13:57)  Albumin, Serum: 3.3 g/dL (02-22-22 @ 13:57)  Aspartate Aminotransferase (AST/SGOT): 193 U/L (02-22-22 @ 13:57)  Alanine Aminotransferase (ALT/SGPT): 94 U/L (02-22-22 @ 13:57)      CAPILLARY BLOOD GLUCOSE      POCT Blood Glucose.: 281 mg/dL (23 Feb 2022 12:00)  POCT Blood Glucose.: 221 mg/dL (23 Feb 2022 08:08)  POCT Blood Glucose.: 337 mg/dL (22 Feb 2022 22:20)  POCT Blood Glucose.: 247 mg/dL (22 Feb 2022 17:06)   Interval events:  No overnight events, pt feels much better today.  follow up on T2DM/partial thyroidectomy (normal TSH)    patient seen and examined at bedside.    REVIEW OF SYSTEMS:    CONSTITUTIONAL: No fever, weight loss, or fatigue  EYES: No eye pain, visual disturbances, or discharge  ENMT:  No difficulty hearing, tinnitus, vertigo; No sinus or throat pain  NECK: No pain or stiffness  RESPIRATORY: No cough, wheezing, chills or hemoptysis; No shortness of breath  CARDIOVASCULAR: No chest pain, palpitations, dizziness, or leg swelling  GASTROINTESTINAL: No abdominal or epigastric pain. No nausea, vomiting, or hematemesis; No diarrhea or constipation. No melena or hematochezia.  NEUROLOGICAL: No headaches, memory loss, loss of strength, numbness, or tremors.   SKIN: No itching, burning, rashes, or lesions   MUSCULOSKELETAL: No joint pain or swelling; No muscle, back, or extremity pain  PSYCHIATRIC: No depression, anxiety, mood swings, or difficulty sleeping    No Known Allergies      MEDICATIONS  (STANDING):  amLODIPine   Tablet 10 milliGRAM(s) Oral daily  aspirin  chewable 81 milliGRAM(s) Oral daily  atorvastatin 40 milliGRAM(s) Oral at bedtime  carvedilol 6.25 milliGRAM(s) Oral every 12 hours  chlorhexidine 4% Liquid 1 Application(s) Topical <User Schedule>  dextrose 40% Gel 15 Gram(s) Oral once  dextrose 5%. 1000 milliLiter(s) (50 mL/Hr) IV Continuous <Continuous>  dextrose 5%. 1000 milliLiter(s) (100 mL/Hr) IV Continuous <Continuous>  dextrose 50% Injectable 25 Gram(s) IV Push once  dextrose 50% Injectable 12.5 Gram(s) IV Push once  dextrose 50% Injectable 25 Gram(s) IV Push once  famotidine Injectable 20 milliGRAM(s) IV Push every 12 hours  glucagon  Injectable 1 milliGRAM(s) IntraMuscular once  heparin   Injectable 5000 Unit(s) SubCutaneous every 8 hours  insulin glargine Injectable (LANTUS) 50 Unit(s) SubCutaneous every morning  insulin glargine Injectable (LANTUS) 50 Unit(s) SubCutaneous at bedtime  insulin lispro (ADMELOG) corrective regimen sliding scale   SubCutaneous Before meals and at bedtime  insulin lispro Injectable (ADMELOG) 16 Unit(s) SubCutaneous three times a day with meals  sodium chloride 0.45%. 1000 milliLiter(s) (75 mL/Hr) IV Continuous <Continuous>  sodium chloride 0.9% Bolus 500 milliLiter(s) IV Bolus once    MEDICATIONS  (PRN):      Vital Signs Last 24 Hrs  T(C): 36.7 (23 Feb 2022 08:00), Max: 36.9 (22 Feb 2022 20:00)  T(F): 98 (23 Feb 2022 08:00), Max: 98.5 (22 Feb 2022 20:00)  HR: 89 (23 Feb 2022 12:00) (78 - 91)  BP: 150/74 (23 Feb 2022 08:00) (131/66 - 152/78)  BP(mean): 92 (23 Feb 2022 08:00) (79 - 100)  RR: 16 (23 Feb 2022 12:00) (16 - 18)  SpO2: 97% (23 Feb 2022 12:00) (97% - 100%)  Weight (kg): 103.7 (02-18-22 @ 03:33)      LABS  02-23    138  |  102  |  13.2  ----------------------------<  244<H>  3.5   |  24.0  |  0.63    Ca    8.8      23 Feb 2022 05:59  Phos  3.5     02-23  Mg     1.8     02-23    TPro  6.8  /  Alb  3.3  /  TBili  0.2<L>  /  DBili  x   /  AST  193<H>  /  ALT  94<H>  /  AlkPhos  99  02-22                          11.7   4.16  )-----------( 199      ( 23 Feb 2022 05:59 )             36.4     HDL Cholesterol, Serum: 39 mg/dL (02-23-22 @ 05:59)  Cholesterol, Serum: 152 mg/dL (02-23-22 @ 05:59)  Triglycerides, Serum: 136 mg/dL (02-23-22 @ 05:59)    CT Abdomen and Pelvis No Cont:   ACC: [id number] EXAM:  CT ABDOMEN AND PELVIS                        ACC: [id number] EXAM:  CT CHEST                          PROCEDURE DATE:  02/18/2022          INTERPRETATION:  CLINICAL INFORMATION: patient brought in by daughter   states that she "is not feeling well" complaining of abdominal pain N/V   not eating    COMPARISON: Chest CT 8/2/2016    CONTRAST/COMPLICATIONS:  IV Contrast: NONE  Oral Contrast: NONE  Complications: None reported at time of study completion    PROCEDURE:  CT of the Chest, abdomen and pelvis were performed.  Sagittal and coronal reformats were performed.    FINDINGS:    CHEST:  Suboptimal study due to patient positioning with arms at the side which   causes streak artifact. Limited evaluation of the vascular structures and   soft tissues without IV contrast.    LUNGS AND AIRWAYS: Patent central airways.  Lungs are clear.  PLEURA: No pleural effusion. No pneumothorax.  MEDIASTINUM AND PRESTON: No lymphadenopathy.  VESSELS: Within normal limits.  HEART: Heart size is normal. No pericardial effusion.  CHEST WALL AND LOWER NECK: Visualized proximal right upper extremity   demonstrates nonspecific subcutaneous edema with fat stranding and   subfascial fluid with foci of air.  VISUALIZED UPPER ABDOMEN: Within normal limits.  BONES: Within normal limits.    ABDOMEN/PELVIS:  LIVER: Within normal limits.  BILE DUCTS: Normal caliber.  GALLBLADDER: Cholecystectomy.  SPLEEN: Within normal limits.  PANCREAS: Within normal limits.  ADRENALS: Within normal limits.  KIDNEYS/URETERS: No hydronephrosis or perinephric stranding.    BLADDER: Within normal limits.  REPRODUCTIVE ORGANS: Hysterectomy.    BOWEL: No bowel obstruction. Appendix is normal. Descending and sigmoid   colon diverticulosis. No evidence of diverticulitis. Small hiatal hernia.  PERITONEUM: No ascites.  VESSELS: Within normal limits.  RETROPERITONEUM/LYMPH NODES: No lymphadenopathy.  ABDOMINAL WALL: Within normal limits.  BONES: Within normal limits.    IMPRESSION:  No acute finding in the chest, abdomen or pelvis to explain patient's   symptomatology.    Subcutaneous edema and subfascial fluid with foci of subcutaneous air in   the visualized proximal right upper extremity. Please correlate   clinically for recent intervention versus cellulitis.    --- End of Report ---        NAMAN BROUSSARD MD; Attending Radiologist  This document has been electronically signed. Feb 18 2022  6:00AM (02-18-22 @ 03:09)  Thyroid Stimulating Hormone, Serum: 0.86 uIU/mL (02-17-22 @ 23:35)  A1C with Estimated Average Glucose Result: 14.7 % (02-17-22 @ 23:35)    Alkaline Phosphatase, Serum: 99 U/L (02-22-22 @ 13:57)  Albumin, Serum: 3.3 g/dL (02-22-22 @ 13:57)  Aspartate Aminotransferase (AST/SGOT): 193 U/L (02-22-22 @ 13:57)  Alanine Aminotransferase (ALT/SGPT): 94 U/L (02-22-22 @ 13:57)      CAPILLARY BLOOD GLUCOSE      POCT Blood Glucose.: 281 mg/dL (23 Feb 2022 12:00)  POCT Blood Glucose.: 221 mg/dL (23 Feb 2022 08:08)  POCT Blood Glucose.: 337 mg/dL (22 Feb 2022 22:20)  POCT Blood Glucose.: 247 mg/dL (22 Feb 2022 17:06)

## 2022-02-23 NOTE — DIETITIAN INITIAL EVALUATION ADULT. - PERTINENT LABORATORY DATA
02-23 Na138 mmol/L Glu 244 mg/dL<H> K+ 3.5 mmol/L Cr  0.63 mg/dL BUN 13.2 mg/dL Phos 3.5 mg/dL Alb n/a   PAB n/a

## 2022-02-23 NOTE — PROGRESS NOTE ADULT - SUBJECTIVE AND OBJECTIVE BOX
[facility]                                     [facility] [place]                                 Berta Lomas, & Georges                                  [address]                                        [place], [place], [address]                                             [phone]    CC: right arm leg paresthesia  HPI:  The patient is a 61y Female who presented with DKA on 2/18/22.  Today she noted tingling in right arm and leg with last known normal last night about 2200.  She denied involvement of the face or any weakness.  She did not have other neurologic deficits in vision, hearing, balance or speech/language at the time.  She is at her baseline currently.  Neurology evaluation is requested.    Interval history: no further complaints of paresthesia    Review of systems (neurology): Denies headache or dizziness. Denies weakness/numbness.  Denies speech/language deficits. Denies diplopia/blurred vision.  Denies confusion    MEDICATIONS  (STANDING):  amLODIPine   Tablet 10 milliGRAM(s) Oral daily  aspirin  chewable 81 milliGRAM(s) Oral daily  atorvastatin 40 milliGRAM(s) Oral at bedtime  carvedilol 6.25 milliGRAM(s) Oral every 12 hours  chlorhexidine 4% Liquid 1 Application(s) Topical <User Schedule>  dextrose 40% Gel 15 Gram(s) Oral once  dextrose 5%. 1000 milliLiter(s) (50 mL/Hr) IV Continuous <Continuous>  dextrose 5%. 1000 milliLiter(s) (100 mL/Hr) IV Continuous <Continuous>  dextrose 50% Injectable 25 Gram(s) IV Push once  dextrose 50% Injectable 12.5 Gram(s) IV Push once  dextrose 50% Injectable 25 Gram(s) IV Push once  famotidine Injectable 20 milliGRAM(s) IV Push every 12 hours  glucagon  Injectable 1 milliGRAM(s) IntraMuscular once  heparin   Injectable 5000 Unit(s) SubCutaneous every 8 hours  insulin glargine Injectable (LANTUS) 50 Unit(s) SubCutaneous every morning  insulin glargine Injectable (LANTUS) 50 Unit(s) SubCutaneous at bedtime  insulin lispro (ADMELOG) corrective regimen sliding scale   SubCutaneous Before meals and at bedtime  insulin lispro Injectable (ADMELOG) 20 Unit(s) SubCutaneous three times a day before meals  sodium chloride 0.45%. 1000 milliLiter(s) (75 mL/Hr) IV Continuous <Continuous>  sodium chloride 0.9% Bolus 500 milliLiter(s) IV Bolus once    MEDICATIONS  (PRN):      Vital Signs Last 24 Hrs  T(C): 36.9 (23 Feb 2022 16:09), Max: 36.9 (22 Feb 2022 20:00)  T(F): 98.4 (23 Feb 2022 16:09), Max: 98.5 (22 Feb 2022 20:00)  HR: 89 (23 Feb 2022 16:09) (78 - 91)  BP: 127/77 (23 Feb 2022 16:09) (127/77 - 152/78)  BP(mean): 92 (23 Feb 2022 08:00) (79 - 94)  RR: 87 (23 Feb 2022 16:09) (16 - 87)  SpO2: 97% (23 Feb 2022 16:09) (97% - 100%)    Detailed Neurologic Exam:    Mental status: The patient is awake and alert and has normal attention span.  The patient is fully oriented in 3 spheres. The patient is oriented to current events. The patient is able to name objects, follow commands, repeat sentences.    Cranial nerves: Pupils equal and react symmetrically to light. There is no visual field deficit to confrontation. Extraocular motion is full with no nystagmus. There is no ptosis. Facial sensation is intact. Facial musculature is symmetric. Palate elevates symmetrically. Shoulder shrug is normal. Tongue is midline.    Motor: There is normal bulk and tone.  There is no tremor.  Strength is 5/5 in the right arm and leg.   Strength is 5/5 in the left arm and leg.    Sensation: Intact to light touch and pin in 4 extremities    Reflexes: 1+ throughout and plantar responses are flexor.    Cerebellar: There is no dysmetria on finger to nose testing.    Gait : deferred      LABS:                           11.7   4.16  )-----------( 199      ( 23 Feb 2022 05:59 )             36.4     02-23    138  |  102  |  13.2  ----------------------------<  244<H>  3.5   |  24.0  |  0.63    Ca    8.8      23 Feb 2022 05:59  Phos  3.5     02-23  Mg     1.8     02-23    TPro  6.8  /  Alb  3.3  /  TBili  0.2<L>  /  DBili  x   /  AST  193<H>  /  ALT  94<H>  /  AlkPhos  99  02-22    LIVER FUNCTIONS - ( 22 Feb 2022 13:57 )  Alb: 3.3 g/dL / Pro: 6.8 g/dL / ALK PHOS: 99 U/L / ALT: 94 U/L / AST: 193 U/L / GGT: x           PT/INR - ( 22 Feb 2022 13:57 )   PT: 12.5 sec;   INR: 1.08 ratio         PTT - ( 22 Feb 2022 13:57 )  PTT:28.1 sec    Lipid Profile (02.23.22 @ 05:59)    Cholesterol, Serum: 152 mg/dL    Triglycerides, Serum: 136 mg/dL    HDL Cholesterol, Serum: 39 mg/dL    Non HDL Cholesterol: 113: Patients Atherosclerotic Cardiovascular Disease (ASCVD) Risk  Optimal Level (mg/dL)  LDL Cholesterol (LDL-C)  All Patients                                < 100  ASCVD at Very High Risk1    < 70  Non-HDL Cholesterol (Non-HDL-C)  All Patients                       < 130  ASCVD at Very High Risk1   < 100  Non-HDL-Cholesterol (Non-HDL-C) is also a key target for cardiovascular  risk reduction.  Consider Familial Hypercholesterolemia when: LDL-C > 190 mg/dL or  Non-HDL-C > 220 mg/dL.  LDL-C calculation using the Friedewald equation is not provided when  triglycerides > 400 mg/dL, in which case we recommend repeating the test  after fasting, if it was not done before.  When triglycerides >150 mg/dL, calculated LDL-C is provided but maystill  be inaccurate (particularly when LDL-C < 70 mg/dL). It can be  recalculated off-line using other equations (e.g. Rodney SS, Homer TOMLINSON,  Javier ROY, et al.KORTNEY 2013;310:2061 - 8).  1 CaribouDaniel.,et al. "2019 AHA/ACC. . . guideline on the  management of blood cholesterol: a report of the American College of  Cardiology/American Heart Association Task Force on Clinical Practice  Guidelines." Circulation;139:e1082 - e1143.  These values apply only to persons 20 years and older.  Lipid Panel updated with new test, reference ranges and interpretive  comments effective 10-. mg/dL    LDL Cholesterol Calculated: 86 mg/dL        A1C with Estimated Average Glucose (02.17.22 @ 23:35)    A1C with Estimated Average Glucose Result: 14.7 %    Estimated Average Glucose: 375 mg/dL      RADIOLOGY & ADDITIONAL STUDIES (independently reviewed unless otherwise noted):  MRI brain- no acute CVA, mass or blood.  minimal SVID    < from: TTE Echo Complete w/o Contrast w/ Doppler (02.22.22 @ 16:18) >  Summary:   1. Endocardial visualization was enhanced with intravenous echo contrast.   2. There is mild concentric left ventricular hypertrophy.   3. Hyperdynamic global left ventricular systolic function with cavity   obliteration, no obstruction.   4. Left ventricular ejection fraction, by visual estimation, is >75%.   5. Spectral Doppler shows impaired relaxation pattern of left   ventricular myocardial filling (Grade I diastolic dysfunction).   6. Normal right ventricular size and function.   7. Mildly enlarged left atrium.   8. The right atrium is normal in size.   9. Mild mitral annular calcification.  10. No evidence of mitral valve regurgitation.  11. There is no evidence of pericardial effusion.      CT head: no acute CVA, mass or blood.  (+) SVID  CTA head: no aneurysm, AVM, LVO or sig stenosis in COW  CTA neck: no sig carotid or vertebral stenosis  CT Perfusion head - CBF<30% volume 0ml, Tmax>6s volume =0ml

## 2022-02-23 NOTE — PROGRESS NOTE ADULT - ASSESSMENT
62 y/o F with a h/o HTN, asthma, VINICIUS, remote thyroidectomy admit with severe DKA, newly Dx T2DM, hypernatremia and ANTONINA    1. New onset T2DM - A1c 14.7%, s/p DKA, blood sugars improving  - Continue Lantus 50 units BID  - Increase Admelog 20 units TID with meals and SSI (ordered)  - Cpeptide/QI ordered to rule out T1DM  - Will f/u with our office outpatient  - Continue insulin/diabetic education    Endocrine follow up appointment:  [facility] [place] [address] [address], 2nd floor  March 15th at 8:00am  Provider: OMER Barrow    2. S/p partial thyroidectomy  - Normal TSH, no thyroid hormone needed    3. New right sided tingling  - CTP negative  - Pending MRI of brain   60 y/o F with a h/o HTN, asthma, VINICIUS, remote thyroidectomy admit with severe DKA, newly Dx T2DM, hypernatremia and ANTONINA    1. New onset T2DM - A1c 14.7%, s/p DKA, blood sugars improving  - Continue Lantus 50 units BID  - Increase Admelog 20 units TID with meals and SSI (ordered)  - Cpeptide/QI ordered to rule out T1DM. C-Peptide low at 0.9, insulin will remain diabetic drug of choice.  - Will f/u with our office outpatient  - Continue insulin/diabetic education    Endocrine follow up appointment:  [facility] [place] [address], 2nd floor  March 15th at 8:00am  Provider: OMER Barrow    2. S/p partial thyroidectomy  - Normal TSH, no thyroid hormone needed    3. New right sided tingling  - CTP negative  - Pending MRI of brain

## 2022-02-23 NOTE — PROGRESS NOTE ADULT - SUBJECTIVE AND OBJECTIVE BOX
[facility] Division of Hospital Medicine    Chief Complaint:  DKA    SUBJECTIVE / OVERNIGHT EVENTS:  Pt reports she feels better, tingling has resolved   She feels anxious about her BG running high     Patient denies chest pain, SOB, abd pain, N/V, fever, chills, dysuria or any other complaints. All remainder ROS negative.     MEDICATIONS  (STANDING):  amLODIPine   Tablet 10 milliGRAM(s) Oral daily  aspirin  chewable 81 milliGRAM(s) Oral daily  atorvastatin 40 milliGRAM(s) Oral at bedtime  carvedilol 6.25 milliGRAM(s) Oral every 12 hours  chlorhexidine 4% Liquid 1 Application(s) Topical <User Schedule>  dextrose 40% Gel 15 Gram(s) Oral once  dextrose 5%. 1000 milliLiter(s) (50 mL/Hr) IV Continuous <Continuous>  dextrose 5%. 1000 milliLiter(s) (100 mL/Hr) IV Continuous <Continuous>  dextrose 50% Injectable 25 Gram(s) IV Push once  dextrose 50% Injectable 12.5 Gram(s) IV Push once  dextrose 50% Injectable 25 Gram(s) IV Push once  famotidine Injectable 20 milliGRAM(s) IV Push every 12 hours  glucagon  Injectable 1 milliGRAM(s) IntraMuscular once  heparin   Injectable 5000 Unit(s) SubCutaneous every 8 hours  insulin glargine Injectable (LANTUS) 50 Unit(s) SubCutaneous every morning  insulin glargine Injectable (LANTUS) 50 Unit(s) SubCutaneous at bedtime  insulin lispro (ADMELOG) corrective regimen sliding scale   SubCutaneous Before meals and at bedtime  insulin lispro Injectable (ADMELOG) 20 Unit(s) SubCutaneous three times a day before meals  sodium chloride 0.45%. 1000 milliLiter(s) (75 mL/Hr) IV Continuous <Continuous>  sodium chloride 0.9% Bolus 500 milliLiter(s) IV Bolus once    MEDICATIONS  (PRN):        I&O's Summary    23 Feb 2022 07:01  -  23 Feb 2022 15:51  --------------------------------------------------------  IN: 660 mL / OUT: 0 mL / NET: 660 mL        PHYSICAL EXAM:  Vital Signs Last 24 Hrs  T(C): 36.7 (23 Feb 2022 08:00), Max: 36.9 (22 Feb 2022 20:00)  T(F): 98 (23 Feb 2022 08:00), Max: 98.5 (22 Feb 2022 20:00)  HR: 89 (23 Feb 2022 12:00) (78 - 91)  BP: 150/74 (23 Feb 2022 08:00) (131/66 - 152/78)  BP(mean): 92 (23 Feb 2022 08:00) (79 - 100)  RR: 16 (23 Feb 2022 12:00) (16 - 18)  SpO2: 97% (23 Feb 2022 12:00) (97% - 100%)      CONSTITUTIONAL: NAD, sitting up in bed  ENMT: Moist oral mucosa, PERRLA, EOMI   RESPIRATORY: Normal respiratory effort; lungs are clear to auscultation bilaterally  CARDIOVASCULAR: Regular rate and rhythm, normal S1 and S2, No lower extremity edema  ABDOMEN: Obese, Nontender to palpation, normoactive bowel sounds  MUSCULOSKELETAL: No clubbing or cyanosis of digits  PSYCH: A+O to person, place, and time; affect appropriate  NEUROLOGY: No gross sensory or motor deficits   SKIN: No rashes; no palpable lesions       LABS:                        11.7   4.16  )-----------( 199      ( 23 Feb 2022 05:59 )             36.4     02-23    138  |  102  |  13.2  ----------------------------<  244<H>  3.5   |  24.0  |  0.63    Ca    8.8      23 Feb 2022 05:59  Phos  3.5     02-23  Mg     1.8     02-23    TPro  6.8  /  Alb  3.3  /  TBili  0.2<L>  /  DBili  x   /  AST  193<H>  /  ALT  94<H>  /  AlkPhos  99  02-22    PT/INR - ( 22 Feb 2022 13:57 )   PT: 12.5 sec;   INR: 1.08 ratio         PTT - ( 22 Feb 2022 13:57 )  PTT:28.1 sec  CARDIAC MARKERS ( 22 Feb 2022 13:57 )  x     / <0.01 ng/mL / 98 U/L / x     / x              CAPILLARY BLOOD GLUCOSE      POCT Blood Glucose.: 281 mg/dL (23 Feb 2022 12:00)  POCT Blood Glucose.: 221 mg/dL (23 Feb 2022 08:08)  POCT Blood Glucose.: 337 mg/dL (22 Feb 2022 22:20)  POCT Blood Glucose.: 247 mg/dL (22 Feb 2022 17:06)

## 2022-02-23 NOTE — PROGRESS NOTE ADULT - ASSESSMENT
The patient is a 61y Female who is followed by neurology because of transient paresthesia    Possible TIA  risk factors of diabetes, HTN  Suggest the following:    - MRI brain as above   - echocardiogram as above  - continue ASA 81  - lipid profile, goal LDL<70, LDL= 86 continue lipitor 40,   - ApxT8a=39.7%, will need tighter glycemic control  - DVT prophylaxis    discussed with Dr Connell    will follow with you    Shashi Hampton MD PhD   [id number]

## 2022-02-24 ENCOUNTER — TRANSCRIPTION ENCOUNTER (OUTPATIENT)
Age: 62
End: 2022-02-24

## 2022-02-24 VITALS
OXYGEN SATURATION: 99 % | TEMPERATURE: 98 F | DIASTOLIC BLOOD PRESSURE: 63 MMHG | HEART RATE: 90 BPM | SYSTOLIC BLOOD PRESSURE: 137 MMHG | RESPIRATION RATE: 18 BRPM

## 2022-02-24 LAB
GLUCOSE BLDC GLUCOMTR-MCNC: 168 MG/DL — HIGH (ref 70–99)
GLUCOSE BLDC GLUCOMTR-MCNC: 181 MG/DL — HIGH (ref 70–99)
GLUCOSE BLDC GLUCOMTR-MCNC: 235 MG/DL — HIGH (ref 70–99)
SARS-COV-2 RNA SPEC QL NAA+PROBE: SIGNIFICANT CHANGE UP

## 2022-02-24 PROCEDURE — 80061 LIPID PANEL: CPT

## 2022-02-24 PROCEDURE — 83036 HEMOGLOBIN GLYCOSYLATED A1C: CPT

## 2022-02-24 PROCEDURE — 82570 ASSAY OF URINE CREATININE: CPT

## 2022-02-24 PROCEDURE — 83930 ASSAY OF BLOOD OSMOLALITY: CPT

## 2022-02-24 PROCEDURE — 99232 SBSQ HOSP IP/OBS MODERATE 35: CPT

## 2022-02-24 PROCEDURE — 85730 THROMBOPLASTIN TIME PARTIAL: CPT

## 2022-02-24 PROCEDURE — 85610 PROTHROMBIN TIME: CPT

## 2022-02-24 PROCEDURE — 97163 PT EVAL HIGH COMPLEX 45 MIN: CPT

## 2022-02-24 PROCEDURE — 96374 THER/PROPH/DIAG INJ IV PUSH: CPT

## 2022-02-24 PROCEDURE — 72125 CT NECK SPINE W/O DYE: CPT | Mod: MA

## 2022-02-24 PROCEDURE — 70496 CT ANGIOGRAPHY HEAD: CPT

## 2022-02-24 PROCEDURE — 84132 ASSAY OF SERUM POTASSIUM: CPT

## 2022-02-24 PROCEDURE — 80307 DRUG TEST PRSMV CHEM ANLYZR: CPT

## 2022-02-24 PROCEDURE — 82435 ASSAY OF BLOOD CHLORIDE: CPT

## 2022-02-24 PROCEDURE — 99291 CRITICAL CARE FIRST HOUR: CPT

## 2022-02-24 PROCEDURE — 83735 ASSAY OF MAGNESIUM: CPT

## 2022-02-24 PROCEDURE — 84134 ASSAY OF PREALBUMIN: CPT

## 2022-02-24 PROCEDURE — 84540 ASSAY OF URINE/UREA-N: CPT

## 2022-02-24 PROCEDURE — 92523 SPEECH SOUND LANG COMPREHEN: CPT

## 2022-02-24 PROCEDURE — 82010 KETONE BODYS QUAN: CPT

## 2022-02-24 PROCEDURE — 85027 COMPLETE CBC AUTOMATED: CPT

## 2022-02-24 PROCEDURE — 84156 ASSAY OF PROTEIN URINE: CPT

## 2022-02-24 PROCEDURE — 85018 HEMOGLOBIN: CPT

## 2022-02-24 PROCEDURE — 84681 ASSAY OF C-PEPTIDE: CPT

## 2022-02-24 PROCEDURE — 93005 ELECTROCARDIOGRAM TRACING: CPT

## 2022-02-24 PROCEDURE — 84100 ASSAY OF PHOSPHORUS: CPT

## 2022-02-24 PROCEDURE — 84484 ASSAY OF TROPONIN QUANT: CPT

## 2022-02-24 PROCEDURE — 85025 COMPLETE CBC W/AUTO DIFF WBC: CPT

## 2022-02-24 PROCEDURE — 36415 COLL VENOUS BLD VENIPUNCTURE: CPT

## 2022-02-24 PROCEDURE — 70551 MRI BRAIN STEM W/O DYE: CPT

## 2022-02-24 PROCEDURE — 80053 COMPREHEN METABOLIC PANEL: CPT

## 2022-02-24 PROCEDURE — 93306 TTE W/DOPPLER COMPLETE: CPT

## 2022-02-24 PROCEDURE — 71250 CT THORAX DX C-: CPT | Mod: MA

## 2022-02-24 PROCEDURE — 86341 ISLET CELL ANTIBODY: CPT

## 2022-02-24 PROCEDURE — 82947 ASSAY GLUCOSE BLOOD QUANT: CPT

## 2022-02-24 PROCEDURE — 99239 HOSP IP/OBS DSCHRG MGMT >30: CPT

## 2022-02-24 PROCEDURE — 82962 GLUCOSE BLOOD TEST: CPT

## 2022-02-24 PROCEDURE — 82550 ASSAY OF CK (CPK): CPT

## 2022-02-24 PROCEDURE — 87086 URINE CULTURE/COLONY COUNT: CPT

## 2022-02-24 PROCEDURE — U0005: CPT

## 2022-02-24 PROCEDURE — 84295 ASSAY OF SERUM SODIUM: CPT

## 2022-02-24 PROCEDURE — 83605 ASSAY OF LACTIC ACID: CPT

## 2022-02-24 PROCEDURE — 82330 ASSAY OF CALCIUM: CPT

## 2022-02-24 PROCEDURE — 0225U NFCT DS DNA&RNA 21 SARSCOV2: CPT

## 2022-02-24 PROCEDURE — 84443 ASSAY THYROID STIM HORMONE: CPT

## 2022-02-24 PROCEDURE — 74176 CT ABD & PELVIS W/O CONTRAST: CPT | Mod: MA

## 2022-02-24 PROCEDURE — 84300 ASSAY OF URINE SODIUM: CPT

## 2022-02-24 PROCEDURE — 82803 BLOOD GASES ANY COMBINATION: CPT

## 2022-02-24 PROCEDURE — 97167 OT EVAL HIGH COMPLEX 60 MIN: CPT

## 2022-02-24 PROCEDURE — 70450 CT HEAD/BRAIN W/O DYE: CPT

## 2022-02-24 PROCEDURE — 80048 BASIC METABOLIC PNL TOTAL CA: CPT

## 2022-02-24 PROCEDURE — 76775 US EXAM ABDO BACK WALL LIM: CPT

## 2022-02-24 PROCEDURE — 81003 URINALYSIS AUTO W/O SCOPE: CPT

## 2022-02-24 PROCEDURE — U0003: CPT

## 2022-02-24 PROCEDURE — 83690 ASSAY OF LIPASE: CPT

## 2022-02-24 PROCEDURE — 70498 CT ANGIOGRAPHY NECK: CPT

## 2022-02-24 PROCEDURE — 0042T: CPT

## 2022-02-24 PROCEDURE — 85014 HEMATOCRIT: CPT

## 2022-02-24 PROCEDURE — 86803 HEPATITIS C AB TEST: CPT

## 2022-02-24 PROCEDURE — 83935 ASSAY OF URINE OSMOLALITY: CPT

## 2022-02-24 RX ORDER — ISOPROPYL ALCOHOL, BENZOCAINE .7; .06 ML/ML; ML/ML
0 SWAB TOPICAL
Qty: 150 | Refills: 0
Start: 2022-02-24

## 2022-02-24 RX ORDER — DICLOFENAC SODIUM 75 MG/1
1 TABLET, DELAYED RELEASE ORAL
Qty: 0 | Refills: 0 | DISCHARGE

## 2022-02-24 RX ORDER — FAMOTIDINE 10 MG/ML
1 INJECTION INTRAVENOUS
Qty: 0 | Refills: 0 | DISCHARGE
Start: 2022-02-24

## 2022-02-24 RX ORDER — ASPIRIN/CALCIUM CARB/MAGNESIUM 324 MG
1 TABLET ORAL
Qty: 0 | Refills: 0 | DISCHARGE
Start: 2022-02-24

## 2022-02-24 RX ORDER — ATORVASTATIN CALCIUM 80 MG/1
1 TABLET, FILM COATED ORAL
Qty: 0 | Refills: 0 | DISCHARGE
Start: 2022-02-24

## 2022-02-24 RX ORDER — LOSARTAN/HYDROCHLOROTHIAZIDE 100MG-25MG
1 TABLET ORAL
Qty: 0 | Refills: 0 | DISCHARGE

## 2022-02-24 RX ORDER — SPIRONOLACTONE 25 MG/1
1 TABLET, FILM COATED ORAL
Qty: 0 | Refills: 0 | DISCHARGE

## 2022-02-24 RX ORDER — ASPIRIN/CALCIUM CARB/MAGNESIUM 324 MG
1 TABLET ORAL
Qty: 30 | Refills: 0
Start: 2022-02-24 | End: 2022-03-25

## 2022-02-24 RX ORDER — INSULIN LISPRO 100/ML
20 VIAL (ML) SUBCUTANEOUS
Qty: 30 | Refills: 1
Start: 2022-02-24 | End: 2022-04-24

## 2022-02-24 RX ORDER — ENOXAPARIN SODIUM 100 MG/ML
50 INJECTION SUBCUTANEOUS
Qty: 30 | Refills: 1
Start: 2022-02-24 | End: 2022-04-24

## 2022-02-24 RX ORDER — ATORVASTATIN CALCIUM 80 MG/1
1 TABLET, FILM COATED ORAL
Qty: 30 | Refills: 0
Start: 2022-02-24 | End: 2022-03-25

## 2022-02-24 RX ADMIN — Medication 2: at 17:28

## 2022-02-24 RX ADMIN — Medication 81 MILLIGRAM(S): at 10:52

## 2022-02-24 RX ADMIN — Medication 20 UNIT(S): at 12:35

## 2022-02-24 RX ADMIN — INSULIN GLARGINE 50 UNIT(S): 100 INJECTION, SOLUTION SUBCUTANEOUS at 08:56

## 2022-02-24 RX ADMIN — AMLODIPINE BESYLATE 10 MILLIGRAM(S): 2.5 TABLET ORAL at 04:43

## 2022-02-24 RX ADMIN — Medication 20 UNIT(S): at 08:35

## 2022-02-24 RX ADMIN — FAMOTIDINE 20 MILLIGRAM(S): 10 INJECTION INTRAVENOUS at 04:43

## 2022-02-24 RX ADMIN — CHLORHEXIDINE GLUCONATE 1 APPLICATION(S): 213 SOLUTION TOPICAL at 08:52

## 2022-02-24 RX ADMIN — HEPARIN SODIUM 5000 UNIT(S): 5000 INJECTION INTRAVENOUS; SUBCUTANEOUS at 14:29

## 2022-02-24 RX ADMIN — Medication 4: at 12:35

## 2022-02-24 RX ADMIN — HEPARIN SODIUM 5000 UNIT(S): 5000 INJECTION INTRAVENOUS; SUBCUTANEOUS at 04:44

## 2022-02-24 RX ADMIN — Medication 20 UNIT(S): at 17:28

## 2022-02-24 RX ADMIN — CARVEDILOL PHOSPHATE 6.25 MILLIGRAM(S): 80 CAPSULE, EXTENDED RELEASE ORAL at 10:04

## 2022-02-24 RX ADMIN — Medication 2: at 08:36

## 2022-02-24 NOTE — DISCHARGE NOTE PROVIDER - CARE PROVIDER_API CALL
Shashi Hampton; PhD)  Neurology; Vascular Neurology  [address] [facility] [facility] [address]  [place] [address]  Phone: [phone]  Fax: [phone]  Follow Up Time:     Endocrinologist,   Phone: (   )    -  Fax: (   )    -  Follow Up Time:

## 2022-02-24 NOTE — DISCHARGE NOTE NURSING/CASE MANAGEMENT/SOCIAL WORK - PATIENT PORTAL LINK FT
You can access the FollowMyHealth Patient Portal offered by [facility] by registering at the following website: [email or web address]. By joining [facility]’s FollowMyHealth portal, you will also be able to view your health information using other applications (apps) compatible with our system.

## 2022-02-24 NOTE — PROGRESS NOTE ADULT - PROVIDER SPECIALTY LIST ADULT
Endocrinology
Hospitalist
Neurology
Rehab Medicine
Endocrinology
Endocrinology
Nephrology
Endocrinology
Endocrinology
Hospitalist
Hospitalist
Nephrology

## 2022-02-24 NOTE — PROGRESS NOTE ADULT - ATTENDING COMMENTS
C-peptide low. ALthough this does not rule out underlyiing ketosis prone type 2 diabetes, it does indicate obligate need for inuslin until further data available. Will follow.
61y Female admitted after new onset DM. RRT called due to Right sided parasthesia, concern for TIA.    Seen by Neurology, no acute findings with resolution of neurologic symptoms.  Expect patient to have functional status for discharge to home.
Physical exam reveals marked acanthosis nigricans posterior neck and at thyroidectomy scar. Obesity and high insulin requirement also consistent with insulin resistance. Admission with new onset DKA noted. Pt. may have ketosis prone type 2 diabetes. If c-peptide present and QI negative, may be controllable in future without insulin. Outpatient follow up advised.

## 2022-02-24 NOTE — DISCHARGE NOTE NURSING/CASE MANAGEMENT/SOCIAL WORK - NSDCFUADDAPPT_GEN_ALL_CORE_FT
Endocrine follow up appointment:  [facility] at [place] [address] [address] [facility], 2nd floor  March 15th at 8:00am  Provider: OMER Barrow

## 2022-02-24 NOTE — DISCHARGE NOTE PROVIDER - HOSPITAL COURSE
61 year old female with PMH of HTN, asthma, VINICIUS, remote thyroidectomy, coming to hospital with complaints of dehydration and hypernatremia, found to have new diagnosis of diabetes type 2 w/ dka and admitted to MICU. Pt then was transitioned off Insulin gtt and transferred to floor. On 2/22/22, RRT called as pt reported tingling in right arm and leg with last known normal the previous night about 2200. Neurology evaluation was requested and full CVA work up done. Pt likely had TIA and was started on ASA and statin. Diabetic teaching was done with pt and family. Her Insulin doses were adjusted prior to DC and pt will need follow up with Endocrinology and Neurology.     Imaging:  CT head, CTA head/neck -ve for acute pathology  MRI brain shows no acute infarction   ECHO: Hyperdynamic global left ventricular systolic function with cavity obliteration, no obstruction. Left ventricular ejection fraction, by visual estimation, is >75%, Grade I diastolic dysfunction.    DC diagnosis:  R sided Tingling, likely TIA   Type 2 Diabetes, new onset uncontrolled w/ hyperglycemia  HTN, Essential  GERD   ANTONINA  s/p partial thyroidectomy    Vital Signs Last 24 Hrs  T(C): 36.5 (24 Feb 2022 10:00), Max: 36.9 (23 Feb 2022 16:09)  T(F): 97.7 (24 Feb 2022 10:00), Max: 98.4 (23 Feb 2022 16:09)  HR: 90 (24 Feb 2022 10:00) (82 - 92)  BP: 137/63 (24 Feb 2022 10:00) (123/60 - 152/74)  BP(mean): 86 (24 Feb 2022 04:00) (75 - 94)  RR: 18 (24 Feb 2022 10:00) (16 - 87)  SpO2: 99% (24 Feb 2022 10:00) (97% - 100%)    Physical Exam:  CONSTITUTIONAL: NAD, sitting up in bed  ENMT: Moist oral mucosa, PERRLA, EOMI   RESPIRATORY: Normal respiratory effort; lungs are clear to auscultation bilaterally  CARDIOVASCULAR: Regular rate and rhythm, normal S1 and S2, No lower extremity edema  ABDOMEN: Obese, Nontender to palpation, normoactive bowel sounds  MUSCULOSKELETAL: No clubbing or cyanosis of digits  PSYCH: A+O to person, place, and time; affect appropriate  NEUROLOGY: No gross sensory or motor deficits   SKIN: No rashes; no palpable lesions     DC time: 35 mins

## 2022-02-24 NOTE — ADVANCED PRACTICE NURSE CONSULT - ASSESSMENT
went to see pt in am pt is a+ox3 c/o 0 pain sitting comfortably in gerie chair. pt was never told she has diabetes. pt was educated about diabetes disease process and the importance of using insulin @ this time. pt was educated about the 2 types of insulin she is going home and dis=fferent action and schedule. pt verbalized understanding. pt was educated about the importance of bg monitoring and was advised to check bg before meals parameters provided. also educated about healthy food choices and assisted her to make healthy choices for meals and snacks. the plate method was used to teach portion.spt was educated abut ss of hypoglycmeia and treatment pt verbalized understanding. she was encouraged to ambulate after meals to importve glycemic control. written material about meal plan and diabetes self management provided

## 2022-02-24 NOTE — DISCHARGE NOTE PROVIDER - NSDCMRMEDTOKEN_GEN_ALL_CORE_FT
Admelog 100 units/mL injectable solution: 20 unit(s) injectable 3 times a day (with meals)   1 month supply  Albuterol (Eqv-ProAir HFA) 90 mcg/inh inhalation aerosol: 2 puff(s) inhaled every 6 hours, As Needed  alcohol swabs: Apply topically   alcohol swabs: Apply topically 4 times a day   aspirin 81 mg oral tablet, chewable: 1 tab(s) orally once a day  atorvastatin 40 mg oral tablet: 1 tab(s) orally once a day (at bedtime)  Breo Ellipta 200 mcg-25 mcg/inh inhalation powder: 1 puff(s) inhaled once a day  Coreg 12.5 mg oral tablet: 1 tab(s) orally 2 times a day  LAST FILLED 11/2020  famotidine 20 mg oral tablet: 1 tab(s) orally 2 times a day  Glucometer (per patient&#x27;s insurance): 1 unit(s)  4 times a day   Insulin pen needle: Use with Insulin pen, 4 times a day   Lancets: 1   4 times a day   Lantus Solostar Pen 100 units/mL subcutaneous solution: 50 unit(s) subcutaneous 2 times a day   1 month supply  montelukast 10 mg oral tablet: 1 tab(s) orally once a day (at bedtime)  Norvasc 10 mg oral tablet: 1 tab(s) orally once a day  Oracea 40 mg oral delayed release capsule: 1 cap(s) orally once a day (in the morning) FILLED 1/24/22 FOR 30 DAY SUPPLY

## 2022-02-24 NOTE — PROGRESS NOTE ADULT - SUBJECTIVE AND OBJECTIVE BOX
Patient seen and examined at bedside.  No events overnight.   Denies any new symptoms & has completely resolved the R sided paraesthesias.    Denies any-other complaints at this time.     61yF was admitted on 02-18    Patient is a 61y old  Female who presents with a chief complaint of DKA (21 Feb 2022 15:44)    HPI:  Ms. Flores is a 61 year old Female with a h/o HTN, asthma, VINICIUS, remote thyroidectomy, presents to the ED with a few days of progressive AMS, n/v, abdominal pain, constipation, polydipsia, and polyuria.  BG noted to be elevated to 1302. AG of 30.  She was treated for DKA, aggressively hydrated and started on an insulin infusion. RRT called on 2/22 at 9:30  because of tingling sensation in RUE/RLE.  Neurology consulted and CT head obtained.  She reports the numbness has improved.        Imaging performed:    CT Brain Stroke Protocol (02.22.22) -  Mild chronic microvascular changes without evidence of an   acute transcortical infarction or hemorrhage. Findings discussed with Dr. Connell.    CTA BRAIN (02.22.22) : Mild right cavernous carotid artery calcification. The [place] and vertebrobasilar system are unremarkable without  evidence of stenosis, occlusion or saccular aneurysm dilation. No  evidence for arterial venous malformation. The vertebral arteries are  codominant.    CTA NECK  (02.22.22): A left-sided aortic arch is demonstrated. There is normal relationship to  the great vessels. The common carotid arteries, internal carotid arteries and vertebral arteries are normal in caliber. No evidence of stenosis, occlusion or saccular aneurysm dilation. The vertebral arteries are codominant.    CT PERFUSION (02.22.22): Patient has only had less than 2 hours of symptoms may influence the CT  perfusion.    MR Head No Cont (02.23.22) - No acute infarction.  -------------------------------------------------------------------------------    REVIEW OF SYSTEMS  Constitutional - No fever, No weight loss, No fatigue  HEENT - No eye pain, No visual disturbances, No difficulty hearing, No tinnitus, No vertigo, No neck pain  Respiratory - No cough, No wheezing, No shortness of breath  Cardiovascular - No chest pain, No palpitations  Gastrointestinal - No abdominal pain, No nausea, No vomiting, No diarrhea, No constipation  Genitourinary - No dysuria, No frequency, No hematuria, No incontinence  Neurological - No headaches, No memory loss, No loss of strength, No numbness, No tremors  Skin - No itching, No rashes, No lesions   Endocrine - No temperature intolerance  Musculoskeletal - No joint pain, No joint swelling, No muscle pain  Psychiatric - No depression, No anxiety    ICU Vital Signs Last 24 Hrs  T(C): 36.5 (24 Feb 2022 10:00), Max: 36.9 (23 Feb 2022 16:09)  T(F): 97.7 (24 Feb 2022 10:00), Max: 98.4 (23 Feb 2022 16:09)  HR: 90 (24 Feb 2022 10:00) (82 - 92)  BP: 137/63 (24 Feb 2022 10:00) (123/60 - 152/74)  BP(mean): 86 (24 Feb 2022 04:00) (75 - 94)  ABP: --  ABP(mean): --  RR: 18 (24 Feb 2022 10:00) (16 - 87)  SpO2: 99% (24 Feb 2022 10:00) (97% - 100%)    PAST MEDICAL & SURGICAL HISTORY  Asthma    Hypertension    Sleep apnea    S/P hysterectomy    S/P cholecystectomy    S/P thyroidectomy    SOCIAL HISTORY - as per documentation/history  Smoking - None  EtOH - None  Drugs - None    FUNCTIONAL HISTORY  Previously independent.      CURRENT FUNCTIONAL STATUS    PT - 2/23    ansfer: Sit to Stand:     · Level of [place]	independent    Transfer: Stand to Sit:     · Level of [place]	independent    Gait Skills:     · Level of [place]	independent  · Gait Distance	150 feet    Gait Analysis:     · Gait Pattern Used	2-point gait    Stair Negotiation:     · Level of [place]	independent  · Assistive Device	left rail down; left rail up  · Stair Pattern	step to step  · Number of Stairs	3    OT - 2/23     Bathing Training:     · Level of [place]	independent; sponge bathe    Upper Body Dressing Training:     · Level of [place]	independent    Lower Body Dressing Training:     · Level of [place]	independent    Toilet Hygiene Training:     · Level of [place]	independent    Grooming Training:     · Level of [place]	independent    Eating/Self-Feeding Training:     · Level of [place]	independent      FAMILY HISTORY   No pertinent family history in first degree relatives    RECENT LABS - Reviewed                          11.7   4.16  )-----------( 199      ( 23 Feb 2022 05:59 )             36.4     02-23    138  |  102  |  13.2  ----------------------------<  244<H>  3.5   |  24.0  |  0.63    Ca    8.8      23 Feb 2022 05:59  Phos  3.5     02-23  Mg     1.8     02-23    TPro  6.8  /  Alb  3.3  /  TBili  0.2<L>  /  DBili  x   /  AST  193<H>  /  ALT  94<H>  /  AlkPhos  99  02-22    PT/INR - ( 22 Feb 2022 13:57 )   PT: 12.5 sec;   INR: 1.08 ratio         PTT - ( 22 Feb 2022 13:57 )  PTT:28.1 sec      ALLERGIES  No Known Allergies    MEDICATIONS  (STANDING):  amLODIPine   Tablet 10 milliGRAM(s) Oral daily  aspirin  chewable 81 milliGRAM(s) Oral daily  atorvastatin 40 milliGRAM(s) Oral at bedtime  carvedilol 6.25 milliGRAM(s) Oral every 12 hours  chlorhexidine 4% Liquid 1 Application(s) Topical <User Schedule>  dextrose 40% Gel 15 Gram(s) Oral once  dextrose 5%. 1000 milliLiter(s) (50 mL/Hr) IV Continuous <Continuous>  dextrose 5%. 1000 milliLiter(s) (100 mL/Hr) IV Continuous <Continuous>  dextrose 50% Injectable 25 Gram(s) IV Push once  dextrose 50% Injectable 12.5 Gram(s) IV Push once  dextrose 50% Injectable 25 Gram(s) IV Push once  famotidine    Tablet 20 milliGRAM(s) Oral two times a day  glucagon  Injectable 1 milliGRAM(s) IntraMuscular once  heparin   Injectable 5000 Unit(s) SubCutaneous every 8 hours  insulin glargine Injectable (LANTUS) 50 Unit(s) SubCutaneous every morning  insulin glargine Injectable (LANTUS) 50 Unit(s) SubCutaneous at bedtime  insulin lispro (ADMELOG) corrective regimen sliding scale   SubCutaneous Before meals and at bedtime  insulin lispro Injectable (ADMELOG) 20 Unit(s) SubCutaneous three times a day before meals  sodium chloride 0.45%. 1000 milliLiter(s) (75 mL/Hr) IV Continuous <Continuous>    MEDICATIONS  (PRN):      ----------------------------------------------------------------------------------------  PHYSICAL EXAM  Constitutional - NAD, Comfortable  HEENT - NCAT, EOMI  Chest - Breathing comfortably, No wheezing  Cardiovascular - No cyanosis   Abdomen - Soft   Extremities - No C/C/E, No calf tenderness   Neurologic Exam -                    Cognitive - AAO to self, place, date, year, situation     Communication - Fluent, No dysarthria     Cranial Nerves - CN 2-12 intact     Motor - No focal deficits                    LEFT    UE - ShAB 5/5, EF 5/5, EE 5/5, WE 5/5,  5/5                    RIGHT UE - ShAB 5/5, EF 5/5, EE 5/5, WE 5/5,  5/5                    LEFT    LE - HF 5/5, KE 5/5, DF 5/5, PF 5/5                    RIGHT LE - HF 5/5, KE 5/5, DF 5/5, PF 5/5        Sensory - Intact to LT     OculoVestibular - No nystagmus  Psychiatric - Mood stable, Affect WNL  ----------------------------------------------------------------------------------------  ASSESSMENT/PLAN  61y Female admitted after new onset DM. RRT called due to Right sided parasthesia, concern for TIA.      TIA- ASA & Lipitor. No acute finding on the MRI.   HLD - Lipitor   HTN - Norvasc, Carvedilol  GERD - Famotidine   DM - Lantus, Admelog & ISS   Pain - Tylenol  DVT PPX - SCDs, Heparin     Rehab -Patient is walking 150 feet independently She doesn't have any new focal deficits. Expect  patient to achieve functional status for discharge to home.    Will sign off. Please reconsult as needed.       Patient seen and examined at bedside.  No events overnight.   Denies any new symptoms & has completely resolved the R sided paraesthesias.    Denies any-other complaints at this time.     61yF was admitted on 02-18    Patient is a 61y old  Female who presents with a chief complaint of DKA (21 Feb 2022 15:44)    HPI:  Ms. Flores is a 61 year old Female with a h/o HTN, asthma, VINICIUS, remote thyroidectomy, presents to the ED with a few days of progressive AMS, n/v, abdominal pain, constipation, polydipsia, and polyuria.  BG noted to be elevated to 1302. AG of 30.  She was treated for DKA, aggressively hydrated and started on an insulin infusion. RRT called on 2/22 at 9:30  because of tingling sensation in RUE/RLE.  Neurology consulted and CT head obtained.  She reports the numbness has improved.        Imaging performed:    CT Brain Stroke Protocol (02.22.22) -  Mild chronic microvascular changes without evidence of an   acute transcortical infarction or hemorrhage. Findings discussed with Dr. Connell.    CTA BRAIN (02.22.22) : Mild right cavernous carotid artery calcification. The [place] and vertebrobasilar system are unremarkable without  evidence of stenosis, occlusion or saccular aneurysm dilation. No  evidence for arterial venous malformation. The vertebral arteries are  codominant.    CTA NECK  (02.22.22): A left-sided aortic arch is demonstrated. There is normal relationship to  the great vessels. The common carotid arteries, internal carotid arteries and vertebral arteries are normal in caliber. No evidence of stenosis, occlusion or saccular aneurysm dilation. The vertebral arteries are codominant.    CT PERFUSION (02.22.22): Patient has only had less than 2 hours of symptoms may influence the CT  perfusion.    MR Head No Cont (02.23.22) - No acute infarction.  -------------------------------------------------------------------------------    REVIEW OF SYSTEMS  Constitutional - No fever, No weight loss, No fatigue  HEENT - No eye pain, No visual disturbances, No difficulty hearing, No tinnitus, No vertigo, No neck pain  Respiratory - No cough, No wheezing, No shortness of breath  Cardiovascular - No chest pain, No palpitations  Gastrointestinal - No abdominal pain, No nausea, No vomiting, No diarrhea, No constipation  Genitourinary - No dysuria, No frequency, No hematuria, No incontinence  Neurological - No headaches, No memory loss, No loss of strength, No numbness, No tremors  Skin - No itching, No rashes, No lesions   Endocrine - No temperature intolerance  Musculoskeletal - No joint pain, No joint swelling, No muscle pain  Psychiatric - No depression, No anxiety    ICU Vital Signs Last 24 Hrs  T(C): 36.5 (24 Feb 2022 10:00), Max: 36.9 (23 Feb 2022 16:09)  T(F): 97.7 (24 Feb 2022 10:00), Max: 98.4 (23 Feb 2022 16:09)  HR: 90 (24 Feb 2022 10:00) (82 - 92)  BP: 137/63 (24 Feb 2022 10:00) (123/60 - 152/74)  BP(mean): 86 (24 Feb 2022 04:00) (75 - 94)  ABP: --  ABP(mean): --  RR: 18 (24 Feb 2022 10:00) (16 - 87)  SpO2: 99% (24 Feb 2022 10:00) (97% - 100%)    PAST MEDICAL & SURGICAL HISTORY  Asthma    Hypertension    Sleep apnea    S/P hysterectomy    S/P cholecystectomy    S/P thyroidectomy    SOCIAL HISTORY - as per documentation/history  Smoking - None  EtOH - None  Drugs - None    FUNCTIONAL HISTORY  Previously independent.      CURRENT FUNCTIONAL STATUS    PT - 2/23    ansfer: Sit to Stand:     · Level of [place]	independent    Transfer: Stand to Sit:     · Level of [place]	independent    Gait Skills:     · Level of [place]	independent  · Gait Distance	150 feet    Gait Analysis:     · Gait Pattern Used	2-point gait    Stair Negotiation:     · Level of [place]	independent  · Assistive Device	left rail down; left rail up  · Stair Pattern	step to step  · Number of Stairs	3    OT - 2/23     Bathing Training:     · Level of [place]	independent; sponge bathe    Upper Body Dressing Training:     · Level of [place]	independent    Lower Body Dressing Training:     · Level of [place]	independent    Toilet Hygiene Training:     · Level of [place]	independent    Grooming Training:     · Level of [place]	independent    Eating/Self-Feeding Training:     · Level of [place]	independent      FAMILY HISTORY   No pertinent family history in first degree relatives    RECENT LABS - Reviewed                          11.7   4.16  )-----------( 199      ( 23 Feb 2022 05:59 )             36.4     02-23    138  |  102  |  13.2  ----------------------------<  244<H>  3.5   |  24.0  |  0.63    Ca    8.8      23 Feb 2022 05:59  Phos  3.5     02-23  Mg     1.8     02-23    TPro  6.8  /  Alb  3.3  /  TBili  0.2<L>  /  DBili  x   /  AST  193<H>  /  ALT  94<H>  /  AlkPhos  99  02-22    PT/INR - ( 22 Feb 2022 13:57 )   PT: 12.5 sec;   INR: 1.08 ratio         PTT - ( 22 Feb 2022 13:57 )  PTT:28.1 sec      ALLERGIES  No Known Allergies    MEDICATIONS  (STANDING):  amLODIPine   Tablet 10 milliGRAM(s) Oral daily  aspirin  chewable 81 milliGRAM(s) Oral daily  atorvastatin 40 milliGRAM(s) Oral at bedtime  carvedilol 6.25 milliGRAM(s) Oral every 12 hours  chlorhexidine 4% Liquid 1 Application(s) Topical <User Schedule>  dextrose 40% Gel 15 Gram(s) Oral once  dextrose 5%. 1000 milliLiter(s) (50 mL/Hr) IV Continuous <Continuous>  dextrose 5%. 1000 milliLiter(s) (100 mL/Hr) IV Continuous <Continuous>  dextrose 50% Injectable 25 Gram(s) IV Push once  dextrose 50% Injectable 12.5 Gram(s) IV Push once  dextrose 50% Injectable 25 Gram(s) IV Push once  famotidine    Tablet 20 milliGRAM(s) Oral two times a day  glucagon  Injectable 1 milliGRAM(s) IntraMuscular once  heparin   Injectable 5000 Unit(s) SubCutaneous every 8 hours  insulin glargine Injectable (LANTUS) 50 Unit(s) SubCutaneous every morning  insulin glargine Injectable (LANTUS) 50 Unit(s) SubCutaneous at bedtime  insulin lispro (ADMELOG) corrective regimen sliding scale   SubCutaneous Before meals and at bedtime  insulin lispro Injectable (ADMELOG) 20 Unit(s) SubCutaneous three times a day before meals  sodium chloride 0.45%. 1000 milliLiter(s) (75 mL/Hr) IV Continuous <Continuous>    MEDICATIONS  (PRN):      ----------------------------------------------------------------------------------------  PHYSICAL EXAM  Constitutional - NAD, Comfortable  HEENT - NCAT, EOMI  Chest - Breathing comfortably, No wheezing  Cardiovascular - No cyanosis   Abdomen - Soft   Extremities - No C/C/E, No calf tenderness   Neurologic Exam -                    Cognitive - AAO to self, place, date, year, situation     Communication - Fluent, No dysarthria     Cranial Nerves - CN 2-12 intact     Motor - No focal deficits                    LEFT    UE - ShAB 5/5, EF 5/5, EE 5/5, WE 5/5,  5/5                    RIGHT UE - ShAB 5/5, EF 5/5, EE 5/5, WE 5/5,  5/5                    LEFT    LE - HF 5/5, KE 5/5, DF 5/5, PF 5/5                    RIGHT LE - HF 5/5, KE 5/5, DF 5/5, PF 5/5        Sensory - Intact to LT     OculoVestibular - No nystagmus  Psychiatric - Mood stable, Affect WNL  ----------------------------------------------------------------------------------------  ASSESSMENT/PLAN  61y Female admitted after new onset DM. RRT called due to Right sided parasthesia, concern for TIA.      TIA- ASA & Lipitor. No acute finding on the MRI.   HLD - Lipitor   HTN - Norvasc, Carvedilol  GERD - Famotidine   DM - Lantus, Admelog & ISS   Pain - Tylenol  DVT PPX - SCDs, Heparin     Rehab -Patient is walking 150 feet independently She doesn't have any new focal deficits. Expect  patient to achieve functional status for discharge to home.       Patient seen and examined at bedside.  Denies any new symptoms & has completely resolved the R sided paraesthesias.    Denies any-other complaints at this time.     Imaging performed:    CT Brain Stroke Protocol (02.22.22) -  Mild chronic microvascular changes without evidence of an   acute transcortical infarction or hemorrhage. Findings discussed with Dr. Connell.    CTA BRAIN (02.22.22) : Mild right cavernous carotid artery calcification. The [place] and vertebrobasilar system are unremarkable without  evidence of stenosis, occlusion or saccular aneurysm dilation. No  evidence for arterial venous malformation. The vertebral arteries are  codominant.    CTA NECK  (02.22.22): A left-sided aortic arch is demonstrated. There is normal relationship to  the great vessels. The common carotid arteries, internal carotid arteries and vertebral arteries are normal in caliber. No evidence of stenosis, occlusion or saccular aneurysm dilation. The vertebral arteries are codominant.    CT PERFUSION (02.22.22): Patient has only had less than 2 hours of symptoms may influence the CT  perfusion.    MR Head No Cont (02.23.22) - No acute infarction.  -------------------------------------------------------------------------------    REVIEW OF SYSTEMS  Constitutional - No fever, No weight loss, No fatigue  HEENT - No eye pain, No visual disturbances, No difficulty hearing, No tinnitus, No vertigo, No neck pain  Respiratory - No cough, No wheezing, No shortness of breath  Cardiovascular - No chest pain, No palpitations  Gastrointestinal - No abdominal pain, No nausea, No vomiting, No diarrhea, No constipation  Genitourinary - No dysuria, No frequency, No hematuria, No incontinence  Neurological - No headaches, No memory loss, No loss of strength, No numbness, No tremors  Skin - No itching, No rashes, No lesions   Endocrine - No temperature intolerance  Musculoskeletal - No joint pain, No joint swelling, No muscle pain  Psychiatric - No depression, No anxiety    ICU Vital Signs Last 24 Hrs  T(C): 36.5 (24 Feb 2022 10:00), Max: 36.9 (23 Feb 2022 16:09)  T(F): 97.7 (24 Feb 2022 10:00), Max: 98.4 (23 Feb 2022 16:09)  HR: 90 (24 Feb 2022 10:00) (82 - 92)  BP: 137/63 (24 Feb 2022 10:00) (123/60 - 152/74)  BP(mean): 86 (24 Feb 2022 04:00) (75 - 94)  ABP: --  ABP(mean): --  RR: 18 (24 Feb 2022 10:00) (16 - 87)  SpO2: 99% (24 Feb 2022 10:00) (97% - 100%)    PAST MEDICAL & SURGICAL HISTORY  Asthma    Hypertension    Sleep apnea    S/P hysterectomy    S/P cholecystectomy    S/P thyroidectomy    SOCIAL HISTORY - as per documentation/history  Smoking - None  EtOH - None  Drugs - None    FUNCTIONAL HISTORY  Previously independent.      CURRENT FUNCTIONAL STATUS    PT - 2/23    ansfer: Sit to Stand:     · Level of [place]	independent    Transfer: Stand to Sit:     · Level of [place]	independent    Gait Skills:     · Level of [place]	independent  · Gait Distance	150 feet    Gait Analysis:     · Gait Pattern Used	2-point gait    Stair Negotiation:     · Level of [place]	independent  · Assistive Device	left rail down; left rail up  · Stair Pattern	step to step  · Number of Stairs	3    OT - 2/23     Bathing Training:     · Level of [place]	independent; sponge bathe    Upper Body Dressing Training:     · Level of [place]	independent    Lower Body Dressing Training:     · Level of [place]	independent    Toilet Hygiene Training:     · Level of [place]	independent    Grooming Training:     · Level of [place]	independent    Eating/Self-Feeding Training:     · Level of [place]	independent      FAMILY HISTORY   No pertinent family history in first degree relatives    RECENT LABS - Reviewed                          11.7   4.16  )-----------( 199      ( 23 Feb 2022 05:59 )             36.4     02-23    138  |  102  |  13.2  ----------------------------<  244<H>  3.5   |  24.0  |  0.63    Ca    8.8      23 Feb 2022 05:59  Phos  3.5     02-23  Mg     1.8     02-23    TPro  6.8  /  Alb  3.3  /  TBili  0.2<L>  /  DBili  x   /  AST  193<H>  /  ALT  94<H>  /  AlkPhos  99  02-22    PT/INR - ( 22 Feb 2022 13:57 )   PT: 12.5 sec;   INR: 1.08 ratio         PTT - ( 22 Feb 2022 13:57 )  PTT:28.1 sec      ALLERGIES  No Known Allergies    MEDICATIONS  (STANDING):  amLODIPine   Tablet 10 milliGRAM(s) Oral daily  aspirin  chewable 81 milliGRAM(s) Oral daily  atorvastatin 40 milliGRAM(s) Oral at bedtime  carvedilol 6.25 milliGRAM(s) Oral every 12 hours  chlorhexidine 4% Liquid 1 Application(s) Topical <User Schedule>  dextrose 40% Gel 15 Gram(s) Oral once  dextrose 5%. 1000 milliLiter(s) (50 mL/Hr) IV Continuous <Continuous>  dextrose 5%. 1000 milliLiter(s) (100 mL/Hr) IV Continuous <Continuous>  dextrose 50% Injectable 25 Gram(s) IV Push once  dextrose 50% Injectable 12.5 Gram(s) IV Push once  dextrose 50% Injectable 25 Gram(s) IV Push once  famotidine    Tablet 20 milliGRAM(s) Oral two times a day  glucagon  Injectable 1 milliGRAM(s) IntraMuscular once  heparin   Injectable 5000 Unit(s) SubCutaneous every 8 hours  insulin glargine Injectable (LANTUS) 50 Unit(s) SubCutaneous every morning  insulin glargine Injectable (LANTUS) 50 Unit(s) SubCutaneous at bedtime  insulin lispro (ADMELOG) corrective regimen sliding scale   SubCutaneous Before meals and at bedtime  insulin lispro Injectable (ADMELOG) 20 Unit(s) SubCutaneous three times a day before meals  sodium chloride 0.45%. 1000 milliLiter(s) (75 mL/Hr) IV Continuous <Continuous>    MEDICATIONS  (PRN):      ----------------------------------------------------------------------------------------  PHYSICAL EXAM  Constitutional - NAD, Comfortable  HEENT - NCAT, EOMI  Chest - Breathing comfortably, No wheezing  Cardiovascular - No cyanosis   Abdomen - Soft   Extremities - No C/C/E, No calf tenderness   Neurologic Exam -                    Cognitive - AAO to self, place, date, year, situation     Communication - Fluent, No dysarthria     Cranial Nerves - CN 2-12 intact     Motor - No focal deficits                    LEFT    UE - ShAB 5/5, EF 5/5, EE 5/5, WE 5/5,  5/5                    RIGHT UE - ShAB 5/5, EF 5/5, EE 5/5, WE 5/5,  5/5                    LEFT    LE - HF 5/5, KE 5/5, DF 5/5, PF 5/5                    RIGHT LE - HF 5/5, KE 5/5, DF 5/5, PF 5/5        Sensory - Intact to LT     OculoVestibular - No nystagmus  Psychiatric - Mood stable, Affect WNL  ----------------------------------------------------------------------------------------  ASSESSMENT/PLAN  61y Female admitted after new onset DM. RRT called due to Right sided parasthesia, concern for TIA.      TIA- ASA & Lipitor. No acute infarction on MRI.    HLD - Lipitor   HTN - Norvasc, Carvedilol  GERD - Famotidine   DM - Lantus, Admelog & ISS   Pain - Tylenol  DVT PPX - SCDs, Heparin   Rehab -Patient is walking 150 feet independently She doesn't have any new focal deficits. Expect patient to achieve functional status for discharge to home.

## 2022-02-24 NOTE — DISCHARGE NOTE NURSING/CASE MANAGEMENT/SOCIAL WORK - [id number]_ALL_CORE
For information on Fall & Injury Prevention, visit: [email or web address]  [email or web address] OR  https://www.cdc.gov/steadi/patient.html

## 2022-02-24 NOTE — PROGRESS NOTE ADULT - SUBJECTIVE AND OBJECTIVE BOX
No overnight events, pt feels much better today. Plan for d.c today.  Pt mentioned she was upset that her blood sugars are still trending high. Explained to pt that this is a long journey to having blood sugars that are consistently at goal, and that at this time she is safe and improving with plans for follow up outpatient.  follow up on T2DM/partial thyroidectomy (normal TSH)    patient seen and examined at bedside.  REVIEW OF SYSTEMS:    CONSTITUTIONAL: No fever, weight loss, or fatigue  EYES: No eye pain, visual disturbances, or discharge  ENMT:  No difficulty hearing, tinnitus, vertigo; No sinus or throat pain  NECK: No pain or stiffness  RESPIRATORY: No cough, wheezing, chills or hemoptysis; No shortness of breath  CARDIOVASCULAR: No chest pain, palpitations, dizziness, or leg swelling  GASTROINTESTINAL: No abdominal or epigastric pain. No nausea, vomiting, or hematemesis; No diarrhea or constipation. No melena or hematochezia.  NEUROLOGICAL: No headaches, memory loss, loss of strength, numbness, or tremors  SKIN: No itching, burning, rashes, or lesions   MUSCULOSKELETAL: No joint pain or swelling; No muscle, back, or extremity pain  PSYCHIATRIC: No depression, anxiety, mood swings, or difficulty sleeping        No Known Allergies      MEDICATIONS  (STANDING):  amLODIPine   Tablet 10 milliGRAM(s) Oral daily  aspirin  chewable 81 milliGRAM(s) Oral daily  atorvastatin 40 milliGRAM(s) Oral at bedtime  carvedilol 6.25 milliGRAM(s) Oral every 12 hours  chlorhexidine 4% Liquid 1 Application(s) Topical <User Schedule>  dextrose 40% Gel 15 Gram(s) Oral once  dextrose 5%. 1000 milliLiter(s) (50 mL/Hr) IV Continuous <Continuous>  dextrose 5%. 1000 milliLiter(s) (100 mL/Hr) IV Continuous <Continuous>  dextrose 50% Injectable 25 Gram(s) IV Push once  dextrose 50% Injectable 12.5 Gram(s) IV Push once  dextrose 50% Injectable 25 Gram(s) IV Push once  famotidine    Tablet 20 milliGRAM(s) Oral two times a day  glucagon  Injectable 1 milliGRAM(s) IntraMuscular once  heparin   Injectable 5000 Unit(s) SubCutaneous every 8 hours  insulin glargine Injectable (LANTUS) 50 Unit(s) SubCutaneous at bedtime  insulin glargine Injectable (LANTUS) 50 Unit(s) SubCutaneous every morning  insulin lispro (ADMELOG) corrective regimen sliding scale   SubCutaneous Before meals and at bedtime  insulin lispro Injectable (ADMELOG) 20 Unit(s) SubCutaneous three times a day before meals  sodium chloride 0.45%. 1000 milliLiter(s) (75 mL/Hr) IV Continuous <Continuous>    MEDICATIONS  (PRN):      Vital Signs Last 24 Hrs  T(C): 36.5 (24 Feb 2022 10:00), Max: 36.9 (23 Feb 2022 16:09)  T(F): 97.7 (24 Feb 2022 10:00), Max: 98.4 (23 Feb 2022 16:09)  HR: 90 (24 Feb 2022 10:00) (82 - 92)  BP: 137/63 (24 Feb 2022 10:00) (123/60 - 152/74)  BP(mean): 86 (24 Feb 2022 04:00) (75 - 94)  RR: 18 (24 Feb 2022 10:00) (16 - 87)  SpO2: 99% (24 Feb 2022 10:00) (97% - 100%)  Weight (kg): 103.7 (02-18-22 @ 03:33)    Physical Exam:    Constitutional: NAD, well-developed  HEENT: EOMI, no exophalmos  Neck: trachea midline, no thyroid enlargement  Respiratory: CTAB, normal respirations  Cardiovascular: S1 and S2, RRR  Gastrointestinal: BS+, soft, ntnd  Extremities: No peripheral edema  Neurological: AOx3, no focal deficits  Psychiatric: Normal mood and normal affect  Skin: no rashes, no acanthosis    LABS  02-23    138  |  102  |  13.2  ----------------------------<  244<H>  3.5   |  24.0  |  0.63    Ca    8.8      23 Feb 2022 05:59  Phos  3.5     02-23  Mg     1.8     02-23    TPro  6.8  /  Alb  3.3  /  TBili  0.2<L>  /  DBili  x   /  AST  193<H>  /  ALT  94<H>  /  AlkPhos  99  02-22                          11.7   4.16  )-----------( 199      ( 23 Feb 2022 05:59 )             36.4     HDL Cholesterol, Serum: 39 mg/dL (02-23-22 @ 05:59)  Cholesterol, Serum: 152 mg/dL (02-23-22 @ 05:59)  Triglycerides, Serum: 136 mg/dL (02-23-22 @ 05:59)    CT Abdomen and Pelvis No Cont:   ACC: [id number] EXAM:  CT ABDOMEN AND PELVIS                        ACC: [id number] EXAM:  CT CHEST                          PROCEDURE DATE:  02/18/2022          INTERPRETATION:  CLINICAL INFORMATION: patient brought in by daughter   states that she "is not feeling well" complaining of abdominal pain N/V   not eating    COMPARISON: Chest CT 8/2/2016    CONTRAST/COMPLICATIONS:  IV Contrast: NONE  Oral Contrast: NONE  Complications: None reported at time of study completion    PROCEDURE:  CT of the Chest, abdomen and pelvis were performed.  Sagittal and coronal reformats were performed.    FINDINGS:    CHEST:  Suboptimal study due to patient positioning with arms at the side which   causes streak artifact. Limited evaluation of the vascular structures and   soft tissues without IV contrast.    LUNGS AND AIRWAYS: Patent central airways.  Lungs are clear.  PLEURA: No pleural effusion. No pneumothorax.  MEDIASTINUM AND PRESTON: No lymphadenopathy.  VESSELS: Within normal limits.  HEART: Heart size is normal. No pericardial effusion.  CHEST WALL AND LOWER NECK: Visualized proximal right upper extremity   demonstrates nonspecific subcutaneous edema with fat stranding and   subfascial fluid with foci of air.  VISUALIZED UPPER ABDOMEN: Within normal limits.  BONES: Within normal limits.    ABDOMEN/PELVIS:  LIVER: Within normal limits.  BILE DUCTS: Normal caliber.  GALLBLADDER: Cholecystectomy.  SPLEEN: Within normal limits.  PANCREAS: Within normal limits.  ADRENALS: Within normal limits.  KIDNEYS/URETERS: No hydronephrosis or perinephric stranding.    BLADDER: Within normal limits.  REPRODUCTIVE ORGANS: Hysterectomy.    BOWEL: No bowel obstruction. Appendix is normal. Descending and sigmoid   colon diverticulosis. No evidence of diverticulitis. Small hiatal hernia.  PERITONEUM: No ascites.  VESSELS: Within normal limits.  RETROPERITONEUM/LYMPH NODES: No lymphadenopathy.  ABDOMINAL WALL: Within normal limits.  BONES: Within normal limits.    IMPRESSION:  No acute finding in the chest, abdomen or pelvis to explain patient's   symptomatology.    Subcutaneous edema and subfascial fluid with foci of subcutaneous air in   the visualized proximal right upper extremity. Please correlate   clinically for recent intervention versus cellulitis.    --- End of Report ---            NAMAN BROUSSARD MD; Attending Radiologist  This document has been electronically signed. Feb 18 2022  6:00AM (02-18-22 @ 03:09)  Thyroid Stimulating Hormone, Serum: 0.86 uIU/mL (02-17-22 @ 23:35)  A1C with Estimated Average Glucose Result: 14.7 % (02-17-22 @ 23:35)    Alkaline Phosphatase, Serum: 99 U/L (02-22-22 @ 13:57)  Albumin, Serum: 3.3 g/dL (02-22-22 @ 13:57)  Aspartate Aminotransferase (AST/SGOT): 193 U/L (02-22-22 @ 13:57)  Alanine Aminotransferase (ALT/SGPT): 94 U/L (02-22-22 @ 13:57)          CAPILLARY BLOOD GLUCOSE      POCT Blood Glucose.: 235 mg/dL (24 Feb 2022 12:15)  POCT Blood Glucose.: 168 mg/dL (24 Feb 2022 08:07)  POCT Blood Glucose.: 224 mg/dL (23 Feb 2022 21:06)  POCT Blood Glucose.: 235 mg/dL (23 Feb 2022 17:01)

## 2022-02-24 NOTE — DISCHARGE NOTE PROVIDER - NSDCFUADDAPPT_GEN_ALL_CORE_FT
Endocrine follow up appointment:  [facility] at [place] [address] [facility], 2nd floor  March 15th at 8:00am  Provider: OMER Barrow

## 2022-02-24 NOTE — ADVANCED PRACTICE NURSE CONSULT - RECOMMEDATIONS
continue diabetes self management education  pt to inject all insulin doses while in the hospital using prefilled insulin syringe and rn supervision  insulin pen teaching  glucose meter teaching  pt has a fu appointment w dr. ulrich's office on march 15

## 2022-02-24 NOTE — CHART NOTE - NSCHARTNOTEFT_GEN_A_CORE
RN called me to converse with [facility] pharmacy @ [phone] to add lancets, test strips and Insulin syringes to med D/C orders. Patient refused to leave hospital without same.  I called [facility] and added the above. 100 Freestyle test strips with 2 refills and Insulin syringes 100 with 2 refills. Patient is agreeing to be discharged from hospital now 4-t. RN called me to converse with [facility] pharmacy @ [phone] to add lancets, test strips and Insulin syringes to med d/c orders. Patient refused to leave hospital without same.  I called [facility] and added the followin Freestyle test strips with 2 refills, Insulin syringes 100 with 2 refills, also lancets.  Patient is agreeing to be discharged from hospital now 4-t.

## 2022-02-24 NOTE — DISCHARGE NOTE PROVIDER - NSDCCPCAREPLAN_GEN_ALL_CORE_FT
PRINCIPAL DISCHARGE DIAGNOSIS  Diagnosis: DM (diabetes mellitus)  Assessment and Plan of Treatment: Please take your Insulin as directed  Lantus 50 units in the morning and 50 units before bedtime   Admelog 20 units before meals 3 times a day   Check your blood sugar 4 times a day and keep a record   Follow up with PCP and Endocrinologist      SECONDARY DISCHARGE DIAGNOSES  Diagnosis: TIA (transient ischemic attack)  Assessment and Plan of Treatment: You will need follow up with Neurology

## 2022-03-03 LAB — GAD65 AB SER-MCNC: 0 NMOL/L — SIGNIFICANT CHANGE UP

## 2022-03-15 ENCOUNTER — APPOINTMENT (OUTPATIENT)
Dept: ENDOCRINOLOGY | Facility: CLINIC | Age: 62
End: 2022-03-15
Payer: COMMERCIAL

## 2022-03-15 VITALS — BODY MASS INDEX: 39.37 KG/M2 | HEIGHT: 66 IN | WEIGHT: 245 LBS

## 2022-03-15 VITALS — SYSTOLIC BLOOD PRESSURE: 148 MMHG | DIASTOLIC BLOOD PRESSURE: 92 MMHG

## 2022-03-15 LAB — GLUCOSE BLDC GLUCOMTR-MCNC: 100

## 2022-03-15 PROCEDURE — 99072 ADDL SUPL MATRL&STAF TM PHE: CPT

## 2022-03-15 PROCEDURE — 82962 GLUCOSE BLOOD TEST: CPT

## 2022-03-15 PROCEDURE — 99214 OFFICE O/P EST MOD 30 MIN: CPT | Mod: 25

## 2022-03-15 NOTE — PHYSICAL EXAM
[Alert] : alert [Well Nourished] : well nourished [No Acute Distress] : no acute distress [Normal Sclera/Conjunctiva] : normal sclera/conjunctiva [Normal Hearing] : hearing was normal [No Neck Mass] : no neck mass was observed [Well Healed Scar] : well healed scar [No Accessory Muscle Use] : no accessory muscle use [Clear to Auscultation] : lungs were clear to auscultation bilaterally [Normal S1, S2] : normal S1 and S2 [Normal Rate] : heart rate was normal [No Edema] : no peripheral edema [Not Tender] : non-tender [Soft] : abdomen soft [Normal Gait] : normal gait [No Rash] : no rash [No Tremors] : no tremors [Oriented x3] : oriented to person, place, and time
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
## 2022-03-16 NOTE — ASSESSMENT
[FreeTextEntry1] : Pt seen by both OMER Dennis and MD Dominguez\par \par T2DM\par -Pt doing extremely well! Will continue to titrate insulin as glucose levels continue to improve. Will likely be able to add oral medications/GLP1 in future\par -For now, decrease Lantus to 45 units BID and Ademlog to 18 units TID with meals\par -Continue to FS 3x a day- will faciliate julián 2 order\par -Continue to watch diet, pt admits she would like further education on diabetic diet, CDE apt to be made\par -Continue to exercise as tolerated, goal of 30 mins a day 5 x a week\par -Continue to follow up with all specialists including ophtho \par \par \par Partial thyroidectomy\par -Continue to monitor TFTs, on no medication for now\par -Would likely be a canidate for annual sonograms, will discuss at next visit\par \par \par HTN\par -BP slightly elevated in office, continue regimen, will continue to monitor \par \par \par Vit D Def\par -Will check levels with next labs, continue daily supplement\par \par RTO ASAP CDE, 6 weeks NP\par \par MD addendum\par Pt. seen, chart reviewed. ALthough c-peptide low in hospital, excellent glucose pattern suggests at least some recovery of insulin secretory capacity. Begin gradual insulin reduction.

## 2022-03-16 NOTE — REVIEW OF SYSTEMS
[Visual Field Defect] : visual field defect [Polyuria] : polyuria [Fatigue] : no fatigue [Recent Weight Gain (___ Lbs)] : no recent weight gain [Recent Weight Loss (___ Lbs)] : no recent weight loss [Chest Pain] : no chest pain [Shortness Of Breath] : no shortness of breath [Constipation] : no constipation [Diarrhea] : no diarrhea [Polydipsia] : no polydipsia [FreeTextEntry8] : drinking a lot of water

## 2022-04-14 ENCOUNTER — APPOINTMENT (OUTPATIENT)
Dept: ENDOCRINOLOGY | Facility: CLINIC | Age: 62
End: 2022-04-14
Payer: COMMERCIAL

## 2022-04-14 PROCEDURE — G0108 DIAB MANAGE TRN  PER INDIV: CPT

## 2022-04-14 PROCEDURE — 95249 CONT GLUC MNTR PT PROV EQP: CPT

## 2022-04-28 ENCOUNTER — NON-APPOINTMENT (OUTPATIENT)
Age: 62
End: 2022-04-28

## 2022-05-11 ENCOUNTER — APPOINTMENT (OUTPATIENT)
Dept: ENDOCRINOLOGY | Facility: CLINIC | Age: 62
End: 2022-05-11
Payer: COMMERCIAL

## 2022-05-11 VITALS
WEIGHT: 240 LBS | HEIGHT: 66 IN | DIASTOLIC BLOOD PRESSURE: 90 MMHG | BODY MASS INDEX: 38.57 KG/M2 | SYSTOLIC BLOOD PRESSURE: 142 MMHG

## 2022-05-11 PROCEDURE — 99214 OFFICE O/P EST MOD 30 MIN: CPT | Mod: 25

## 2022-05-11 PROCEDURE — 95251 CONT GLUC MNTR ANALYSIS I&R: CPT

## 2022-05-11 NOTE — ASSESSMENT
[FreeTextEntry1] : \par T2DM\par -Pt doing extremely well! Will continue to titrate insulin as glucose levels continue to improve. \par -Begin  mg ER daily with largest meal of day \par -For now, continue Lantus to 45 units BID and decrease Ademlog to 10 units TID with meals\par -Continue Claus use- new sensor placed today \par -Continue to watch diet\par -Continue to exercise as tolerated, goal of 30 mins a day 5 x a week\par -Continue to follow up with all specialists including ophtho \par \par \par Partial thyroidectomy\par -Continue to monitor TFTs, on no medication for now\par -RX given for annual thyroid sonogram (been many years since she had one)\par \par \par HTN\par -BP slightly elevated in office, continue regimen, will continue to monitor \par \par \par Vit D Def\par -Will check levels with next labs, continue daily supplement\par \par RTO 6-8 weeks NP

## 2022-05-11 NOTE — REVIEW OF SYSTEMS
[Recent Weight Loss (___ Lbs)] : recent weight loss: [unfilled] lbs [Fatigue] : no fatigue [Recent Weight Gain (___ Lbs)] : no recent weight gain [Dysphagia] : no dysphagia [Neck Pain] : no neck pain [Dysphonia] : no dysphonia [Chest Pain] : no chest pain [Shortness Of Breath] : no shortness of breath [Constipation] : no constipation [Diarrhea] : no diarrhea [Polyuria] : no polyuria [Polydipsia] : no polydipsia
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
## 2022-05-18 ENCOUNTER — APPOINTMENT (OUTPATIENT)
Dept: ULTRASOUND IMAGING | Facility: CLINIC | Age: 62
End: 2022-05-18
Payer: COMMERCIAL

## 2022-05-18 ENCOUNTER — OUTPATIENT (OUTPATIENT)
Dept: OUTPATIENT SERVICES | Facility: HOSPITAL | Age: 62
LOS: 1 days | End: 2022-05-18
Payer: COMMERCIAL

## 2022-05-18 DIAGNOSIS — Z90.49 ACQUIRED ABSENCE OF OTHER SPECIFIED PARTS OF DIGESTIVE TRACT: Chronic | ICD-10-CM

## 2022-05-18 DIAGNOSIS — E89.0 POSTPROCEDURAL HYPOTHYROIDISM: ICD-10-CM

## 2022-05-18 DIAGNOSIS — E55.9 VITAMIN D DEFICIENCY, UNSPECIFIED: ICD-10-CM

## 2022-05-18 DIAGNOSIS — Z98.89 OTHER SPECIFIED POSTPROCEDURAL STATES: Chronic | ICD-10-CM

## 2022-05-18 DIAGNOSIS — Z00.8 ENCOUNTER FOR OTHER GENERAL EXAMINATION: ICD-10-CM

## 2022-05-18 DIAGNOSIS — E11.9 TYPE 2 DIABETES MELLITUS WITHOUT COMPLICATIONS: ICD-10-CM

## 2022-05-18 DIAGNOSIS — Z90.710 ACQUIRED ABSENCE OF BOTH CERVIX AND UTERUS: Chronic | ICD-10-CM

## 2022-05-18 PROCEDURE — 76536 US EXAM OF HEAD AND NECK: CPT | Mod: 26

## 2022-05-18 PROCEDURE — 76536 US EXAM OF HEAD AND NECK: CPT

## 2022-07-12 LAB
25(OH)D3 SERPL-MCNC: 47.4 NG/ML
ALBUMIN SERPL ELPH-MCNC: 4.2 G/DL
ALP BLD-CCNC: 101 U/L
ALT SERPL-CCNC: 12 U/L
ANION GAP SERPL CALC-SCNC: 14 MMOL/L
AST SERPL-CCNC: 15 U/L
BASOPHILS # BLD AUTO: 0.04 K/UL
BASOPHILS NFR BLD AUTO: 0.6 %
BILIRUB SERPL-MCNC: <0.2 MG/DL
BUN SERPL-MCNC: 17 MG/DL
CALCIUM SERPL-MCNC: 9.3 MG/DL
CHLORIDE SERPL-SCNC: 104 MMOL/L
CHOLEST SERPL-MCNC: 183 MG/DL
CO2 SERPL-SCNC: 23 MMOL/L
CREAT SERPL-MCNC: 0.82 MG/DL
CREAT SPEC-SCNC: 119 MG/DL
EGFR: 80 ML/MIN/1.73M2
EOSINOPHIL # BLD AUTO: 0.32 K/UL
EOSINOPHIL NFR BLD AUTO: 4.5 %
ESTIMATED AVERAGE GLUCOSE: 134 MG/DL
GLUCOSE SERPL-MCNC: 110 MG/DL
HBA1C MFR BLD HPLC: 6.3 %
HCT VFR BLD CALC: 39.9 %
HDLC SERPL-MCNC: 52 MG/DL
HGB BLD-MCNC: 12.2 G/DL
IMM GRANULOCYTES NFR BLD AUTO: 0.1 %
LDLC SERPL CALC-MCNC: 119 MG/DL
LYMPHOCYTES # BLD AUTO: 2.75 K/UL
LYMPHOCYTES NFR BLD AUTO: 38.7 %
MAN DIFF?: NORMAL
MCHC RBC-ENTMCNC: 28.3 PG
MCHC RBC-ENTMCNC: 30.6 GM/DL
MCV RBC AUTO: 92.6 FL
MICROALBUMIN 24H UR DL<=1MG/L-MCNC: <1.2 MG/DL
MICROALBUMIN/CREAT 24H UR-RTO: NORMAL MG/G
MONOCYTES # BLD AUTO: 0.4 K/UL
MONOCYTES NFR BLD AUTO: 5.6 %
NEUTROPHILS # BLD AUTO: 3.58 K/UL
NEUTROPHILS NFR BLD AUTO: 50.5 %
NONHDLC SERPL-MCNC: 131 MG/DL
PLATELET # BLD AUTO: 376 K/UL
POTASSIUM SERPL-SCNC: 4.6 MMOL/L
PROT SERPL-MCNC: 7.7 G/DL
RBC # BLD: 4.31 M/UL
RBC # FLD: 13.5 %
SODIUM SERPL-SCNC: 141 MMOL/L
T3FREE SERPL-MCNC: 2.98 PG/ML
T4 FREE SERPL-MCNC: 1.1 NG/DL
TRIGL SERPL-MCNC: 57 MG/DL
TSH SERPL-ACNC: 4.05 UIU/ML
VIT B12 SERPL-MCNC: 640 PG/ML
WBC # FLD AUTO: 7.1 K/UL

## 2022-07-27 ENCOUNTER — RX RENEWAL (OUTPATIENT)
Age: 62
End: 2022-07-27

## 2022-07-29 NOTE — PHYSICAL THERAPY INITIAL EVALUATION ADULT - DIAGNOSIS, PT EVAL
Patient Education        Well Visit, Ages 25 to 48: Care Instructions  Overview     Well visits can help you stay healthy. Your doctor has checked your overall health and may have suggested ways to take good care of yourself. Your doctor also may have recommended tests. At home, you can help prevent illness withhealthy eating, regular exercise, and other steps. Follow-up care is a key part of your treatment and safety. Be sure to make and go to all appointments, and call your doctor if you are having problems. It's also a good idea to know your test results and keep alist of the medicines you take. How can you care for yourself at home? Get screening tests that you and your doctor decide on. Screening helps find diseases before any symptoms appear. Eat healthy foods. Choose fruits, vegetables, whole grains, protein, and low-fat dairy foods. Limit fat, especially saturated fat. Reduce salt in your diet. Limit alcohol. If you are a man, have no more than 2 drinks a day or 14 drinks a week. If you are a woman, have no more than 1 drink a day or 7 drinks a week. Get at least 30 minutes of physical activity on most days of the week. Walking is a good choice. You also may want to do other activities, such as running, swimming, cycling, or playing tennis or team sports. Discuss any changes in your exercise program with your doctor. Reach and stay at a healthy weight. This will lower your risk for many problems, such as obesity, diabetes, heart disease, and high blood pressure. Do not smoke or allow others to smoke around you. If you need help quitting, talk to your doctor about stop-smoking programs and medicines. These can increase your chances of quitting for good. Care for your mental health. It is easy to get weighed down by worry and stress. Learn strategies to manage stress, like deep breathing and mindfulness, and stay connected with your family and community.  If you find you often feel sad or hopeless, talk with your doctor. Treatment can help. Talk to your doctor about whether you have any risk factors for sexually transmitted infections (STIs). You can help prevent STIs if you wait to have sex with a new partner (or partners) until you've each been tested for STIs. It also helps if you use condoms (male or female condoms) and if you limit your sex partners to one person who only has sex with you. Vaccines are available for some STIs, such as HPV. Use birth control if it's important to you to prevent pregnancy. Talk with your doctor about the choices available and what might be best for you. If you think you may have a problem with alcohol or drug use, talk to your doctor. This includes prescription medicines (such as amphetamines and opioids) and illegal drugs (such as cocaine and methamphetamine). Your doctor can help you figure out what type of treatment is best for you. Protect your skin from too much sun. When you're outdoors from 10 a.m. to 4 p.m., stay in the shade or cover up with clothing and a hat with a wide brim. Wear sunglasses that block UV rays. Even when it's cloudy, put broad-spectrum sunscreen (SPF 30 or higher) on any exposed skin. See a dentist one or two times a year for checkups and to have your teeth cleaned. Wear a seat belt in the car. When should you call for help? Watch closely for changes in your health, and be sure to contact your doctor if you have any problems or symptoms that concern you. Where can you learn more? Go to https://alexi.[employer]. org and sign in to your "[employer]" account. Enter P072 in the SI-BONE box to learn more about \"Well Visit, Ages 25 to 48: Care Instructions. \"     If you do not have an account, please click on the \"Sign Up Now\" link. Current as of: October 6, 2021               Content Version: 13.3  © 1468-9340 Healthwise, Incorporated. Care instructions adapted under license by [facility] ([facility]).  If you have questions about a medical condition or this instruction, always ask your healthcare professional. [address] any warranty or liability for your use of this information. Patient Education        Breast Self-Exam: Care Instructions  Your Care Instructions     A breast self-exam is when you check your breasts for lumps or changes. This regular exam helps you learn how your breasts normally look and feel. Mostbreast problems or changes are not because of cancer. Breast self-exam is not a substitute for a mammogram. Having regular breast exams by your doctor and regular mammograms improve your chances of finding anyproblems with your breasts. Some women set a time each month to do a step-by-step breast self-exam. Other women like a less formal system. They might look at their breasts as they brushtheir teeth, or feel their breasts once in a while in the shower. If you notice a change in your breast, tell your doctor. Follow-up care is a key part of your treatment and safety. Be sure to make and go to all appointments, and call your doctor if you are having problems. It's also a good idea to know your test results and keep alist of the medicines you take. How do you do a breast self-exam?  The best time to examine your breasts is usually one week after your menstrual period begins. Your breasts should not be tender then. If you do not have periods, you might do your exam on a day of the month that is easy to remember. To examine your breasts:  Remove all your clothes above the waist and lie down. When you are lying down, your breast tissue spreads evenly over your chest wall, which makes it easier to feel all your breast tissue. Use the pads--not the fingertips--of the 3 middle fingers of your left hand to check your right breast. Move your fingers slowly in small coin-sized circles that overlap. Use three levels of pressure to feel of all your breast tissue.  Use light pressure to feel the tissue close to the skin surface. Use medium pressure to feel a little deeper. Use firm pressure to feel your tissue close to your breastbone and ribs. Use each pressure level to feel your breast tissue before moving on to the next spot. Check your entire breast, moving up and down as if following a strip from the collarbone to the bra line, and from the armpit to the ribs. Repeat until you have covered the entire breast.  Repeat this procedure for your left breast, using the pads of the 3 middle fingers of your right hand. To examine your breasts while in the shower:  Place one arm over your head and lightly soap your breast on that side. Using the pads of your fingers, gently move your hand over your breast (in the strip pattern described above), feeling carefully for any lumps or changes. Repeat for the other breast.  Have your doctor inspect anything you notice to see if you need further testing. Where can you learn more? Go to https://[employer].Mumum[employer]o. org and sign in to your [employer] account. Enter P148 in the GOGETMi / ?????.?? box to learn more about \"Breast Self-Exam: Care Instructions. \"     If you do not have an account, please click on the \"Sign Up Now\" link. Current as of: September 8, 2021               Content Version: 13.3  © 2006-2022 Healthwise, Incorporated. Care instructions adapted under license by [facility] ([facility]). If you have questions about a medical condition or this instruction, always ask your healthcare professional. [place] [address] any warranty or liability for your use of this information. Episode of DKA

## 2022-08-08 ENCOUNTER — RX RENEWAL (OUTPATIENT)
Age: 62
End: 2022-08-08

## 2022-08-11 ENCOUNTER — APPOINTMENT (OUTPATIENT)
Dept: RHEUMATOLOGY | Facility: CLINIC | Age: 62
End: 2022-08-11

## 2022-08-11 ENCOUNTER — APPOINTMENT (OUTPATIENT)
Dept: PULMONOLOGY | Facility: CLINIC | Age: 62
End: 2022-08-11

## 2022-08-11 VITALS
SYSTOLIC BLOOD PRESSURE: 152 MMHG | WEIGHT: 238 LBS | OXYGEN SATURATION: 97 % | RESPIRATION RATE: 16 BRPM | BODY MASS INDEX: 38.25 KG/M2 | DIASTOLIC BLOOD PRESSURE: 80 MMHG | HEIGHT: 66 IN | HEART RATE: 83 BPM

## 2022-08-11 PROCEDURE — 99214 OFFICE O/P EST MOD 30 MIN: CPT

## 2022-08-12 ENCOUNTER — APPOINTMENT (OUTPATIENT)
Dept: RHEUMATOLOGY | Facility: CLINIC | Age: 62
End: 2022-08-12

## 2022-08-12 ENCOUNTER — RESULT REVIEW (OUTPATIENT)
Age: 62
End: 2022-08-12

## 2022-08-12 VITALS
TEMPERATURE: 97.9 F | HEART RATE: 83 BPM | OXYGEN SATURATION: 97 % | DIASTOLIC BLOOD PRESSURE: 97 MMHG | SYSTOLIC BLOOD PRESSURE: 140 MMHG

## 2022-08-12 DIAGNOSIS — Z82.3 FAMILY HISTORY OF STROKE: ICD-10-CM

## 2022-08-12 DIAGNOSIS — Z87.09 PERSONAL HISTORY OF OTHER DISEASES OF THE RESPIRATORY SYSTEM: ICD-10-CM

## 2022-08-12 DIAGNOSIS — M25.50 PAIN IN UNSPECIFIED JOINT: ICD-10-CM

## 2022-08-12 DIAGNOSIS — M19.90 UNSPECIFIED OSTEOARTHRITIS, UNSPECIFIED SITE: ICD-10-CM

## 2022-08-12 DIAGNOSIS — Z86.39 PERSONAL HISTORY OF OTHER ENDOCRINE, NUTRITIONAL AND METABOLIC DISEASE: ICD-10-CM

## 2022-08-12 DIAGNOSIS — Z84.1 FAMILY HISTORY OF DISORDERS OF KIDNEY AND URETER: ICD-10-CM

## 2022-08-12 DIAGNOSIS — E89.40 ASYMPTOMATIC POSTPROCEDURAL OVARIAN FAILURE: ICD-10-CM

## 2022-08-12 DIAGNOSIS — Z83.3 FAMILY HISTORY OF DIABETES MELLITUS: ICD-10-CM

## 2022-08-12 DIAGNOSIS — Z87.2 PERSONAL HISTORY OF DISEASES OF THE SKIN AND SUBCUTANEOUS TISSUE: ICD-10-CM

## 2022-08-12 PROCEDURE — 99205 OFFICE O/P NEW HI 60 MIN: CPT

## 2022-08-12 RX ORDER — SYRING-NEEDL,DISP,INSUL,0.3 ML 31GX15/64"
31G X 15/64" SYRINGE, EMPTY DISPOSABLE MISCELLANEOUS
Qty: 3 | Refills: 1 | Status: DISCONTINUED | COMMUNITY
Start: 2022-04-14 | End: 2022-08-12

## 2022-08-12 RX ORDER — INSULIN GLARGINE 100 [IU]/ML
100 INJECTION, SOLUTION SUBCUTANEOUS
Qty: 6 | Refills: 1 | Status: DISCONTINUED | COMMUNITY
Start: 2022-04-14 | End: 2022-08-12

## 2022-08-19 ENCOUNTER — RX RENEWAL (OUTPATIENT)
Age: 62
End: 2022-08-19

## 2022-08-19 PROBLEM — Z84.1 FAMILY HISTORY OF ACUTE RENAL FAILURE: Status: ACTIVE | Noted: 2022-08-19

## 2022-08-19 PROBLEM — Z86.39 HISTORY OF DIABETES MELLITUS: Status: RESOLVED | Noted: 2022-08-19 | Resolved: 2022-08-19

## 2022-08-19 PROBLEM — Z82.3 FH: CVA (CEREBROVASCULAR ACCIDENT): Status: ACTIVE | Noted: 2022-08-19

## 2022-08-19 PROBLEM — Z87.09 HISTORY OF ASTHMA: Status: RESOLVED | Noted: 2022-08-19 | Resolved: 2022-08-19

## 2022-08-19 PROBLEM — Z87.2 HISTORY OF ROSACEA: Status: RESOLVED | Noted: 2022-08-19 | Resolved: 2022-08-19

## 2022-08-19 PROBLEM — Z83.3 FAMILY HISTORY OF DIABETES MELLITUS: Status: ACTIVE | Noted: 2022-03-15

## 2022-08-19 PROBLEM — E89.40 SURGICAL MENOPAUSE: Status: RESOLVED | Noted: 2022-08-19 | Resolved: 2022-08-19

## 2022-08-19 NOTE — REASON FOR VISIT
[Consultation] : a consultation visit [FreeTextEntry1] : Patient was referred for further evaluation of joint symptoms and rheumatic disease

## 2022-08-19 NOTE — CONSULT LETTER
[Dear  ___] : Dear  [unfilled], [Consult Letter:] : I had the pleasure of evaluating your patient, [unfilled]. [Please see my note below.] : Please see my note below. [Consult Closing:] : Thank you very much for allowing me to participate in the care of this patient.  If you have any questions, please do not hesitate to contact me. [Sincerely,] : Sincerely, [FreeTextEntry3] : Tristian\par Myron I. Kleiner, M.D., FACR\par Chief, Division of Rheumatology\par Department of Medicine\par [facility]

## 2022-08-19 NOTE — HISTORY OF PRESENT ILLNESS
[FreeTextEntry1] : 62-year-old -American woman referred for further evaluation of her joint symptoms and rheumatic disease.\par \par 6-month history of bilateral knee pain.  Morning stiffness 5 minutes.  She has dry eyes and dry skin but denies dry mouth and vaginal dryness.  She denies recent sleep disturbance and fatigue.  She does have occasional snoring.\par She has a history of vitamin D deficiency for which she is taking vitamin D 2000 units daily.\par She has seen me in the past--last 2017--- with diagnoses of fibromyalgia, vitamin D deficiency, fatigue, gammopathy.  She declined medications at That Time.  She did take vitamin D 2000 units daily at that time.  She is referred no for further evaluation.\par

## 2022-08-19 NOTE — PHYSICAL EXAM
[General Appearance - In No Acute Distress] : in no acute distress [General Appearance - Alert] : alert [General Appearance - Well Nourished] : well nourished [General Appearance - Well Developed] : well developed [General Appearance - Well-Appearing] : healthy appearing [Sclera] : the sclera and conjunctiva were normal [PERRL With Normal Accommodation] : pupils were equal in size, round, and reactive to light [Extraocular Movements] : extraocular movements were intact [Outer Ear] : the ears and nose were normal in appearance [Neck Appearance] : the appearance of the neck was normal [Neck Cervical Mass (___cm)] : no neck mass was observed [Jugular Venous Distention Increased] : there was no jugular-venous distention [Thyroid Diffuse Enlargement] : the thyroid was not enlarged [Thyroid Nodule] : there were no palpable thyroid nodules [Lungs Percussion] : the lungs were normal to percussion [Heart Rate And Rhythm] : heart rate was normal and rhythm regular [Edema] : there was no peripheral edema [Abdomen Soft] : soft [Abdomen Tenderness] : non-tender [Abdomen Mass (___ Cm)] : no abdominal mass palpated [Cervical Lymph Nodes Enlarged Posterior Bilaterally] : posterior cervical [Cervical Lymph Nodes Enlarged Anterior Bilaterally] : anterior cervical [Supraclavicular Lymph Nodes Enlarged Bilaterally] : supraclavicular [Axillary Lymph Nodes Enlarged Bilaterally] : axillary [No CVA Tenderness] : no ~M costovertebral angle tenderness [No Spinal Tenderness] : no spinal tenderness [Skin Color & Pigmentation] : normal skin color and pigmentation [Skin Turgor] : normal skin turgor [] : no rash [Cranial Nerves] : cranial nerves 2-12 were intact [Deep Tendon Reflexes (DTR)] : deep tendon reflexes were 2+ and symmetric [Sensation] : the sensory exam was normal to light touch and pinprick [Motor Exam] : the motor exam was normal [No Focal Deficits] : no focal deficits [Oriented To Time, Place, And Person] : oriented to person, place, and time [Impaired Insight] : insight and judgment were intact [Affect] : the affect was normal [Mood] : the mood was normal [FreeTextEntry1] : Strength 5/5

## 2022-08-19 NOTE — ASSESSMENT
[FreeTextEntry1] : Impression: 62-year-old woman referred for further evaluation of joint symptoms and rheumatic disease.\par \par 6-month history of bilateral knee pain especially with weightbearing.  I will evaluate her for various types of rheumatic disease.  She also has dry eyes and dry skin--- consider Sjogren's syndrome, and evaluate for sarcoidosis, hepatitis C, IgG4-related disease.  Although she has a history of fibromyalgia in the past, she denies recent sleep disturbance and fatigue although she does state that she has occasional snoring at this time.\par She has a history of vitamin D deficiency--which I will reevaluate.  She denies rash or side effects with medications.  Patient denies rash or side effects with their medications.  Patient is content with their current treatment regimen.\par \par Note: Despite with the computer states in the note weight below, I did not discontinue Lantus//alprazolam/ProAir.\par \par Plan: Extensive and lengthy review of the chart and previous records\par  I reviewed previous lab results with patient with extensive discussion\par Laboratory tests ordered today-see list below-with coordination of care\par For the next office visit, withhold vitamin D supplements the day of the encounter and the 2 days prior--to recheck vitamin D level\par X-rays ordered--see list below--with coordination of care\par Bone densitometry ordered--with coordination of care\par Obtain previous chest CAT scan report\par Diagnosis and prognosis discussed\par Continue other current medications (other than those changed below)\par Artificial tears one drop each eye q.i.d. and p.r.n.(Possible side effects explained)\par Biotin mouthwash/spray q.i.d. and p.r.n.(Possible side effects explained)\par Oral hydration\par Sleep study--already ordered by her other physicians\par Bilateral knee exercises--instruction sheet given and discussed--extensive discussion and demonstration\par Patient declined NSAIDs\par Tylenol 1000 mg t.i.d. p.r.n. joint pain or Tylenol 1300 mg b.i.d. p.r.n. joint pain (possible side effects explained)\par Return visit 2 to 3 weeks

## 2022-08-30 ENCOUNTER — APPOINTMENT (OUTPATIENT)
Dept: ENDOCRINOLOGY | Facility: CLINIC | Age: 62
End: 2022-08-30

## 2022-09-01 ENCOUNTER — APPOINTMENT (OUTPATIENT)
Dept: RADIOLOGY | Facility: CLINIC | Age: 62
End: 2022-09-01

## 2022-09-01 ENCOUNTER — LABORATORY RESULT (OUTPATIENT)
Age: 62
End: 2022-09-01

## 2022-09-01 ENCOUNTER — OUTPATIENT (OUTPATIENT)
Dept: OUTPATIENT SERVICES | Facility: HOSPITAL | Age: 62
LOS: 1 days | End: 2022-09-01
Payer: COMMERCIAL

## 2022-09-01 DIAGNOSIS — E55.9 VITAMIN D DEFICIENCY, UNSPECIFIED: ICD-10-CM

## 2022-09-01 DIAGNOSIS — M25.561 PAIN IN RIGHT KNEE: ICD-10-CM

## 2022-09-01 DIAGNOSIS — M25.50 PAIN IN UNSPECIFIED JOINT: ICD-10-CM

## 2022-09-01 DIAGNOSIS — Z90.710 ACQUIRED ABSENCE OF BOTH CERVIX AND UTERUS: Chronic | ICD-10-CM

## 2022-09-01 DIAGNOSIS — Z98.89 OTHER SPECIFIED POSTPROCEDURAL STATES: Chronic | ICD-10-CM

## 2022-09-01 DIAGNOSIS — M19.90 UNSPECIFIED OSTEOARTHRITIS, UNSPECIFIED SITE: ICD-10-CM

## 2022-09-01 DIAGNOSIS — H04.123 DRY EYE SYNDROME OF BILATERAL LACRIMAL GLANDS: ICD-10-CM

## 2022-09-01 DIAGNOSIS — Z00.8 ENCOUNTER FOR OTHER GENERAL EXAMINATION: ICD-10-CM

## 2022-09-01 DIAGNOSIS — Z90.49 ACQUIRED ABSENCE OF OTHER SPECIFIED PARTS OF DIGESTIVE TRACT: Chronic | ICD-10-CM

## 2022-09-01 PROCEDURE — 73562 X-RAY EXAM OF KNEE 3: CPT | Mod: 26,50

## 2022-09-01 PROCEDURE — 77085 DXA BONE DENSITY AXL VRT FX: CPT

## 2022-09-01 PROCEDURE — 77085 DXA BONE DENSITY AXL VRT FX: CPT | Mod: 26

## 2022-09-01 PROCEDURE — 73521 X-RAY EXAM HIPS BI 2 VIEWS: CPT

## 2022-09-01 PROCEDURE — 73521 X-RAY EXAM HIPS BI 2 VIEWS: CPT | Mod: 26

## 2022-09-01 PROCEDURE — 73562 X-RAY EXAM OF KNEE 3: CPT

## 2022-09-06 ENCOUNTER — APPOINTMENT (OUTPATIENT)
Dept: PULMONOLOGY | Facility: CLINIC | Age: 62
End: 2022-09-06

## 2022-09-12 ENCOUNTER — NON-APPOINTMENT (OUTPATIENT)
Age: 62
End: 2022-09-12

## 2022-09-13 LAB
ACE BLD-CCNC: 33 U/L
ALBUMIN MFR SERPL ELPH: 51.8 %
ALBUMIN SERPL ELPH-MCNC: 4.2 G/DL
ALBUMIN SERPL-MCNC: 3.9 G/DL
ALBUMIN/GLOB SERPL: 1.1 RATIO
ALP BLD-CCNC: 102 U/L
ALPHA1 GLOB MFR SERPL ELPH: 3.9 %
ALPHA1 GLOB SERPL ELPH-MCNC: 0.3 G/DL
ALPHA2 GLOB MFR SERPL ELPH: 8.1 %
ALPHA2 GLOB SERPL ELPH-MCNC: 0.6 G/DL
ALT SERPL-CCNC: 15 U/L
ANION GAP SERPL CALC-SCNC: 9 MMOL/L
APPEARANCE: CLEAR
AST SERPL-CCNC: 15 U/L
B-GLOBULIN MFR SERPL ELPH: 14.6 %
B-GLOBULIN SERPL ELPH-MCNC: 1.1 G/DL
BACTERIA: NEGATIVE
BASOPHILS # BLD AUTO: 0.04 K/UL
BASOPHILS NFR BLD AUTO: 0.6 %
BILIRUB SERPL-MCNC: 0.2 MG/DL
BILIRUBIN URINE: NEGATIVE
BLOOD URINE: NORMAL
BUN SERPL-MCNC: 16 MG/DL
CALCIUM SERPL-MCNC: 9.3 MG/DL
CCP AB SER IA-ACNC: <8 UNITS
CHLORIDE SERPL-SCNC: 102 MMOL/L
CK SERPL-CCNC: 193 U/L
CO2 SERPL-SCNC: 28 MMOL/L
COLOR: NORMAL
CREAT SERPL-MCNC: 0.81 MG/DL
CRP SERPL-MCNC: 13 MG/L
DEPRECATED KAPPA LC FREE/LAMBDA SER: 1.17 RATIO
DSDNA AB SER-ACNC: <12 IU/ML
EGFR: 82 ML/MIN/1.73M2
ENA SS-A AB SER IA-ACNC: <0.2 AL
ENA SS-B AB SER IA-ACNC: <0.2 AL
EOSINOPHIL # BLD AUTO: 0.26 K/UL
EOSINOPHIL NFR BLD AUTO: 4.1 %
ERYTHROCYTE [SEDIMENTATION RATE] IN BLOOD BY WESTERGREN METHOD: 26 MM/HR
GAMMA GLOB FLD ELPH-MCNC: 1.6 G/DL
GAMMA GLOB MFR SERPL ELPH: 21.6 %
GLUCOSE QUALITATIVE U: NEGATIVE
GLUCOSE SERPL-MCNC: 130 MG/DL
HAV IGM SER QL: NONREACTIVE
HBV CORE IGG+IGM SER QL: NONREACTIVE
HBV CORE IGM SER QL: NONREACTIVE
HBV SURFACE AG SER QL: NONREACTIVE
HCT VFR BLD CALC: 38 %
HCV AB SER QL: NONREACTIVE
HCV S/CO RATIO: 0.15 S/CO
HGB BLD-MCNC: 12.1 G/DL
HYALINE CASTS: 3 /LPF
IGA SER QL IEP: 357 MG/DL
IGG SER QL IEP: 1703 MG/DL
IGG SUBSET TOTAL IGG: 1829 MG/DL
IGG1 SER-MCNC: 1029 MG/DL
IGG2 SER-MCNC: 431 MG/DL
IGG3 SER-MCNC: 139 MG/DL
IGG4 SER-MCNC: 44 MG/DL
IGM SER QL IEP: 87 MG/DL
IMM GRANULOCYTES NFR BLD AUTO: 0.3 %
INTERPRETATION SERPL IEP-IMP: NORMAL
KAPPA LC CSF-MCNC: 2.91 MG/DL
KAPPA LC SERPL-MCNC: 3.41 MG/DL
KETONES URINE: NEGATIVE
LDH SERPL-CCNC: 218 U/L
LEUKOCYTE ESTERASE URINE: NEGATIVE
LYMPHOCYTES # BLD AUTO: 1.92 K/UL
LYMPHOCYTES NFR BLD AUTO: 30.6 %
M PROTEIN MFR SERPL ELPH: NORMAL
M PROTEIN SPEC IFE-MCNC: NORMAL
MAGNESIUM SERPL-MCNC: 2 MG/DL
MAN DIFF?: NORMAL
MCHC RBC-ENTMCNC: 28.8 PG
MCHC RBC-ENTMCNC: 31.8 GM/DL
MCV RBC AUTO: 90.5 FL
MICROSCOPIC-UA: NORMAL
MONOCLON BAND OBS SERPL: NORMAL
MONOCYTES # BLD AUTO: 0.3 K/UL
MONOCYTES NFR BLD AUTO: 4.8 %
NEUTROPHILS # BLD AUTO: 3.74 K/UL
NEUTROPHILS NFR BLD AUTO: 59.6 %
NITRITE URINE: NEGATIVE
PH URINE: 6
PHOSPHATE SERPL-MCNC: 3.7 MG/DL
PLATELET # BLD AUTO: 337 K/UL
POTASSIUM SERPL-SCNC: 4.2 MMOL/L
PROT SERPL-MCNC: 7.6 G/DL
PROTEIN URINE: NEGATIVE
RBC # BLD: 4.2 M/UL
RBC # FLD: 14.3 %
RED BLOOD CELLS URINE: 2 /HPF
RF+CCP IGG SER-IMP: NEGATIVE
RHEUMATOID FACT SER QL: 20 IU/ML
SODIUM SERPL-SCNC: 139 MMOL/L
SPECIFIC GRAVITY URINE: 1.02
SQUAMOUS EPITHELIAL CELLS: 3 /HPF
THYROGLOB AB SERPL-ACNC: <20 IU/ML
THYROPEROXIDASE AB SERPL IA-ACNC: <10 IU/ML
URATE SERPL-MCNC: 6.2 MG/DL
UROBILINOGEN URINE: NORMAL
WBC # FLD AUTO: 6.28 K/UL
WHITE BLOOD CELLS URINE: 2 /HPF

## 2022-09-15 RX ORDER — MONTELUKAST 10 MG/1
10 TABLET, FILM COATED ORAL
Qty: 90 | Refills: 0 | Status: ACTIVE | OUTPATIENT
Start: 2020-10-05

## 2022-09-16 ENCOUNTER — NON-APPOINTMENT (OUTPATIENT)
Age: 62
End: 2022-09-16

## 2022-09-16 ENCOUNTER — RESULT CHARGE (OUTPATIENT)
Age: 62
End: 2022-09-16

## 2022-09-16 ENCOUNTER — APPOINTMENT (OUTPATIENT)
Dept: ENDOCRINOLOGY | Facility: CLINIC | Age: 62
End: 2022-09-16

## 2022-09-16 VITALS
BODY MASS INDEX: 38.57 KG/M2 | HEIGHT: 66 IN | DIASTOLIC BLOOD PRESSURE: 82 MMHG | WEIGHT: 240 LBS | SYSTOLIC BLOOD PRESSURE: 142 MMHG

## 2022-09-16 LAB — GLUCOSE BLDC GLUCOMTR-MCNC: 112

## 2022-09-16 PROCEDURE — 95251 CONT GLUC MNTR ANALYSIS I&R: CPT

## 2022-09-16 PROCEDURE — 82962 GLUCOSE BLOOD TEST: CPT

## 2022-09-16 PROCEDURE — 99214 OFFICE O/P EST MOD 30 MIN: CPT | Mod: 25

## 2022-09-16 NOTE — REVIEW OF SYSTEMS
[SOB on Exertion] : shortness of breath on exertion [Fatigue] : no fatigue [Recent Weight Gain (___ Lbs)] : no recent weight gain [Recent Weight Loss (___ Lbs)] : no recent weight loss [Visual Field Defect] : no visual field defect [Blurred Vision] : no blurred vision [Dysphagia] : no dysphagia [Neck Pain] : no neck pain [Dysphonia] : no dysphonia [Chest Pain] : no chest pain [Constipation] : no constipation [Diarrhea] : no diarrhea [Polyuria] : no polyuria [Polydipsia] : no polydipsia
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
## 2022-09-16 NOTE — ASSESSMENT
[FreeTextEntry1] : T2DM\par -Pt doing extremely well! Will continue to titrate insulin as glucose levels continue to improve. \par -Increase  mg ER daily to BID.\par -For now, continue Lantus to 30 units BID \par -Eliminate Ademlog\par -Would like to add GLP1 next \par -Continue Claus use- will download virtually in 2-3 weeks\par -Continue to watch diet\par -Continue to exercise as tolerated, goal of 30 mins a day 5 x a week\par -Continue to follow up with all specialists including ophtho \par \par \par Partial thyroidectomy\par -Continue to monitor TFTs, on no medication for now\par -No need for updated thyroid sonogram at this time\par \par \par HTN\par -BP slightly elevated in office, continue regimen, will continue to monitor \par \par \par Vit D Def\par -Will check levels with next labs, continue daily supplement\par \par Slightly elevated LDL\par -Pt will work on diet/exercise efforts, will recheck with next fasting labs, if not improved may consider statin due to FH of MI\par \par Fasting labs due 10/2022\par RTO 6 weeks CDE-diet refresh\par RTO 3 months

## 2022-09-21 ENCOUNTER — OUTPATIENT (OUTPATIENT)
Dept: OUTPATIENT SERVICES | Facility: HOSPITAL | Age: 62
LOS: 1 days | End: 2022-09-21
Payer: COMMERCIAL

## 2022-09-21 DIAGNOSIS — Z98.89 OTHER SPECIFIED POSTPROCEDURAL STATES: Chronic | ICD-10-CM

## 2022-09-21 DIAGNOSIS — Z90.49 ACQUIRED ABSENCE OF OTHER SPECIFIED PARTS OF DIGESTIVE TRACT: Chronic | ICD-10-CM

## 2022-09-21 DIAGNOSIS — Z90.710 ACQUIRED ABSENCE OF BOTH CERVIX AND UTERUS: Chronic | ICD-10-CM

## 2022-09-21 DIAGNOSIS — G47.33 OBSTRUCTIVE SLEEP APNEA (ADULT) (PEDIATRIC): ICD-10-CM

## 2022-09-21 PROCEDURE — 95800 SLP STDY UNATTENDED: CPT

## 2022-09-22 ENCOUNTER — APPOINTMENT (OUTPATIENT)
Dept: RHEUMATOLOGY | Facility: CLINIC | Age: 62
End: 2022-09-22

## 2022-09-22 VITALS
RESPIRATION RATE: 17 BRPM | HEART RATE: 88 BPM | OXYGEN SATURATION: 98 % | WEIGHT: 238 LBS | DIASTOLIC BLOOD PRESSURE: 90 MMHG | TEMPERATURE: 98 F | HEIGHT: 66 IN | BODY MASS INDEX: 38.25 KG/M2 | SYSTOLIC BLOOD PRESSURE: 138 MMHG

## 2022-09-22 DIAGNOSIS — R53.83 OTHER FATIGUE: ICD-10-CM

## 2022-09-22 DIAGNOSIS — D47.2 MONOCLONAL GAMMOPATHY: ICD-10-CM

## 2022-09-22 DIAGNOSIS — M35.00 SICCA SYNDROME, UNSPECIFIED: ICD-10-CM

## 2022-09-22 PROCEDURE — 99215 OFFICE O/P EST HI 40 MIN: CPT | Mod: 25

## 2022-09-22 PROCEDURE — G2212 PROLONG OUTPT/OFFICE VIS: CPT

## 2022-09-22 RX ORDER — DEXTROSE 3.75 G
4 TABLET,CHEWABLE ORAL
Qty: 60 | Refills: 0 | Status: DISCONTINUED | COMMUNITY
Start: 2022-03-15 | End: 2022-09-22

## 2022-09-22 RX ORDER — ACETAMINOPHEN 500 MG/1
500 TABLET, COATED ORAL
Qty: 100 | Refills: 0 | Status: DISCONTINUED | COMMUNITY
Start: 2022-08-19 | End: 2022-09-22

## 2022-09-22 RX ORDER — ASPIRIN ENTERIC COATED TABLETS 81 MG 81 MG/1
81 TABLET, DELAYED RELEASE ORAL
Qty: 100 | Refills: 0 | Status: ACTIVE | COMMUNITY
Start: 2022-09-22

## 2022-10-03 PROBLEM — M35.00 HISTORY OF SJOGREN'S DISEASE: Status: RESOLVED | Noted: 2022-09-22 | Resolved: 2022-09-22
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
## 2022-10-03 NOTE — REVIEW OF SYSTEMS
[Dry Eyes] : dryness of the eyes [As Noted in HPI] : as noted in HPI [Joint Pain] : joint pain [Negative] : Heme/Lymph [Feeling Tired] : not feeling tired

## 2022-10-03 NOTE — ASSESSMENT
[FreeTextEntry1] : Impression: JOSE BURKS is a 62 year old woman who  presents in the office for her initial follow-up visit regarding further evaluation of joint symptoms and rheumatic diseases, including osteoarthritis, Sjogren's syndrome, fibromyalgia, and history of vitamin D deficiency.\par \par Patient feels fairly well. She has pain bilateral knees and hips with decreased range of motion secondary to her osteoarthritis.. She denies recent sleep disturbance and fatigue with her fibromyalgia somewhat improved. She report dry eyes, dry mouth, dry skin secondary to Sjogren's syndrome, using artificial tears, biotene mouthwash and oral hydration with relief. She continues daily exercises (including knee exercises). Recent lab tests revealed gammopathy, but no other significant results or changes, with extensive discussion-- I will repeat this test. Recent x-ray tests revealed multiple points of osteoarthritis in her bilateral hips and knees, but no other significant results or changes, with extensive discussion. Recent bone densitometry was normal.  Patient denies rash or side effects with current medications. Patient is content with current medication regimen. \par \par Plan:\par I reviewed previous lab results with patient with extensive discussion\par I reviewed recent x-ray results with patient with extensive discussion \par I reviewed recent bone densitometry results including my analysis of the raw data with patient with extensive discussion \par Laboratory tests ordered today-see list below-with coordination of care\par Diagnosis and prognosis discussed\par Continue other current medications (other than those changed below)\par Omeprazole 20 mg q.d. a.c. breakfast (possible side effects explained) \par Prophylactic aspirin 81 mg q.d. at end of supper (possible side effects explained) \par Meloxicam 15 mg q.d. at end of breakfast (Possible side effects explained including cardiovascular risk/MI/CVA) \par Artificial tears one drop each eye q.i.d. and p.r.n.(Possible side effects explained)\par Biotin mouthwash/spray q.i.d. and p.r.n.(Possible side effects explained)\par Oral hydration\par Patient declined oral medication for dryness\par Daily exercise starting at 10 minutes per day, gradually increasing to at least 30 minutes per day--emphasized \par Physical therapy with special attention to bilateral knees and hips-- script given with coordination of care \par Return visit 4 month\par Total time for this office visit, including face-to-face time and non-face-to-face time, 85 minutes--- including review of the chart and previous records, review of previous lab results with extensive discussion with the patient, ordering lab tests with coordination of care, review of recent imaging reports/x-ray results with extensive discussion with the patient, review of her bone densitometry with my analysis of the raw data with extensive discussion with the patient, detailed medication history, review of medications going forward with their possible side effects, reviewed the impact of the patient's rheumatic disease on their other medical problems, reviewed the impact of the patient's other medical problems on their rheumatic disease

## 2022-10-03 NOTE — PHYSICAL EXAM
[General Appearance - Alert] : alert [General Appearance - In No Acute Distress] : in no acute distress [General Appearance - Well Nourished] : well nourished [General Appearance - Well Developed] : well developed [General Appearance - Well-Appearing] : healthy appearing [Sclera] : the sclera and conjunctiva were normal [PERRL With Normal Accommodation] : pupils were equal in size, round, and reactive to light [Extraocular Movements] : extraocular movements were intact [Outer Ear] : the ears and nose were normal in appearance [Neck Appearance] : the appearance of the neck was normal [Neck Cervical Mass (___cm)] : no neck mass was observed [Jugular Venous Distention Increased] : there was no jugular-venous distention [Thyroid Diffuse Enlargement] : the thyroid was not enlarged [Thyroid Nodule] : there were no palpable thyroid nodules [Lungs Percussion] : the lungs were normal to percussion [Heart Rate And Rhythm] : heart rate was normal and rhythm regular [Edema] : there was no peripheral edema [Abdomen Soft] : soft [Abdomen Tenderness] : non-tender [Abdomen Mass (___ Cm)] : no abdominal mass palpated [Cervical Lymph Nodes Enlarged Posterior Bilaterally] : posterior cervical [Cervical Lymph Nodes Enlarged Anterior Bilaterally] : anterior cervical [Supraclavicular Lymph Nodes Enlarged Bilaterally] : supraclavicular [Axillary Lymph Nodes Enlarged Bilaterally] : axillary [No CVA Tenderness] : no ~M costovertebral angle tenderness [No Spinal Tenderness] : no spinal tenderness [Skin Color & Pigmentation] : normal skin color and pigmentation [Skin Turgor] : normal skin turgor [] : no rash [Cranial Nerves] : cranial nerves 2-12 were intact [Deep Tendon Reflexes (DTR)] : deep tendon reflexes were 2+ and symmetric [Sensation] : the sensory exam was normal to light touch and pinprick [Motor Exam] : the motor exam was normal [No Focal Deficits] : no focal deficits [Oriented To Time, Place, And Person] : oriented to person, place, and time [Impaired Insight] : insight and judgment were intact [Affect] : the affect was normal [Mood] : the mood was normal [FreeTextEntry1] : Strength 5/5

## 2022-10-03 NOTE — CONSULT LETTER
[Dear  ___] : Dear  [unfilled], [Consult Letter:] : I had the pleasure of evaluating your patient, [unfilled]. [Please see my note below.] : Please see my note below. [Consult Closing:] : Thank you very much for allowing me to participate in the care of this patient.  If you have any questions, please do not hesitate to contact me. [Sincerely,] : Sincerely, [FreeTextEntry3] : Tristian\par Myron I. Kleiner, M.D., FACR\par Chief, Division of Rheumatology\par Department of Medicine\par [facility]

## 2022-10-03 NOTE — ADDENDUM
[FreeTextEntry1] : I, Kb Nguyễn, acted solely as a scribe for Dr. Myron I. Kleiner, MD. on 09/22/2022 .

## 2022-10-04 ENCOUNTER — NON-APPOINTMENT (OUTPATIENT)
Age: 62
End: 2022-10-04

## 2022-10-21 ENCOUNTER — APPOINTMENT (OUTPATIENT)
Dept: ENDOCRINOLOGY | Facility: CLINIC | Age: 62
End: 2022-10-21

## 2022-11-16 ENCOUNTER — APPOINTMENT (OUTPATIENT)
Dept: ENDOCRINOLOGY | Facility: CLINIC | Age: 62
End: 2022-11-16

## 2022-11-28 LAB — 25(OH)D3 SERPL-MCNC: 31.2 NG/ML

## 2022-11-29 LAB
25(OH)D3 SERPL-MCNC: 31.1 NG/ML
ALBUMIN SERPL ELPH-MCNC: 4 G/DL
ALP BLD-CCNC: 103 U/L
ALT SERPL-CCNC: 13 U/L
ANION GAP SERPL CALC-SCNC: 11 MMOL/L
AST SERPL-CCNC: 18 U/L
BASOPHILS # BLD AUTO: 0.04 K/UL
BASOPHILS NFR BLD AUTO: 0.7 %
BILIRUB SERPL-MCNC: 0.2 MG/DL
BUN SERPL-MCNC: 12 MG/DL
CALCIUM SERPL-MCNC: 9.2 MG/DL
CHLORIDE SERPL-SCNC: 103 MMOL/L
CHOLEST SERPL-MCNC: 195 MG/DL
CO2 SERPL-SCNC: 27 MMOL/L
CREAT SERPL-MCNC: 0.76 MG/DL
CREAT SPEC-SCNC: 225 MG/DL
EGFR: 88 ML/MIN/1.73M2
EOSINOPHIL # BLD AUTO: 0.23 K/UL
EOSINOPHIL NFR BLD AUTO: 3.9 %
ESTIMATED AVERAGE GLUCOSE: 140 MG/DL
GLUCOSE SERPL-MCNC: 108 MG/DL
HBA1C MFR BLD HPLC: 6.5 %
HCT VFR BLD CALC: 37.9 %
HDLC SERPL-MCNC: 55 MG/DL
HGB BLD-MCNC: 12 G/DL
IMM GRANULOCYTES NFR BLD AUTO: 0.2 %
LDLC SERPL CALC-MCNC: 126 MG/DL
LYMPHOCYTES # BLD AUTO: 2.01 K/UL
LYMPHOCYTES NFR BLD AUTO: 34.1 %
MAN DIFF?: NORMAL
MCHC RBC-ENTMCNC: 28.6 PG
MCHC RBC-ENTMCNC: 31.7 GM/DL
MCV RBC AUTO: 90.2 FL
MICROALBUMIN 24H UR DL<=1MG/L-MCNC: 1.2 MG/DL
MICROALBUMIN/CREAT 24H UR-RTO: 5 MG/G
MONOCYTES # BLD AUTO: 0.31 K/UL
MONOCYTES NFR BLD AUTO: 5.3 %
NEUTROPHILS # BLD AUTO: 3.29 K/UL
NEUTROPHILS NFR BLD AUTO: 55.8 %
NONHDLC SERPL-MCNC: 141 MG/DL
PLATELET # BLD AUTO: 349 K/UL
POTASSIUM SERPL-SCNC: 4.4 MMOL/L
PROT SERPL-MCNC: 7.5 G/DL
RBC # BLD: 4.2 M/UL
RBC # FLD: 13.6 %
SODIUM SERPL-SCNC: 142 MMOL/L
T3FREE SERPL-MCNC: 2.5 PG/ML
T4 FREE SERPL-MCNC: 1 NG/DL
TRIGL SERPL-MCNC: 72 MG/DL
TSH SERPL-ACNC: 2.49 UIU/ML
VIT B12 SERPL-MCNC: 514 PG/ML
WBC # FLD AUTO: 5.89 K/UL

## 2022-12-01 ENCOUNTER — APPOINTMENT (OUTPATIENT)
Dept: PULMONOLOGY | Facility: CLINIC | Age: 62
End: 2022-12-01

## 2022-12-01 VITALS — HEIGHT: 66 IN | WEIGHT: 245 LBS | BODY MASS INDEX: 39.37 KG/M2

## 2022-12-01 VITALS — RESPIRATION RATE: 16 BRPM | DIASTOLIC BLOOD PRESSURE: 80 MMHG | SYSTOLIC BLOOD PRESSURE: 124 MMHG

## 2022-12-01 DIAGNOSIS — J45.909 UNSPECIFIED ASTHMA, UNCOMPLICATED: ICD-10-CM

## 2022-12-01 DIAGNOSIS — R06.02 SHORTNESS OF BREATH: ICD-10-CM

## 2022-12-01 DIAGNOSIS — G47.33 OBSTRUCTIVE SLEEP APNEA (ADULT) (PEDIATRIC): ICD-10-CM

## 2022-12-01 PROCEDURE — 94727 GAS DIL/WSHOT DETER LNG VOL: CPT

## 2022-12-01 PROCEDURE — 94729 DIFFUSING CAPACITY: CPT

## 2022-12-01 PROCEDURE — 94010 BREATHING CAPACITY TEST: CPT

## 2022-12-01 PROCEDURE — 85018 HEMOGLOBIN: CPT | Mod: QW

## 2022-12-01 PROCEDURE — 99214 OFFICE O/P EST MOD 30 MIN: CPT | Mod: 25

## 2022-12-01 RX ORDER — OMEPRAZOLE 20 MG/1
20 CAPSULE, DELAYED RELEASE ORAL DAILY
Qty: 90 | Refills: 0 | Status: DISCONTINUED | COMMUNITY
Start: 2022-09-22 | End: 2022-12-01

## 2022-12-01 RX ORDER — INSULIN GLARGINE 100 [IU]/ML
100 INJECTION, SOLUTION SUBCUTANEOUS
Refills: 0 | Status: DISCONTINUED | COMMUNITY
End: 2022-12-01

## 2022-12-01 NOTE — HISTORY OF PRESENT ILLNESS
[TextBox_4] : The patient is breathing better since going on Breo and Singulair.  She had a home sleep study but it was technically unsuccessful and needs to be repeated.

## 2022-12-01 NOTE — ASSESSMENT
[FreeTextEntry1] : Patient with findings consistent with severe persistent asthma with airway remodeling.  She is doing well on Breo and Singulair and this will be continued.\par \par Probable obstructive sleep apnea with an unsuccessful home sleep study.  This will be repeated and I will see her back after that.

## 2022-12-01 NOTE — PHYSICAL EXAM
[III] : Mallampati Class: III [No Resp Distress] : no resp distress [Clear to Auscultation Bilaterally] : clear to auscultation bilaterally [TextBox_68] : diminished bs.

## 2022-12-01 NOTE — PROCEDURE
[FreeTextEntry1] : Pulmonary function studies show improvement in the restrictive process of about 20 to 25%.  There is air trapping noted.  Diffusing capacity is normal when corrected for lung volumes.

## 2022-12-03 LAB
ALBUMIN MFR SERPL ELPH: 51.1 %
ALBUMIN SERPL-MCNC: 4 G/DL
ALBUMIN/GLOB SERPL: 1.1 RATIO
ALPHA1 GLOB MFR SERPL ELPH: 3.9 %
ALPHA1 GLOB SERPL ELPH-MCNC: 0.3 G/DL
ALPHA2 GLOB MFR SERPL ELPH: 8.5 %
ALPHA2 GLOB SERPL ELPH-MCNC: 0.7 G/DL
B-GLOBULIN MFR SERPL ELPH: 14.9 %
B-GLOBULIN SERPL ELPH-MCNC: 1.2 G/DL
DEPRECATED KAPPA LC FREE/LAMBDA SER: 1.69 RATIO
GAMMA GLOB FLD ELPH-MCNC: 1.7 G/DL
GAMMA GLOB MFR SERPL ELPH: 21.6 %
IGA SER QL IEP: 353 MG/DL
IGG SER QL IEP: 1705 MG/DL
IGM SER QL IEP: 89 MG/DL
INTERPRETATION SERPL IEP-IMP: NORMAL
KAPPA LC CSF-MCNC: 2.54 MG/DL
KAPPA LC SERPL-MCNC: 4.29 MG/DL
M PROTEIN MFR SERPL ELPH: NORMAL
M PROTEIN SPEC IFE-MCNC: NORMAL
MONOCLON BAND OBS SERPL: NORMAL
PROT SERPL-MCNC: 7.8 G/DL
PROT SERPL-MCNC: 7.8 G/DL

## 2022-12-16 ENCOUNTER — TRANSCRIPTION ENCOUNTER (OUTPATIENT)
Age: 62
End: 2022-12-16

## 2022-12-16 ENCOUNTER — APPOINTMENT (OUTPATIENT)
Dept: ENDOCRINOLOGY | Facility: CLINIC | Age: 62
End: 2022-12-16

## 2022-12-16 VITALS
HEART RATE: 77 BPM | BODY MASS INDEX: 39.37 KG/M2 | HEIGHT: 66 IN | DIASTOLIC BLOOD PRESSURE: 84 MMHG | SYSTOLIC BLOOD PRESSURE: 136 MMHG | WEIGHT: 245 LBS

## 2022-12-16 PROCEDURE — 99214 OFFICE O/P EST MOD 30 MIN: CPT

## 2022-12-16 RX ORDER — INSULIN ASPART 100 [IU]/ML
100 INJECTION, SOLUTION INTRAVENOUS; SUBCUTANEOUS
Qty: 5 | Refills: 0 | Status: DISCONTINUED | COMMUNITY
Start: 2022-04-15 | End: 2022-12-16

## 2022-12-16 RX ORDER — INSULIN LISPRO 100 U/ML
100 INJECTION, SOLUTION SUBCUTANEOUS
Refills: 0 | Status: DISCONTINUED | COMMUNITY
End: 2022-12-16
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
## 2022-12-16 NOTE — ASSESSMENT
[FreeTextEntry1] : T2DM\par -Pt doing extremely well! Will continue to titrate insulin as glucose levels continue to improve. \par -Continue  mg ER daily BID . Take consistently.\par -For now, change Lantus to 30 units daily not BID (not taking consistently) \par -Eliminate Ademlog\par -Would like to add GLP1 next \par -Continue Claus use- does not scan enough- would benefit form LIBRE3 -will facilitate \par -Continue to watch diet\par -Continue to exercise as tolerated, goal of 30 mins a day 5 x a week\par -Continue to follow up with all specialists including ophtho \par \par \par Partial thyroidectomy\par -Continue to monitor TFTs, on no medication for now\par -No need for updated thyroid sonogram at this time\par \par \par HTN\par -BP slightly elevated in office, continue regimen, will continue to monitor \par \par \par Vit D Def\par -Continue daily supplement, levels low normal \par \par Slightly elevated LDL\par -Pt will work on diet/exercise efforts, will recheck with next fasting labs, if not improved may consider statin due to FH of MI. \par \par Fasting labs due 3/2023\par RTO 3 months NP\par RTO 3 months

## 2023-01-30 ENCOUNTER — RX RENEWAL (OUTPATIENT)
Age: 63
End: 2023-01-30

## 2023-03-02 ENCOUNTER — APPOINTMENT (OUTPATIENT)
Dept: RHEUMATOLOGY | Facility: CLINIC | Age: 63
End: 2023-03-02
Payer: COMMERCIAL

## 2023-03-02 VITALS
WEIGHT: 241 LBS | TEMPERATURE: 97.9 F | SYSTOLIC BLOOD PRESSURE: 135 MMHG | BODY MASS INDEX: 38.9 KG/M2 | DIASTOLIC BLOOD PRESSURE: 90 MMHG

## 2023-03-02 DIAGNOSIS — M25.551 PAIN IN RIGHT HIP: ICD-10-CM

## 2023-03-02 DIAGNOSIS — M25.552 PAIN IN RIGHT HIP: ICD-10-CM

## 2023-03-02 DIAGNOSIS — M25.561 PAIN IN RIGHT KNEE: ICD-10-CM

## 2023-03-02 DIAGNOSIS — G89.29 PAIN IN RIGHT KNEE: ICD-10-CM

## 2023-03-02 DIAGNOSIS — M25.562 PAIN IN RIGHT KNEE: ICD-10-CM

## 2023-03-02 PROCEDURE — 81003 URINALYSIS AUTO W/O SCOPE: CPT | Mod: QW

## 2023-03-02 PROCEDURE — 99215 OFFICE O/P EST HI 40 MIN: CPT | Mod: 25

## 2023-03-02 PROCEDURE — 36415 COLL VENOUS BLD VENIPUNCTURE: CPT

## 2023-03-13 LAB
ALBUMIN MFR SERPL ELPH: 50.7 %
ALBUMIN SERPL ELPH-MCNC: 4.1 G/DL
ALBUMIN SERPL-MCNC: 3.9 G/DL
ALBUMIN/GLOB SERPL: 1.1 RATIO
ALP BLD-CCNC: 107 U/L
ALPHA1 GLOB MFR SERPL ELPH: 3.9 %
ALPHA1 GLOB SERPL ELPH-MCNC: 0.3 G/DL
ALPHA2 GLOB MFR SERPL ELPH: 8.2 %
ALPHA2 GLOB SERPL ELPH-MCNC: 0.6 G/DL
ALT SERPL-CCNC: 21 U/L
ANION GAP SERPL CALC-SCNC: 13 MMOL/L
AST SERPL-CCNC: 26 U/L
B-GLOBULIN MFR SERPL ELPH: 14.8 %
B-GLOBULIN SERPL ELPH-MCNC: 1.1 G/DL
BASOPHILS # BLD AUTO: 0.04 K/UL
BASOPHILS NFR BLD AUTO: 0.6 %
BILIRUB SERPL-MCNC: 0.2 MG/DL
BUN SERPL-MCNC: 15 MG/DL
CALCIUM SERPL-MCNC: 9.6 MG/DL
CHLORIDE SERPL-SCNC: 105 MMOL/L
CK SERPL-CCNC: 606 U/L
CO2 SERPL-SCNC: 24 MMOL/L
CREAT SERPL-MCNC: 0.72 MG/DL
CRP SERPL-MCNC: 10 MG/L
DEPRECATED KAPPA LC FREE/LAMBDA SER: 1.5 RATIO
EGFR: 94 ML/MIN/1.73M2
ENA SS-A AB SER IA-ACNC: <0.2 AL
ENA SS-B AB SER IA-ACNC: <0.2 AL
EOSINOPHIL # BLD AUTO: 0.26 K/UL
EOSINOPHIL NFR BLD AUTO: 4.1 %
ERYTHROCYTE [SEDIMENTATION RATE] IN BLOOD BY WESTERGREN METHOD: 39 MM/HR
GAMMA GLOB FLD ELPH-MCNC: 1.7 G/DL
GAMMA GLOB MFR SERPL ELPH: 22.4 %
GLUCOSE SERPL-MCNC: 98 MG/DL
HCT VFR BLD CALC: 38.6 %
HGB BLD-MCNC: 12.3 G/DL
IGA SER QL IEP: 365 MG/DL
IGG SER QL IEP: 1795 MG/DL
IGM SER QL IEP: 82 MG/DL
IMM GRANULOCYTES NFR BLD AUTO: 0.2 %
INTERPRETATION SERPL IEP-IMP: NORMAL
KAPPA LC CSF-MCNC: 2.81 MG/DL
KAPPA LC SERPL-MCNC: 4.22 MG/DL
LDH SERPL-CCNC: 255 U/L
LYMPHOCYTES # BLD AUTO: 1.95 K/UL
LYMPHOCYTES NFR BLD AUTO: 31 %
M PROTEIN MFR SERPL ELPH: NORMAL
M PROTEIN SPEC IFE-MCNC: NORMAL
MAN DIFF?: NORMAL
MCHC RBC-ENTMCNC: 28.9 PG
MCHC RBC-ENTMCNC: 31.9 GM/DL
MCV RBC AUTO: 90.6 FL
MONOCLON BAND OBS SERPL: NORMAL
MONOCYTES # BLD AUTO: 0.34 K/UL
MONOCYTES NFR BLD AUTO: 5.4 %
NEUTROPHILS # BLD AUTO: 3.7 K/UL
NEUTROPHILS NFR BLD AUTO: 58.7 %
PHOSPHATE SERPL-MCNC: 3.3 MG/DL
PLATELET # BLD AUTO: 371 K/UL
POTASSIUM SERPL-SCNC: 4.2 MMOL/L
PROT SERPL-MCNC: 7.6 G/DL
RBC # BLD: 4.26 M/UL
RBC # FLD: 13.6 %
SODIUM SERPL-SCNC: 142 MMOL/L
WBC # FLD AUTO: 6.3 K/UL

## 2023-03-15 ENCOUNTER — NON-APPOINTMENT (OUTPATIENT)
Age: 63
End: 2023-03-15

## 2023-03-24 ENCOUNTER — APPOINTMENT (OUTPATIENT)
Dept: ENDOCRINOLOGY | Facility: CLINIC | Age: 63
End: 2023-03-24
Payer: COMMERCIAL

## 2023-03-24 VITALS
HEIGHT: 66 IN | WEIGHT: 235 LBS | HEART RATE: 72 BPM | BODY MASS INDEX: 37.77 KG/M2 | SYSTOLIC BLOOD PRESSURE: 128 MMHG | DIASTOLIC BLOOD PRESSURE: 82 MMHG

## 2023-03-24 DIAGNOSIS — E89.0 POSTPROCEDURAL HYPOTHYROIDISM: ICD-10-CM

## 2023-03-24 PROCEDURE — 95251 CONT GLUC MNTR ANALYSIS I&R: CPT

## 2023-03-24 PROCEDURE — 99214 OFFICE O/P EST MOD 30 MIN: CPT | Mod: 25

## 2023-03-24 NOTE — REVIEW OF SYSTEMS
[Recent Weight Loss (___ Lbs)] : recent weight loss: [unfilled] lbs [Fatigue] : no fatigue [Visual Field Defect] : no visual field defect [Blurred Vision] : no blurred vision [Dysphagia] : no dysphagia [Neck Pain] : no neck pain [Dysphonia] : no dysphonia [Chest Pain] : no chest pain [Shortness Of Breath] : no shortness of breath [Constipation] : no constipation [Diarrhea] : no diarrhea [Polyuria] : no polyuria [Polydipsia] : no polydipsia

## 2023-03-24 NOTE — ASSESSMENT
[FreeTextEntry1] : T2DM\par -Pt doing extremely well! Will continue to titrate insulin as glucose levels continue to improve. \par -Continue  mg ER daily BID . Take consistently.\par -For now, decrease Lantus to 22 units daily \par -Pt not interested in GLP1 at this time.\par -Continue Claus use\par -Continue to watch diet\par -Continue to exercise as tolerated, goal of 30 mins a day 5 x a week\par -Continue to follow up with all specialists including ophtho \par \par \par Partial thyroidectomy\par -Continue to monitor TFTs, on no medication for now\par -No need for updated thyroid sonogram at this time\par \par \par HTN\par -BP slightly elevated in office, continue regimen, will continue to monitor \par \par \par Vit D Def\par -Continue daily supplement, levels low normal \par \par Slightly elevated LDL\par -Pt will work on diet/exercise efforts, will recheck with next fasting labs, if not improved may consider statin due to FH of MI. \par \par Fasting labs due now\par RTO 3 months NP

## 2023-03-26 LAB
ALBUMIN SERPL ELPH-MCNC: 4.4 G/DL
ALDOLASE SERPL-CCNC: 5.3 U/L
ALP BLD-CCNC: 106 U/L
ALT SERPL-CCNC: 12 U/L
ANION GAP SERPL CALC-SCNC: 14 MMOL/L
AST SERPL-CCNC: 18 U/L
BILIRUB SERPL-MCNC: 0.2 MG/DL
BUN SERPL-MCNC: 15 MG/DL
CALCIUM SERPL-MCNC: 9.4 MG/DL
CHLORIDE SERPL-SCNC: 103 MMOL/L
CK SERPL-CCNC: 139 U/L
CO2 SERPL-SCNC: 24 MMOL/L
CREAT SERPL-MCNC: 0.69 MG/DL
EGFR: 97 ML/MIN/1.73M2
GLUCOSE SERPL-MCNC: 105 MG/DL
POTASSIUM SERPL-SCNC: 4.1 MMOL/L
PROT SERPL-MCNC: 7.8 G/DL
SODIUM SERPL-SCNC: 141 MMOL/L
T GONDII AB SER-IMP: NEGATIVE
T GONDII AB SER-IMP: NEGATIVE
T GONDII IGG SER QL: <3 IU/ML
T GONDII IGM SER QL: <3 AU/ML

## 2023-03-27 ENCOUNTER — NON-APPOINTMENT (OUTPATIENT)
Age: 63
End: 2023-03-27

## 2023-03-27 DIAGNOSIS — Z12.11 ENCOUNTER FOR SCREENING FOR MALIGNANT NEOPLASM OF COLON: ICD-10-CM

## 2023-03-28 ENCOUNTER — APPOINTMENT (OUTPATIENT)
Dept: GASTROENTEROLOGY | Facility: CLINIC | Age: 63
End: 2023-03-28
Payer: COMMERCIAL

## 2023-03-28 ENCOUNTER — NON-APPOINTMENT (OUTPATIENT)
Age: 63
End: 2023-03-28

## 2023-03-28 VITALS
DIASTOLIC BLOOD PRESSURE: 116 MMHG | WEIGHT: 239 LBS | TEMPERATURE: 97.8 F | HEIGHT: 66 IN | BODY MASS INDEX: 38.41 KG/M2 | RESPIRATION RATE: 16 BRPM | SYSTOLIC BLOOD PRESSURE: 206 MMHG

## 2023-03-28 DIAGNOSIS — Z12.11 ENCOUNTER FOR SCREENING FOR MALIGNANT NEOPLASM OF COLON: ICD-10-CM

## 2023-03-28 PROCEDURE — 99203 OFFICE O/P NEW LOW 30 MIN: CPT

## 2023-03-28 NOTE — ASSESSMENT
[FreeTextEntry1] : Impression: Personal history of colon polyps rule out recurrent colonic neoplasm.  Patient's last colonoscopy was roughly 10 years ago.\par \par Recommendations: Repeat colonoscopy was advised for continued colon polyp surveillance.  The risk versus benefits of repeat colonoscopy and intravenous sedation, and alternative testing such as virtual colonoscopy, were individually explained to the patient today who appeared to understand all of the above and was agreeable to proceeding with repeat colonoscopy.  Her ASA classification is 2.  She will be prepped with MiraLAX and Dulcolax tablets and is medically optimized for the proposed colonoscopy and seemed to understand all of the above instructions, information, and management plan.

## 2023-03-28 NOTE — PHYSICAL EXAM
[Alert] : alert [Normal Voice/Communication] : normal voice/communication [Healthy Appearing] : healthy appearing [No Acute Distress] : no acute distress [Well Developed] : well developed [Well Nourished] : well nourished [Obese (BMI >= 30)] : obese (BMI >= 30) [Sclera] : the sclera and conjunctiva were normal [Hearing Threshold Finger Rub Not [place]] : hearing was normal [Normal Lips/Gums] : the lips and gums were normal [Oropharynx] : the oropharynx was normal [Normal Appearance] : the appearance of the neck was normal [No Neck Mass] : no neck mass was observed [No Respiratory Distress] : no respiratory distress [No Acc Muscle Use] : no accessory muscle use [Respiration, Rhythm And Depth] : normal respiratory rhythm and effort [Auscultation Breath Sounds / Voice Sounds] : lungs were clear to auscultation bilaterally [Heart Rate And Rhythm] : heart rate was normal and rhythm regular [Normal S1, S2] : normal S1 and S2 [Murmurs] : no murmurs [None] : no edema [Bowel Sounds] : normal bowel sounds [Abdomen Tenderness] : non-tender [No Masses] : no abdominal mass palpated [Abdomen Soft] : soft [] : no hepatosplenomegaly [No CVA Tenderness] : no CVA  tenderness [Abnormal Walk] : normal gait [No Joint Swelling] : no joint swelling seen [Normal Color / Pigmentation] : normal skin color and pigmentation [Oriented To Time, Place, And Person] : oriented to person, place, and time [de-identified] : Deferred until colonoscopy

## 2023-03-30 NOTE — ASSESSMENT
[FreeTextEntry1] : Impression: JOSE BURKS is a 62 year old woman who  presents in the office for her initial follow-up visit regarding further evaluation of joint symptoms and rheumatic diseases, including osteoarthritis, Sjogren's syndrome, fibromyalgia, and history of vitamin D deficiency.\par \par Note: Patient last seen September, 2022\par \par Patient feels fairly well. Never did PT. Has been going to the gym 4 times a week for one hour (30 minutes cardio). Joint pains, in bilateral hips, knees, and lower legs secondary to her osteoarthritis, especially with prolonged sitting and weightbearing. No erythema or heat. No sleep disturbance and fatigue secondary to fibromyalgia with 6 hours of sleep. Some dry eyes no dry mouth secondary to her Sjogren's syndrome, using artificial tears with relief. Not using biotene mouthwash. The patient continues vitamin D supplements secondary to history of vitamin D deficiency. Patient denies rash or side effects with current medications. Patient is content with current medication regimen. Recent laboratory tests results reveal minimal normal vitamin D levels no significant changes or results, with extensive discussion. \par \par Plan:I reviewed patient’s chart and previous records with extensive discussion\par Laboratory tests ordered today-see list below-with coordination of care\par Diagnosis and prognosis discussed\par Continue other current medications (other than those changed below)\par Discontinue meloxicam for alternative NSAID--patient declined--she wants to continue it\par Increase Vitamin D 3,000 units q.d (possible side effects explained) \par Artificial tears one drop each eye q.i.d. and p.r.n.(Possible side effects explained)\par Biotin mouthwash/spray q.i.d. and p.r.n.(Possible side effects explained)\par Oral hydration\par Patient declined oral medication for dryness\par Daily exercise at least 30 minutes per day--emphasized--extensive discussion\par For next office visit, withhold vitamin D supplements the day of the encounter and  the 2 days prior--to recheck vitamin D level\par Physical therapy--with special attention to both hips and both knees--with coordination of care\par Return visit 4 month

## 2023-03-30 NOTE — ADDENDUM
[FreeTextEntry1] : I, Dedrick Davidson, acted solely as a scribe for Dr. Myron I. Kleiner, MD. on 03/02/2023 .

## 2023-03-30 NOTE — REVIEW OF SYSTEMS
[Dry Eyes] : dryness of the eyes [As Noted in HPI] : as noted in HPI [Joint Pain] : joint pain [Negative] : Heme/Lymph [Recent Weight Loss (___ Lbs)] : recent [unfilled] ~Ulb weight loss [Feeling Tired] : not feeling tired

## 2023-03-30 NOTE — HISTORY OF PRESENT ILLNESS
[FreeTextEntry1] : JOSE BURKS is a 62 year old woman who was presents in the office for her initial follow-up regarding further evaluation of joint symptoms and rheumatic diseases, including osteoarthritis, Sjogren's syndrome, and history of vitamin D deficiency.\par \par Note: Patient last seen September, 2022\par \par Patient feels fairly well. Never did PT. Has been going to the gym 4 times a week for one hour (30 minutes cardio). Joint pains, in bilateral hips, knees, and lower legs, especially with prolonged sitting and weightbearing. No erythema or heat. No sleep disturbance and fatigue with 6 hours of sleep. Some dry eyes no dry mouth, using artificial tears with relief. Not using biotene mouthwash. The patient continues vitamin D supplements. Patient denies rash or side effects with current medications. Patient is content with current medication regimen. \par \par PMH\par Some weight loss 6 pounds, however, regain in weight

## 2023-03-30 NOTE — CONSULT LETTER
[Dear  ___] : Dear  [unfilled], [Consult Letter:] : I had the pleasure of evaluating your patient, [unfilled]. [Please see my note below.] : Please see my note below. [Consult Closing:] : Thank you very much for allowing me to participate in the care of this patient.  If you have any questions, please do not hesitate to contact me. [Sincerely,] : Sincerely, [FreeTextEntry3] : Tristian\par Myron I. Kleiner, M.D., FACR\par Chief, Division of Rheumatology\par Department of Medicine\par [facility]

## 2023-04-01 LAB
ENA JO1 AB SER IA-ACNC: <0.2 AL
HMGCR ANTIBODY, IGG: <3 UNITS

## 2023-04-10 RX ORDER — FLUTICASONE FUROATE AND VILANTEROL TRIFENATATE 200; 25 UG/1; UG/1
200-25 POWDER RESPIRATORY (INHALATION)
Qty: 3 | Refills: 3 | Status: ACTIVE | OUTPATIENT
Start: 2020-10-05

## 2023-04-25 ENCOUNTER — TRANSCRIPTION ENCOUNTER (OUTPATIENT)
Age: 63
End: 2023-04-25

## 2023-05-01 ENCOUNTER — NON-APPOINTMENT (OUTPATIENT)
Age: 63
End: 2023-05-01

## 2023-05-11 ENCOUNTER — APPOINTMENT (OUTPATIENT)
Dept: RHEUMATOLOGY | Facility: CLINIC | Age: 63
End: 2023-05-11

## 2023-05-20 LAB

## 2023-05-23 ENCOUNTER — APPOINTMENT (OUTPATIENT)
Dept: PULMONOLOGY | Facility: CLINIC | Age: 63
End: 2023-05-23

## 2023-06-19 ENCOUNTER — TRANSCRIPTION ENCOUNTER (OUTPATIENT)
Age: 63
End: 2023-06-19

## 2023-06-20 ENCOUNTER — APPOINTMENT (OUTPATIENT)
Dept: GASTROENTEROLOGY | Facility: GI CENTER | Age: 63
End: 2023-06-20
Payer: COMMERCIAL

## 2023-06-20 ENCOUNTER — OUTPATIENT (OUTPATIENT)
Dept: OUTPATIENT SERVICES | Facility: HOSPITAL | Age: 63
LOS: 1 days | End: 2023-06-20
Payer: COMMERCIAL

## 2023-06-20 DIAGNOSIS — Z98.89 OTHER SPECIFIED POSTPROCEDURAL STATES: Chronic | ICD-10-CM

## 2023-06-20 DIAGNOSIS — Z86.010 PERSONAL HISTORY OF COLONIC POLYPS: ICD-10-CM

## 2023-06-20 DIAGNOSIS — Z90.49 ACQUIRED ABSENCE OF OTHER SPECIFIED PARTS OF DIGESTIVE TRACT: Chronic | ICD-10-CM

## 2023-06-20 DIAGNOSIS — K57.30 DIVERTICULOSIS OF LARGE INTESTINE W/OUT PERFORATION OR ABSCESS W/OUT BLEEDING: ICD-10-CM

## 2023-06-20 DIAGNOSIS — Z90.710 ACQUIRED ABSENCE OF BOTH CERVIX AND UTERUS: Chronic | ICD-10-CM

## 2023-06-20 LAB — GLUCOSE BLDC GLUCOMTR-MCNC: 133 MG/DL — HIGH (ref 70–99)

## 2023-06-20 PROCEDURE — 45378 DIAGNOSTIC COLONOSCOPY: CPT

## 2023-06-20 PROCEDURE — 82962 GLUCOSE BLOOD TEST: CPT

## 2023-06-20 PROCEDURE — 45378 DIAGNOSTIC COLONOSCOPY: CPT | Mod: 53,33

## 2023-06-20 NOTE — PHYSICAL EXAM
[Alert] : alert [Normal Voice/Communication] : normal voice/communication [Healthy Appearing] : healthy appearing [No Acute Distress] : no acute distress [Well Developed] : well developed [Well Nourished] : well nourished [Obese (BMI >= 30)] : obese (BMI >= 30) [Sclera] : the sclera and conjunctiva were normal [Hearing Threshold Finger Rub Not [place]] : hearing was normal [Normal Lips/Gums] : the lips and gums were normal [Oropharynx] : the oropharynx was normal [Normal Appearance] : the appearance of the neck was normal [No Neck Mass] : no neck mass was observed [No Respiratory Distress] : no respiratory distress [No Acc Muscle Use] : no accessory muscle use [Respiration, Rhythm And Depth] : normal respiratory rhythm and effort [Auscultation Breath Sounds / Voice Sounds] : lungs were clear to auscultation bilaterally [Heart Rate And Rhythm] : heart rate was normal and rhythm regular [Normal S1, S2] : normal S1 and S2 [Murmurs] : no murmurs [None] : no edema [Bowel Sounds] : normal bowel sounds [Abdomen Tenderness] : non-tender [No Masses] : no abdominal mass palpated [Abdomen Soft] : soft [] : no hepatosplenomegaly [No CVA Tenderness] : no CVA  tenderness [Abnormal Walk] : normal gait [No Joint Swelling] : no joint swelling seen [Normal Color / Pigmentation] : normal skin color and pigmentation [Oriented To Time, Place, And Person] : oriented to person, place, and time [de-identified] : Deferred until colonoscopy

## 2023-06-30 ENCOUNTER — APPOINTMENT (OUTPATIENT)
Dept: ENDOCRINOLOGY | Facility: CLINIC | Age: 63
End: 2023-06-30

## 2023-07-05 ENCOUNTER — APPOINTMENT (OUTPATIENT)
Dept: RHEUMATOLOGY | Facility: CLINIC | Age: 63
End: 2023-07-05
Payer: COMMERCIAL

## 2023-07-05 VITALS — HEIGHT: 66 IN | TEMPERATURE: 97.3 F | DIASTOLIC BLOOD PRESSURE: 86 MMHG | SYSTOLIC BLOOD PRESSURE: 126 MMHG

## 2023-07-05 DIAGNOSIS — M79.7 FIBROMYALGIA: ICD-10-CM

## 2023-07-05 DIAGNOSIS — M21.961 UNSPECIFIED ACQUIRED DEFORMITY OF RIGHT LOWER LEG: ICD-10-CM

## 2023-07-05 DIAGNOSIS — M21.952 UNSPECIFIED ACQUIRED DEFORMITY OF RIGHT THIGH: ICD-10-CM

## 2023-07-05 DIAGNOSIS — H04.123 DRY EYE SYNDROME OF BILATERAL LACRIMAL GLANDS: ICD-10-CM

## 2023-07-05 DIAGNOSIS — M21.962 UNSPECIFIED ACQUIRED DEFORMITY OF RIGHT LOWER LEG: ICD-10-CM

## 2023-07-05 DIAGNOSIS — M35.00 SICCA SYNDROME, UNSPECIFIED: ICD-10-CM

## 2023-07-05 DIAGNOSIS — M15.9 POLYOSTEOARTHRITIS, UNSPECIFIED: ICD-10-CM

## 2023-07-05 DIAGNOSIS — M21.951 UNSPECIFIED ACQUIRED DEFORMITY OF RIGHT THIGH: ICD-10-CM

## 2023-07-05 PROCEDURE — 99417 PROLNG OP E/M EACH 15 MIN: CPT | Mod: 25

## 2023-07-05 PROCEDURE — 99215 OFFICE O/P EST HI 40 MIN: CPT | Mod: 25

## 2023-07-05 RX ORDER — INSULIN DETEMIR 100 [IU]/ML
100 INJECTION, SOLUTION SUBCUTANEOUS
Qty: 6 | Refills: 2 | Status: DISCONTINUED | COMMUNITY
Start: 2022-04-15 | End: 2023-07-05

## 2023-07-05 RX ORDER — PEN NEEDLE, DIABETIC 29 G X1/2"
32G X 4 MM NEEDLE, DISPOSABLE MISCELLANEOUS
Qty: 1 | Refills: 1 | Status: DISCONTINUED | COMMUNITY
Start: 2022-04-14 | End: 2023-07-05

## 2023-07-05 RX ORDER — POLYETHYLENE GLYCOL 3350 AND ELECTROLYTES WITH LEMON FLAVOR 236; 22.74; 6.74; 5.86; 2.97 G/4L; G/4L; G/4L; G/4L; G/4L
236 POWDER, FOR SOLUTION ORAL
Qty: 1 | Refills: 0 | Status: DISCONTINUED | COMMUNITY
Start: 2023-06-20 | End: 2023-07-05

## 2023-07-27 LAB
ALBUMIN SERPL ELPH-MCNC: 4.5 G/DL
ALP BLD-CCNC: 124 U/L
ALT SERPL-CCNC: 16 U/L
ANION GAP SERPL CALC-SCNC: 16 MMOL/L
APPEARANCE: CLEAR
AST SERPL-CCNC: 23 U/L
BACTERIA: NEGATIVE /HPF
BILIRUB SERPL-MCNC: 0.2 MG/DL
BILIRUBIN URINE: NEGATIVE
BLOOD URINE: NEGATIVE
BUN SERPL-MCNC: 16 MG/DL
CALCIUM SERPL-MCNC: 9.5 MG/DL
CAST: 0 /LPF
CHLORIDE SERPL-SCNC: 97 MMOL/L
CK SERPL-CCNC: 166 U/L
CO2 SERPL-SCNC: 24 MMOL/L
COLOR: YELLOW
CREAT SERPL-MCNC: 0.69 MG/DL
CRP SERPL-MCNC: 14 MG/L
EGFR: 97 ML/MIN/1.73M2
ENA SS-A AB SER IA-ACNC: <0.2 AL
ENA SS-B AB SER IA-ACNC: <0.2 AL
EPITHELIAL CELLS: 3 /HPF
ERYTHROCYTE [SEDIMENTATION RATE] IN BLOOD BY WESTERGREN METHOD: 76 MM/HR
GLUCOSE QUALITATIVE U: NEGATIVE MG/DL
GLUCOSE SERPL-MCNC: 78 MG/DL
KETONES URINE: NEGATIVE MG/DL
LDH SERPL-CCNC: 259 U/L
LEUKOCYTE ESTERASE URINE: ABNORMAL
MICROSCOPIC-UA: NORMAL
NITRITE URINE: NEGATIVE
PH URINE: 6
PHOSPHATE SERPL-MCNC: 3.5 MG/DL
POTASSIUM SERPL-SCNC: 4 MMOL/L
PROT SERPL-MCNC: 8.1 G/DL
PROTEIN URINE: NEGATIVE MG/DL
RED BLOOD CELLS URINE: 2 /HPF
SODIUM SERPL-SCNC: 138 MMOL/L
SPECIFIC GRAVITY URINE: 1.01
UROBILINOGEN URINE: 0.2 MG/DL
WHITE BLOOD CELLS URINE: 4 /HPF

## 2023-07-27 RX ORDER — MELOXICAM 15 MG/1
15 TABLET ORAL
Qty: 90 | Refills: 0 | Status: DISCONTINUED | COMMUNITY
Start: 2022-09-22 | End: 2023-07-27

## 2023-07-27 RX ORDER — MELATONIN 3 MG
25 MCG TABLET ORAL
Qty: 100 | Refills: 0 | Status: ACTIVE | COMMUNITY
Start: 2023-07-27

## 2023-07-27 NOTE — ADDENDUM
[FreeTextEntry1] : I, Dedrick Mary Betha, acted solely as a scribe for Dr. Myron I. Kleiner, MD. on 07/05/2023 .

## 2023-07-27 NOTE — ASSESSMENT
[FreeTextEntry1] : Impression: JOSE BURKS is a 63 year old woman who  presents in the office for her initial follow-up visit regarding further evaluation of joint symptoms and rheumatic diseases, including osteoarthritis, Sjogren's syndrome, fibromyalgia, and history of vitamin D deficiency.\par \par Patient feels fairly well. Denies recent joint pain with her osteoarthritis and fibromyalgia being under good control at this time. Denies recent sleep disturbance and fatigue secondary to fibromyalgia. Some secondary to Sjogren's syndrome dry eyes and dry mouth, using artificial tear and oral hydration, with adequate relief. Not using biotene mouthwash, by mistake. When she was doing PT, it was helpful. The patient continues vitamin D supplements for history of vitamin D deficiency- never increased the dose from 2,000 units to 3,000 units as discussed in previous visit. Recent laboratory tests results reveal low normal vitamin D levels, otherwise normal, with extensive discussion. Patient denies rash or side effects with current medications. Patient is content with current medication regimen. \par \par Plan:I reviewed patient’s chart and previous records with extensive discussion\par Laboratory tests ordered today-see list below-with coordination of care\par For next office visit, withhold vitamin D supplements the day of the encounter and  the 2 days prior--to recheck vitamin D level\par Diagnosis and prognosis discussed\par Continue other current medications (other than those changed below)\par As previously discussed, increase OTC vitamin D 3000 units once daily (Possible side effects explained)\par Discontinue Meloxicam - restart if patient gets recurrent joint pains (Possible side effects explained including cardiovascular risk/MI/CVA)\par Artificial tears one drop each eye q.i.d. and p.r.n.(Possible side effects explained)\par Biotin mouthwash/spray q.i.d. and p.r.n.(Possible side effects explained)\par Oral hydration\par Patient declined oral medication for dryness\par Daily exercise at least 30 minutes per day--emphasized--extensive discussion\par Continue PT \par Return visit 4 month\par All questions and concerns were addressed \par Total time for this office visit, including face-to-face time and non-face-to-face time, 85 minutes--- including review of the chart and previous records, review of previous lab results with extensive discussion with the patient, ordering lab tests with coordination of care, review of previous t imaging reports/x-ray resultst,, detailed medication history, review of medications going forward with their possible side effects, reviewed the impact of the patient's rheumatic disease on their other medical problems, reviewed the impact of the patient's other medical problems on their rheumatic disease

## 2023-07-27 NOTE — REVIEW OF SYSTEMS
[Recent Weight Loss (___ Lbs)] : recent [unfilled] ~Ulb weight loss [Dry Eyes] : dryness of the eyes [As Noted in HPI] : as noted in HPI [Joint Pain] : joint pain [Negative] : Heme/Lymph [Feeling Tired] : not feeling tired

## 2023-07-27 NOTE — HISTORY OF PRESENT ILLNESS
[FreeTextEntry1] : JOSE BURKS is a 63 year old woman who was presents in the office for her initial follow-up regarding further evaluation of joint symptoms and rheumatic diseases, including osteoarthritis, Sjogren's syndrome, and history of vitamin D deficiency.\par \par Patient feels fairly well. Denies recent joint pain. Denies recent sleep disturbance and fatigue. Some dry eyes and dry mouth, using artificial tear and oral hydration, with adequate relief. Not using biotene mouthwash, by mistake. When she was doing PT, it was helpful. The patient continues vitamin D supplements - never increased the dose from 2,000 units to 3,000 units as discussed in previous visit. Patient denies rash or side effects with current medications. Patient is content with current medication regimen. \par \par

## 2023-07-27 NOTE — CONSULT LETTER
[Dear  ___] : Dear  [unfilled], [Consult Letter:] : I had the pleasure of evaluating your patient, [unfilled]. [Please see my note below.] : Please see my note below. [Consult Closing:] : Thank you very much for allowing me to participate in the care of this patient.  If you have any questions, please do not hesitate to contact me. [Sincerely,] : Sincerely, [FreeTextEntry3] : Tristian\par Myron I. Kleiner, M.D., FACR\par Chief, Division of Rheumatology\par Department of Medicine\par [facility]

## 2023-07-27 NOTE — PHYSICAL EXAM
[General Appearance - Alert] : alert [General Appearance - In No Acute Distress] : in no acute distress [General Appearance - Well Nourished] : well nourished [General Appearance - Well Developed] : well developed [General Appearance - Well-Appearing] : healthy appearing [Sclera] : the sclera and conjunctiva were normal [PERRL With Normal Accommodation] : pupils were equal in size, round, and reactive to light [Extraocular Movements] : extraocular movements were intact [Outer Ear] : the ears and nose were normal in appearance [Oropharynx] : the oropharynx was normal [Neck Appearance] : the appearance of the neck was normal [Neck Cervical Mass (___cm)] : no neck mass was observed [Jugular Venous Distention Increased] : there was no jugular-venous distention [Thyroid Diffuse Enlargement] : the thyroid was not enlarged [Thyroid Nodule] : there were no palpable thyroid nodules [Lungs Percussion] : the lungs were normal to percussion [Heart Rate And Rhythm] : heart rate was normal and rhythm regular [Edema] : there was no peripheral edema [Abdomen Soft] : soft [Abdomen Tenderness] : non-tender [Abdomen Mass (___ Cm)] : no abdominal mass palpated [Cervical Lymph Nodes Enlarged Posterior Bilaterally] : posterior cervical [Cervical Lymph Nodes Enlarged Anterior Bilaterally] : anterior cervical [Supraclavicular Lymph Nodes Enlarged Bilaterally] : supraclavicular [Axillary Lymph Nodes Enlarged Bilaterally] : axillary [No CVA Tenderness] : no ~M costovertebral angle tenderness [No Spinal Tenderness] : no spinal tenderness [Skin Color & Pigmentation] : normal skin color and pigmentation [Skin Turgor] : normal skin turgor [] : no rash [Cranial Nerves] : cranial nerves 2-12 were intact [Deep Tendon Reflexes (DTR)] : deep tendon reflexes were 2+ and symmetric [Sensation] : the sensory exam was normal to light touch and pinprick [Motor Exam] : the motor exam was normal [No Focal Deficits] : no focal deficits [Oriented To Time, Place, And Person] : oriented to person, place, and time [Impaired Insight] : insight and judgment were intact [Affect] : the affect was normal [Mood] : the mood was normal [FreeTextEntry1] : Strength 5/5

## 2023-09-14 ENCOUNTER — APPOINTMENT (OUTPATIENT)
Dept: GASTROENTEROLOGY | Facility: GI CENTER | Age: 63
End: 2023-09-14

## 2023-10-06 ENCOUNTER — APPOINTMENT (OUTPATIENT)
Dept: ENDOCRINOLOGY | Facility: CLINIC | Age: 63
End: 2023-10-06

## 2023-10-31 ENCOUNTER — APPOINTMENT (OUTPATIENT)
Dept: CARDIOLOGY | Facility: CLINIC | Age: 63
End: 2023-10-31
Payer: COMMERCIAL

## 2023-10-31 VITALS
HEIGHT: 66 IN | SYSTOLIC BLOOD PRESSURE: 134 MMHG | WEIGHT: 230 LBS | DIASTOLIC BLOOD PRESSURE: 82 MMHG | RESPIRATION RATE: 98 BRPM | BODY MASS INDEX: 36.96 KG/M2 | HEART RATE: 91 BPM

## 2023-10-31 DIAGNOSIS — R06.09 OTHER FORMS OF DYSPNEA: ICD-10-CM

## 2023-10-31 PROCEDURE — 93000 ELECTROCARDIOGRAM COMPLETE: CPT

## 2023-10-31 PROCEDURE — 99204 OFFICE O/P NEW MOD 45 MIN: CPT

## 2023-10-31 RX ORDER — INSULIN LISPRO 100 U/ML
100 INJECTION, SOLUTION INTRAVENOUS; SUBCUTANEOUS
Qty: 5 | Refills: 1 | Status: DISCONTINUED | COMMUNITY
Start: 2022-04-14 | End: 2023-10-31

## 2023-10-31 RX ORDER — AMLODIPINE BESYLATE 10 MG/1
10 TABLET ORAL
Qty: 90 | Refills: 0 | Status: ACTIVE | COMMUNITY
Start: 2022-11-03

## 2023-11-10 ENCOUNTER — EMERGENCY (EMERGENCY)
Facility: HOSPITAL | Age: 63
LOS: 1 days | Discharge: HOME | End: 2023-11-10
Attending: EMERGENCY MEDICINE
Payer: COMMERCIAL

## 2023-11-10 VITALS
DIASTOLIC BLOOD PRESSURE: 82 MMHG | HEART RATE: 62 BPM | SYSTOLIC BLOOD PRESSURE: 196 MMHG | OXYGEN SATURATION: 96 % | RESPIRATION RATE: 18 BRPM | TEMPERATURE: 98 F | WEIGHT: 229.94 LBS | HEIGHT: 67 IN

## 2023-11-10 VITALS
RESPIRATION RATE: 19 BRPM | DIASTOLIC BLOOD PRESSURE: 80 MMHG | OXYGEN SATURATION: 99 % | HEART RATE: 80 BPM | SYSTOLIC BLOOD PRESSURE: 180 MMHG | TEMPERATURE: 98 F

## 2023-11-10 DIAGNOSIS — Z90.710 ACQUIRED ABSENCE OF BOTH CERVIX AND UTERUS: Chronic | ICD-10-CM

## 2023-11-10 DIAGNOSIS — Z90.49 ACQUIRED ABSENCE OF OTHER SPECIFIED PARTS OF DIGESTIVE TRACT: Chronic | ICD-10-CM

## 2023-11-10 DIAGNOSIS — Z98.89 OTHER SPECIFIED POSTPROCEDURAL STATES: Chronic | ICD-10-CM

## 2023-11-10 LAB
A1C WITH ESTIMATED AVERAGE GLUCOSE RESULT: 12.6 % — HIGH (ref 4–5.6)
A1C WITH ESTIMATED AVERAGE GLUCOSE RESULT: 12.6 % — HIGH (ref 4–5.6)
ALBUMIN SERPL ELPH-MCNC: 3.8 G/DL — SIGNIFICANT CHANGE UP (ref 3.3–5.2)
ALBUMIN SERPL ELPH-MCNC: 3.8 G/DL — SIGNIFICANT CHANGE UP (ref 3.3–5.2)
ALP SERPL-CCNC: 134 U/L — HIGH (ref 40–120)
ALP SERPL-CCNC: 134 U/L — HIGH (ref 40–120)
ALT FLD-CCNC: 20 U/L — SIGNIFICANT CHANGE UP
ALT FLD-CCNC: 20 U/L — SIGNIFICANT CHANGE UP
ANION GAP SERPL CALC-SCNC: 11 MMOL/L — SIGNIFICANT CHANGE UP (ref 5–17)
ANION GAP SERPL CALC-SCNC: 11 MMOL/L — SIGNIFICANT CHANGE UP (ref 5–17)
APPEARANCE UR: CLEAR — SIGNIFICANT CHANGE UP
APPEARANCE UR: CLEAR — SIGNIFICANT CHANGE UP
AST SERPL-CCNC: 33 U/L — HIGH
AST SERPL-CCNC: 33 U/L — HIGH
BASE EXCESS BLDV CALC-SCNC: 0.2 MMOL/L — SIGNIFICANT CHANGE UP (ref -2–3)
BASE EXCESS BLDV CALC-SCNC: 0.2 MMOL/L — SIGNIFICANT CHANGE UP (ref -2–3)
BASOPHILS # BLD AUTO: 0.03 K/UL — SIGNIFICANT CHANGE UP (ref 0–0.2)
BASOPHILS # BLD AUTO: 0.03 K/UL — SIGNIFICANT CHANGE UP (ref 0–0.2)
BASOPHILS NFR BLD AUTO: 0.6 % — SIGNIFICANT CHANGE UP (ref 0–2)
BASOPHILS NFR BLD AUTO: 0.6 % — SIGNIFICANT CHANGE UP (ref 0–2)
BILIRUB SERPL-MCNC: 0.3 MG/DL — LOW (ref 0.4–2)
BILIRUB SERPL-MCNC: 0.3 MG/DL — LOW (ref 0.4–2)
BILIRUB UR-MCNC: NEGATIVE — SIGNIFICANT CHANGE UP
BILIRUB UR-MCNC: NEGATIVE — SIGNIFICANT CHANGE UP
BUN SERPL-MCNC: 12.6 MG/DL — SIGNIFICANT CHANGE UP (ref 8–20)
BUN SERPL-MCNC: 12.6 MG/DL — SIGNIFICANT CHANGE UP (ref 8–20)
CALCIUM SERPL-MCNC: 9.1 MG/DL — SIGNIFICANT CHANGE UP (ref 8.4–10.5)
CALCIUM SERPL-MCNC: 9.1 MG/DL — SIGNIFICANT CHANGE UP (ref 8.4–10.5)
CHLORIDE SERPL-SCNC: 100 MMOL/L — SIGNIFICANT CHANGE UP (ref 96–108)
CHLORIDE SERPL-SCNC: 100 MMOL/L — SIGNIFICANT CHANGE UP (ref 96–108)
CO2 SERPL-SCNC: 24 MMOL/L — SIGNIFICANT CHANGE UP (ref 22–29)
CO2 SERPL-SCNC: 24 MMOL/L — SIGNIFICANT CHANGE UP (ref 22–29)
COLOR SPEC: YELLOW — SIGNIFICANT CHANGE UP
COLOR SPEC: YELLOW — SIGNIFICANT CHANGE UP
CREAT SERPL-MCNC: 0.66 MG/DL — SIGNIFICANT CHANGE UP (ref 0.5–1.3)
CREAT SERPL-MCNC: 0.66 MG/DL — SIGNIFICANT CHANGE UP (ref 0.5–1.3)
DIFF PNL FLD: NEGATIVE — SIGNIFICANT CHANGE UP
DIFF PNL FLD: NEGATIVE — SIGNIFICANT CHANGE UP
EGFR: 98 ML/MIN/1.73M2 — SIGNIFICANT CHANGE UP
EGFR: 98 ML/MIN/1.73M2 — SIGNIFICANT CHANGE UP
EOSINOPHIL # BLD AUTO: 0.2 K/UL — SIGNIFICANT CHANGE UP (ref 0–0.5)
EOSINOPHIL # BLD AUTO: 0.2 K/UL — SIGNIFICANT CHANGE UP (ref 0–0.5)
EOSINOPHIL NFR BLD AUTO: 4 % — SIGNIFICANT CHANGE UP (ref 0–6)
EOSINOPHIL NFR BLD AUTO: 4 % — SIGNIFICANT CHANGE UP (ref 0–6)
ESTIMATED AVERAGE GLUCOSE: 315 MG/DL — HIGH (ref 68–114)
ESTIMATED AVERAGE GLUCOSE: 315 MG/DL — HIGH (ref 68–114)
GLUCOSE SERPL-MCNC: 289 MG/DL — HIGH (ref 70–99)
GLUCOSE SERPL-MCNC: 289 MG/DL — HIGH (ref 70–99)
GLUCOSE UR QL: >=1000 MG/DL
GLUCOSE UR QL: >=1000 MG/DL
HCG SERPL-ACNC: <4 MIU/ML — SIGNIFICANT CHANGE UP
HCG SERPL-ACNC: <4 MIU/ML — SIGNIFICANT CHANGE UP
HCO3 BLDV-SCNC: 27 MMOL/L — SIGNIFICANT CHANGE UP (ref 22–29)
HCO3 BLDV-SCNC: 27 MMOL/L — SIGNIFICANT CHANGE UP (ref 22–29)
HCT VFR BLD CALC: 41.4 % — SIGNIFICANT CHANGE UP (ref 34.5–45)
HCT VFR BLD CALC: 41.4 % — SIGNIFICANT CHANGE UP (ref 34.5–45)
HGB BLD-MCNC: 13.4 G/DL — SIGNIFICANT CHANGE UP (ref 11.5–15.5)
HGB BLD-MCNC: 13.4 G/DL — SIGNIFICANT CHANGE UP (ref 11.5–15.5)
IMM GRANULOCYTES NFR BLD AUTO: 0.2 % — SIGNIFICANT CHANGE UP (ref 0–0.9)
IMM GRANULOCYTES NFR BLD AUTO: 0.2 % — SIGNIFICANT CHANGE UP (ref 0–0.9)
KETONES UR-MCNC: 15 MG/DL
KETONES UR-MCNC: 15 MG/DL
LEUKOCYTE ESTERASE UR-ACNC: NEGATIVE — SIGNIFICANT CHANGE UP
LEUKOCYTE ESTERASE UR-ACNC: NEGATIVE — SIGNIFICANT CHANGE UP
LYMPHOCYTES # BLD AUTO: 1.96 K/UL — SIGNIFICANT CHANGE UP (ref 1–3.3)
LYMPHOCYTES # BLD AUTO: 1.96 K/UL — SIGNIFICANT CHANGE UP (ref 1–3.3)
LYMPHOCYTES # BLD AUTO: 39.4 % — SIGNIFICANT CHANGE UP (ref 13–44)
LYMPHOCYTES # BLD AUTO: 39.4 % — SIGNIFICANT CHANGE UP (ref 13–44)
MAGNESIUM SERPL-MCNC: 1.7 MG/DL — LOW (ref 1.8–2.6)
MAGNESIUM SERPL-MCNC: 1.7 MG/DL — LOW (ref 1.8–2.6)
MCHC RBC-ENTMCNC: 28.1 PG — SIGNIFICANT CHANGE UP (ref 27–34)
MCHC RBC-ENTMCNC: 28.1 PG — SIGNIFICANT CHANGE UP (ref 27–34)
MCHC RBC-ENTMCNC: 32.4 GM/DL — SIGNIFICANT CHANGE UP (ref 32–36)
MCHC RBC-ENTMCNC: 32.4 GM/DL — SIGNIFICANT CHANGE UP (ref 32–36)
MCV RBC AUTO: 86.8 FL — SIGNIFICANT CHANGE UP (ref 80–100)
MCV RBC AUTO: 86.8 FL — SIGNIFICANT CHANGE UP (ref 80–100)
MONOCYTES # BLD AUTO: 0.28 K/UL — SIGNIFICANT CHANGE UP (ref 0–0.9)
MONOCYTES # BLD AUTO: 0.28 K/UL — SIGNIFICANT CHANGE UP (ref 0–0.9)
MONOCYTES NFR BLD AUTO: 5.6 % — SIGNIFICANT CHANGE UP (ref 2–14)
MONOCYTES NFR BLD AUTO: 5.6 % — SIGNIFICANT CHANGE UP (ref 2–14)
NEUTROPHILS # BLD AUTO: 2.5 K/UL — SIGNIFICANT CHANGE UP (ref 1.8–7.4)
NEUTROPHILS # BLD AUTO: 2.5 K/UL — SIGNIFICANT CHANGE UP (ref 1.8–7.4)
NEUTROPHILS NFR BLD AUTO: 50.2 % — SIGNIFICANT CHANGE UP (ref 43–77)
NEUTROPHILS NFR BLD AUTO: 50.2 % — SIGNIFICANT CHANGE UP (ref 43–77)
NITRITE UR-MCNC: NEGATIVE — SIGNIFICANT CHANGE UP
NITRITE UR-MCNC: NEGATIVE — SIGNIFICANT CHANGE UP
OSMOLALITY SERPL: 304 MOSMOL/KG — HIGH (ref 280–301)
OSMOLALITY SERPL: 304 MOSMOL/KG — HIGH (ref 280–301)
PCO2 BLDV: 54 MMHG — HIGH (ref 39–42)
PCO2 BLDV: 54 MMHG — HIGH (ref 39–42)
PH BLDV: 7.3 — LOW (ref 7.32–7.43)
PH BLDV: 7.3 — LOW (ref 7.32–7.43)
PH UR: 6 — SIGNIFICANT CHANGE UP (ref 5–8)
PH UR: 6 — SIGNIFICANT CHANGE UP (ref 5–8)
PHOSPHATE SERPL-MCNC: 3.1 MG/DL — SIGNIFICANT CHANGE UP (ref 2.4–4.7)
PHOSPHATE SERPL-MCNC: 3.1 MG/DL — SIGNIFICANT CHANGE UP (ref 2.4–4.7)
PLATELET # BLD AUTO: 284 K/UL — SIGNIFICANT CHANGE UP (ref 150–400)
PLATELET # BLD AUTO: 284 K/UL — SIGNIFICANT CHANGE UP (ref 150–400)
PO2 BLDV: 48 MMHG — HIGH (ref 25–45)
PO2 BLDV: 48 MMHG — HIGH (ref 25–45)
POTASSIUM SERPL-MCNC: 4.1 MMOL/L — SIGNIFICANT CHANGE UP (ref 3.5–5.3)
POTASSIUM SERPL-MCNC: 4.1 MMOL/L — SIGNIFICANT CHANGE UP (ref 3.5–5.3)
POTASSIUM SERPL-SCNC: 4.1 MMOL/L — SIGNIFICANT CHANGE UP (ref 3.5–5.3)
POTASSIUM SERPL-SCNC: 4.1 MMOL/L — SIGNIFICANT CHANGE UP (ref 3.5–5.3)
PROT SERPL-MCNC: 7.4 G/DL — SIGNIFICANT CHANGE UP (ref 6.6–8.7)
PROT SERPL-MCNC: 7.4 G/DL — SIGNIFICANT CHANGE UP (ref 6.6–8.7)
PROT UR-MCNC: NEGATIVE MG/DL — SIGNIFICANT CHANGE UP
PROT UR-MCNC: NEGATIVE MG/DL — SIGNIFICANT CHANGE UP
RBC # BLD: 4.77 M/UL — SIGNIFICANT CHANGE UP (ref 3.8–5.2)
RBC # BLD: 4.77 M/UL — SIGNIFICANT CHANGE UP (ref 3.8–5.2)
RBC # FLD: 12.8 % — SIGNIFICANT CHANGE UP (ref 10.3–14.5)
RBC # FLD: 12.8 % — SIGNIFICANT CHANGE UP (ref 10.3–14.5)
SAO2 % BLDV: 77.1 % — SIGNIFICANT CHANGE UP
SAO2 % BLDV: 77.1 % — SIGNIFICANT CHANGE UP
SODIUM SERPL-SCNC: 135 MMOL/L — SIGNIFICANT CHANGE UP (ref 135–145)
SODIUM SERPL-SCNC: 135 MMOL/L — SIGNIFICANT CHANGE UP (ref 135–145)
SP GR SPEC: 1.01 — SIGNIFICANT CHANGE UP (ref 1–1.03)
SP GR SPEC: 1.01 — SIGNIFICANT CHANGE UP (ref 1–1.03)
UROBILINOGEN FLD QL: 0.2 MG/DL — SIGNIFICANT CHANGE UP (ref 0.2–1)
UROBILINOGEN FLD QL: 0.2 MG/DL — SIGNIFICANT CHANGE UP (ref 0.2–1)
WBC # BLD: 4.98 K/UL — SIGNIFICANT CHANGE UP (ref 3.8–10.5)
WBC # BLD: 4.98 K/UL — SIGNIFICANT CHANGE UP (ref 3.8–10.5)
WBC # FLD AUTO: 4.98 K/UL — SIGNIFICANT CHANGE UP (ref 3.8–10.5)
WBC # FLD AUTO: 4.98 K/UL — SIGNIFICANT CHANGE UP (ref 3.8–10.5)

## 2023-11-10 PROCEDURE — 83930 ASSAY OF BLOOD OSMOLALITY: CPT

## 2023-11-10 PROCEDURE — 81003 URINALYSIS AUTO W/O SCOPE: CPT

## 2023-11-10 PROCEDURE — 36415 COLL VENOUS BLD VENIPUNCTURE: CPT

## 2023-11-10 PROCEDURE — 82803 BLOOD GASES ANY COMBINATION: CPT

## 2023-11-10 PROCEDURE — 82010 KETONE BODYS QUAN: CPT

## 2023-11-10 PROCEDURE — 93010 ELECTROCARDIOGRAM REPORT: CPT

## 2023-11-10 PROCEDURE — 84100 ASSAY OF PHOSPHORUS: CPT

## 2023-11-10 PROCEDURE — 85025 COMPLETE CBC W/AUTO DIFF WBC: CPT

## 2023-11-10 PROCEDURE — 84702 CHORIONIC GONADOTROPIN TEST: CPT

## 2023-11-10 PROCEDURE — 93005 ELECTROCARDIOGRAM TRACING: CPT

## 2023-11-10 PROCEDURE — 83735 ASSAY OF MAGNESIUM: CPT

## 2023-11-10 PROCEDURE — 71045 X-RAY EXAM CHEST 1 VIEW: CPT | Mod: 26

## 2023-11-10 PROCEDURE — 83036 HEMOGLOBIN GLYCOSYLATED A1C: CPT

## 2023-11-10 PROCEDURE — 99284 EMERGENCY DEPT VISIT MOD MDM: CPT

## 2023-11-10 PROCEDURE — 71045 X-RAY EXAM CHEST 1 VIEW: CPT

## 2023-11-10 PROCEDURE — 80053 COMPREHEN METABOLIC PANEL: CPT

## 2023-11-10 PROCEDURE — 99285 EMERGENCY DEPT VISIT HI MDM: CPT | Mod: 25

## 2023-11-10 PROCEDURE — 82962 GLUCOSE BLOOD TEST: CPT

## 2023-11-10 RX ORDER — MAGNESIUM OXIDE 400 MG ORAL TABLET 241.3 MG
400 TABLET ORAL ONCE
Refills: 0 | Status: COMPLETED | OUTPATIENT
Start: 2023-11-10 | End: 2023-11-10

## 2023-11-10 RX ORDER — SODIUM CHLORIDE 9 MG/ML
1000 INJECTION, SOLUTION INTRAVENOUS ONCE
Refills: 0 | Status: COMPLETED | OUTPATIENT
Start: 2023-11-10 | End: 2023-11-10

## 2023-11-10 RX ADMIN — MAGNESIUM OXIDE 400 MG ORAL TABLET 400 MILLIGRAM(S): 241.3 TABLET ORAL at 21:21

## 2023-11-10 RX ADMIN — SODIUM CHLORIDE 1000 MILLILITER(S): 9 INJECTION, SOLUTION INTRAVENOUS at 18:54

## 2023-11-10 NOTE — ED PROVIDER NOTE - CLINICAL SUMMARY MEDICAL DECISION MAKING FREE TEXT BOX
We will empirically start IV fluids on this patient, she does appear nontoxic at this time and not tachypneic, has a benign abdominal examination.  Given her history of DKA, this may be an early or mild form, will send labs including beta hydroxybutyrate, urine and venous blood gas.  At this stage in the work-up [facility] for final disposition.

## 2023-11-10 NOTE — ED ADULT NURSE REASSESSMENT NOTE - NS ED NURSE REASSESS COMMENT FT1
pt received from Anthony ROSE noted pending lab results at this time no acute issues noted RR even unlabored s1s2 lungs cta fall and safety precautions in place

## 2023-11-10 NOTE — ED PROVIDER NOTE - NSFOLLOWUPINSTRUCTIONS_ED_ALL_ED_FT
- Follow up with your endocrinologist this week and please bring the results of today's visit with you  - Continue monitoring your glucose levels  - Return to the emergency department for concerning symptoms

## 2023-11-10 NOTE — ED PROVIDER NOTE - PHYSICAL EXAMINATION
Vitals: Hypertensive  Gen: laying comfortably in NAD   Head: NCAT    ENT: EOMI. No exudates  CV: RRR. Audible S1 and S2. No murmurs. 2+ radial and DP pulses   Pulm: Clear to auscultation bilaterally. No wheezes, rales, or rhonchi  Abd: soft, NTND, no rebound, no guarding  Musculoskeletal:  No peripheral edema, no calf ttp  Neurologic: AAOx3, no focal deficits  : no CVA tenderness

## 2023-11-10 NOTE — ED ADULT NURSE NOTE - NSFALLUNIVINTERV_ED_ALL_ED
Bed/Stretcher in lowest position, wheels locked, appropriate side rails in place/Call bell, personal items and telephone in reach/Instruct patient to call for assistance before getting out of bed/chair/stretcher/Non-slip footwear applied when patient is off stretcher/[place] to call system/Physically safe environment - no spills, clutter or unnecessary equipment/Purposeful proactive rounding/Room/bathroom lighting operational, light cord in reach

## 2023-11-10 NOTE — ED PROVIDER NOTE - PATIENT PORTAL LINK FT
You can access the FollowMyHealth Patient Portal offered by [facility] by registering at the following website: [email or web address]. By joining [facility]’s FollowMyHealth portal, you will also be able to view your health information using other applications (apps) compatible with our system.

## 2023-11-10 NOTE — ED PROVIDER NOTE - PROGRESS NOTE DETAILS
Pauline Miller, DO: patient received at sign out pending results. Labs noted. Anion gap of 11. Glucose 289. Mild hypomag - oral repletion given. Elevated A1C to 12.6. CO2 24. pH 7.3, mild acidemia, unlike metabolic cause given nml CO2. Patient has appropriate f/u with endocrinologist. Not in DKA. Stable for dc home to f/u.

## 2023-11-10 NOTE — ED PROVIDER NOTE - OBJECTIVE STATEMENT
61 F history hypertension, asthma, VINICIUS presenting with hyperglycemia, states that she has been somewhat dizzy, felt fatigued as well as had frequent urination and excessive thirst.  She has seen her endocrinologist 5 days ago who recommended she present to the emergency department for glucose approaching 500.  Denies chest pain, shortness of breath or significant abdominal pain.

## 2023-11-11 LAB
B-OH-BUTYR SERPL-SCNC: 0.8 MMOL/L — HIGH
B-OH-BUTYR SERPL-SCNC: 0.8 MMOL/L — HIGH

## 2023-11-13 RX ORDER — PEN NEEDLE, DIABETIC 32GX 5/32"
32G X 4 MM NEEDLE, DISPOSABLE MISCELLANEOUS
Qty: 1 | Refills: 1 | Status: ACTIVE | COMMUNITY
Start: 2023-11-13 | End: 1900-01-01

## 2023-11-16 ENCOUNTER — APPOINTMENT (OUTPATIENT)
Dept: RHEUMATOLOGY | Facility: CLINIC | Age: 63
End: 2023-11-16

## 2023-11-20 RX ORDER — INSULIN GLARGINE 100 [IU]/ML
100 INJECTION, SOLUTION SUBCUTANEOUS
Qty: 1 | Refills: 0 | Status: ACTIVE | COMMUNITY
Start: 2023-11-20 | End: 1900-01-01

## 2023-11-21 RX ORDER — INSULIN GLARGINE 100 [IU]/ML
100 INJECTION, SOLUTION SUBCUTANEOUS AT BEDTIME
Qty: 1 | Refills: 1 | Status: DISCONTINUED | COMMUNITY
Start: 2023-11-13 | End: 2023-11-21

## 2023-12-14 ENCOUNTER — APPOINTMENT (OUTPATIENT)
Dept: CARDIOLOGY | Facility: CLINIC | Age: 63
End: 2023-12-14
Payer: COMMERCIAL

## 2023-12-14 PROCEDURE — 93306 TTE W/DOPPLER COMPLETE: CPT

## 2023-12-15 ENCOUNTER — TRANSCRIPTION ENCOUNTER (OUTPATIENT)
Age: 63
End: 2023-12-15

## 2023-12-27 ENCOUNTER — APPOINTMENT (OUTPATIENT)
Dept: ENDOCRINOLOGY | Facility: CLINIC | Age: 63
End: 2023-12-27

## 2024-02-12 ENCOUNTER — APPOINTMENT (OUTPATIENT)
Dept: CARDIOLOGY | Facility: CLINIC | Age: 64
End: 2024-02-12
Payer: COMMERCIAL

## 2024-02-12 VITALS
HEART RATE: 76 BPM | DIASTOLIC BLOOD PRESSURE: 80 MMHG | HEIGHT: 66 IN | SYSTOLIC BLOOD PRESSURE: 156 MMHG | OXYGEN SATURATION: 95 % | BODY MASS INDEX: 36.32 KG/M2 | WEIGHT: 226 LBS

## 2024-02-12 DIAGNOSIS — R94.31 ABNORMAL ELECTROCARDIOGRAM [ECG] [EKG]: ICD-10-CM

## 2024-02-12 PROCEDURE — 99213 OFFICE O/P EST LOW 20 MIN: CPT

## 2024-02-12 PROCEDURE — 93000 ELECTROCARDIOGRAM COMPLETE: CPT

## 2024-02-16 ENCOUNTER — APPOINTMENT (OUTPATIENT)
Dept: ENDOCRINOLOGY | Facility: CLINIC | Age: 64
End: 2024-02-16
Payer: COMMERCIAL

## 2024-02-16 ENCOUNTER — NON-APPOINTMENT (OUTPATIENT)
Age: 64
End: 2024-02-16

## 2024-02-16 VITALS
HEIGHT: 66 IN | DIASTOLIC BLOOD PRESSURE: 82 MMHG | SYSTOLIC BLOOD PRESSURE: 132 MMHG | HEART RATE: 57 BPM | WEIGHT: 222 LBS | BODY MASS INDEX: 35.68 KG/M2

## 2024-02-16 DIAGNOSIS — E55.9 VITAMIN D DEFICIENCY, UNSPECIFIED: ICD-10-CM

## 2024-02-16 DIAGNOSIS — E78.00 PURE HYPERCHOLESTEROLEMIA, UNSPECIFIED: ICD-10-CM

## 2024-02-16 DIAGNOSIS — E11.9 TYPE 2 DIABETES MELLITUS W/OUT COMPLICATIONS: ICD-10-CM

## 2024-02-16 DIAGNOSIS — I10 ESSENTIAL (PRIMARY) HYPERTENSION: ICD-10-CM

## 2024-02-16 PROBLEM — R94.31 ABNORMAL EKG: Status: ACTIVE | Noted: 2023-11-13

## 2024-02-16 LAB — GLUCOSE BLDC GLUCOMTR-MCNC: 119

## 2024-02-16 PROCEDURE — 82962 GLUCOSE BLOOD TEST: CPT

## 2024-02-16 PROCEDURE — 99214 OFFICE O/P EST MOD 30 MIN: CPT

## 2024-02-16 RX ORDER — OMEGA-3/DHA/EPA/FISH OIL 300-1000MG
CAPSULE ORAL
Refills: 0 | Status: ACTIVE | COMMUNITY

## 2024-02-16 RX ORDER — BLOOD-GLUCOSE SENSOR
EACH MISCELLANEOUS
Qty: 2 | Refills: 3 | Status: ACTIVE | COMMUNITY
Start: 2024-02-16 | End: 1900-01-01
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
## 2024-02-16 NOTE — REVIEW OF SYSTEMS
[Fatigue] : fatigue [Recent Weight Loss (___ Lbs)] : recent weight loss: [unfilled] lbs [Recent Weight Gain (___ Lbs)] : no recent weight gain [Visual Field Defect] : no visual field defect [Blurred Vision] : no blurred vision [Dysphagia] : no dysphagia [Neck Pain] : no neck pain [Dysphonia] : no dysphonia [Chest Pain] : no chest pain [Shortness Of Breath] : no shortness of breath [Constipation] : no constipation [Diarrhea] : no diarrhea [Polyuria] : no polyuria [Polydipsia] : no polydipsia

## 2024-02-16 NOTE — CARDIOLOGY SUMMARY
[de-identified] : Sinus Rhythm @ 91 bpm -frequent ectopic ventricular beats  -Right bundle branch block with left axis -bifascicular block. -Anterior infarct -age undetermined -Old inferior infarct. [de-identified] : 1. Normal myocardial perfusion scan, with no evidence of infarction or inducible ischemia. 2. The patient underwent stress testing using the standard Mannie protocol. _ The patient exercised for 5 min 15 sec. _ The test was stopped due to patient request and completion of protocol. _ The peak heart rate was 153 bpm; 98 % of predicted maximal heart rate for this patient. _ The patient achieved 6 METS which is consistent with fair exercise capacity. 3. Baseline electrocardiogram: sinus rhythm at a rate of 74 bpm with right bundle branch block, left anterior fascicular block, poor R-wave progression and evidence of prior inferior infarct. 4. Stress electrocardiogram: No significant ischemic ST segment changes. Electrocardiogram ischemic changes: Unable to assess for st/t changes due to baseline bundle branck block. 5. Normal left ventricular regional wall motion. 6. Patient achieved 6 METS, which is consistent with fair exercise capacity. 7. The left ventricle is normal in function. The left ventricular EF% during stress is 74 %. The stress end diastolic volume is 87 ml and systolic volume is 23 ml. 8. Arrhythmias: Rare VPD's and APD's occurred during stress and recovery. [de-identified] : 1. Technically difficult image quality. 2. The left atrium is normal. 3. Left ventricular systolic function is normal with an ejection fraction of 66 % by Centeno's method of disks. 4. Normal left ventricular diastolic function. 5. The right atrium is normal in size. 6. Normal right ventricular cavity size and systolic function. 7. No significant valvular disease. 8. No pericardial effusion seen.

## 2024-02-16 NOTE — REASON FOR VISIT
[Symptom and Test Evaluation] : symptom and test evaluation [FreeTextEntry1] : 62 y/o F with PMH of HTN, Dyslipidemia, DM2, asthma, s/p Thyroidectomy 15 yrs ago presents for follow up   Patient notes that she has been doing well with no complaints of any chest pain or shortness of breath at rest or upon exertion; no lower extremity edema, no palpitations dizziness or lightheadedness  Blood pressure elevated in the office and repeated blood pressure 130/86, Compliant with antihypertensives.

## 2024-02-16 NOTE — ASSESSMENT
[FreeTextEntry1] : 64 y/o F with PMH of HTN, Dyslipidemia,  DM2, asthma, s/p Thyroidectomy 15 yrs ago presents for evaluation of exertional shortness of breath   1) Exertional shortness of breath, intermediate risk for CAD with hx of HTN, Dyslipidemia and DM2, no recurrence  - patient notes no chest pain or shortness of breath  - EKG Sinus Rhythm @ 91 bpm -frequent ectopic ventricular beats  -Right bundle branch block with left axis -bifascicular block. -Anterior infarct -age undetermined -Old inferior infarct. -NM Myocardial shows no evidence of ischemia or infarct  - LVEF 66%  2) HTN  - blood pressure mildly elevated and repeated 130/82  - on Norvasc 10mg Losartan -HCTZ 100-25 mg po q daily and Coreg 12.5mg po q 12hrs   Encourage dietary and lifestyle modification   Jewell Jones D.O. [facility] Cardiology/Vascular Cardiology -[facility] Telephone # [phone]

## 2024-02-16 NOTE — ASSESSMENT
[FreeTextEntry1] : T2DM -Pt had an episode (approx 2 weeks) where she had very high blood sugars , sent her to ER, unfounded. Appears she is doing much better since then but needs updated HGA1C.  -Continue  mg ER daily BID .  -Pt not interested in GLP1 at this time. -Continue Claus use -Continue to watch diet -Continue to exercise as tolerated, goal of 30 mins a day 5 x a week -Continue to follow up with all specialists including ophtho    Partial thyroidectomy -Continue to monitor TFTs, on no medication for now -No need for updated thyroid sonogram at this time   HTN -BP slightly elevated in office, continue regimen, will continue to monitor    Vit D Def -Continue daily supplement, levels low normal   Slightly elevated LDL -Pt will work on diet/exercise efforts, will recheck with next fasting labs, if not improved may consider statin due to FH of MI.   Fasting labs due now RTO 3 months NP, 6 months MD

## 2024-03-15 ENCOUNTER — APPOINTMENT (OUTPATIENT)
Dept: RHEUMATOLOGY | Facility: CLINIC | Age: 64
End: 2024-03-15

## 2024-03-17 ENCOUNTER — TRANSCRIPTION ENCOUNTER (OUTPATIENT)
Age: 64
End: 2024-03-17

## 2024-03-18 ENCOUNTER — OUTPATIENT (OUTPATIENT)
Dept: OUTPATIENT SERVICES | Facility: HOSPITAL | Age: 64
LOS: 1 days | End: 2024-03-18
Payer: COMMERCIAL

## 2024-03-18 ENCOUNTER — APPOINTMENT (OUTPATIENT)
Dept: GASTROENTEROLOGY | Facility: GI CENTER | Age: 64
End: 2024-03-18
Payer: COMMERCIAL

## 2024-03-18 DIAGNOSIS — K64.8 OTHER HEMORRHOIDS: ICD-10-CM

## 2024-03-18 DIAGNOSIS — Z86.010 PERSONAL HISTORY OF COLONIC POLYPS: ICD-10-CM

## 2024-03-18 DIAGNOSIS — Z90.49 ACQUIRED ABSENCE OF OTHER SPECIFIED PARTS OF DIGESTIVE TRACT: Chronic | ICD-10-CM

## 2024-03-18 DIAGNOSIS — Z90.710 ACQUIRED ABSENCE OF BOTH CERVIX AND UTERUS: Chronic | ICD-10-CM

## 2024-03-18 DIAGNOSIS — K57.30 DIVERTICULOSIS OF LARGE INTESTINE W/OUT PERFORATION OR ABSCESS W/OUT BLEEDING: ICD-10-CM

## 2024-03-18 DIAGNOSIS — Z98.89 OTHER SPECIFIED POSTPROCEDURAL STATES: Chronic | ICD-10-CM

## 2024-03-18 LAB — GLUCOSE BLDC GLUCOMTR-MCNC: 127 MG/DL — HIGH (ref 70–99)

## 2024-03-18 PROCEDURE — G0105: CPT

## 2024-03-18 PROCEDURE — 45378 DIAGNOSTIC COLONOSCOPY: CPT | Mod: 33

## 2024-03-18 PROCEDURE — 82962 GLUCOSE BLOOD TEST: CPT

## 2024-03-18 NOTE — PHYSICAL EXAM
[Alert] : alert [Normal Voice/Communication] : normal voice/communication [Healthy Appearing] : healthy appearing [Well Developed] : well developed [No Acute Distress] : no acute distress [Well Nourished] : well nourished [Obese (BMI >= 30)] : obese (BMI >= 30) [Sclera] : the sclera and conjunctiva were normal [Normal Lips/Gums] : the lips and gums were normal [Hearing Threshold Finger Rub Not Dimmit] : hearing was normal [Oropharynx] : the oropharynx was normal [Normal Appearance] : the appearance of the neck was normal [No Neck Mass] : no neck mass was observed [No Respiratory Distress] : no respiratory distress [No Acc Muscle Use] : no accessory muscle use [Respiration, Rhythm And Depth] : normal respiratory rhythm and effort [Auscultation Breath Sounds / Voice Sounds] : lungs were clear to auscultation bilaterally [Heart Rate And Rhythm] : heart rate was normal and rhythm regular [Normal S1, S2] : normal S1 and S2 [Murmurs] : no murmurs [None] : no edema [Bowel Sounds] : normal bowel sounds [Abdomen Tenderness] : non-tender [No Masses] : no abdominal mass palpated [Abdomen Soft] : soft [] : no hepatosplenomegaly [No CVA Tenderness] : no CVA  tenderness [Abnormal Walk] : normal gait [No Joint Swelling] : no joint swelling seen [Normal Color / Pigmentation] : normal skin color and pigmentation [Oriented To Time, Place, And Person] : oriented to person, place, and time [de-identified] : Deferred until colonoscopy

## 2024-03-19 LAB
25(OH)D3 SERPL-MCNC: 29.3 NG/ML
ALBUMIN SERPL ELPH-MCNC: 4.4 G/DL
ALP BLD-CCNC: 86 U/L
ALT SERPL-CCNC: 10 U/L
ANION GAP SERPL CALC-SCNC: 11 MMOL/L
AST SERPL-CCNC: 15 U/L
BASOPHILS # BLD AUTO: 0.06 K/UL
BASOPHILS NFR BLD AUTO: 1 %
BILIRUB SERPL-MCNC: 0.3 MG/DL
BUN SERPL-MCNC: 13 MG/DL
CALCIUM SERPL-MCNC: 9.7 MG/DL
CHLORIDE SERPL-SCNC: 106 MMOL/L
CHOLEST SERPL-MCNC: 147 MG/DL
CO2 SERPL-SCNC: 28 MMOL/L
CREAT SERPL-MCNC: 0.73 MG/DL
EGFR: 92 ML/MIN/1.73M2
EOSINOPHIL # BLD AUTO: 0.38 K/UL
EOSINOPHIL NFR BLD AUTO: 6.6 %
ESTIMATED AVERAGE GLUCOSE: 177 MG/DL
GLUCOSE SERPL-MCNC: 99 MG/DL
HBA1C MFR BLD HPLC: 7.8 %
HCT VFR BLD CALC: 38.2 %
HDLC SERPL-MCNC: 52 MG/DL
HGB BLD-MCNC: 12.3 G/DL
IMM GRANULOCYTES NFR BLD AUTO: 0.2 %
LDLC SERPL CALC-MCNC: 83 MG/DL
LYMPHOCYTES # BLD AUTO: 2.2 K/UL
LYMPHOCYTES NFR BLD AUTO: 38.5 %
MAN DIFF?: NORMAL
MCHC RBC-ENTMCNC: 29.2 PG
MCHC RBC-ENTMCNC: 32.2 GM/DL
MCV RBC AUTO: 90.7 FL
MONOCYTES # BLD AUTO: 0.42 K/UL
MONOCYTES NFR BLD AUTO: 7.3 %
NEUTROPHILS # BLD AUTO: 2.65 K/UL
NEUTROPHILS NFR BLD AUTO: 46.4 %
NONHDLC SERPL-MCNC: 94 MG/DL
PLATELET # BLD AUTO: 348 K/UL
POTASSIUM SERPL-SCNC: 4.8 MMOL/L
PROT SERPL-MCNC: 7.8 G/DL
RBC # BLD: 4.21 M/UL
RBC # FLD: 13.7 %
SODIUM SERPL-SCNC: 145 MMOL/L
T3FREE SERPL-MCNC: 2.61 PG/ML
T4 FREE SERPL-MCNC: 1.2 NG/DL
TRIGL SERPL-MCNC: 54 MG/DL
TSH SERPL-ACNC: 2.1 UIU/ML
VIT B12 SERPL-MCNC: 673 PG/ML
WBC # FLD AUTO: 5.72 K/UL

## 2024-03-26 ENCOUNTER — NON-APPOINTMENT (OUTPATIENT)
Age: 64
End: 2024-03-26

## 2024-03-27 ENCOUNTER — TRANSCRIPTION ENCOUNTER (OUTPATIENT)
Age: 64
End: 2024-03-27

## 2024-05-17 ENCOUNTER — APPOINTMENT (OUTPATIENT)
Dept: ENDOCRINOLOGY | Facility: CLINIC | Age: 64
End: 2024-05-17

## 2024-06-11 RX ORDER — METFORMIN ER 500 MG 500 MG/1
500 TABLET ORAL
Qty: 180 | Refills: 1 | Status: ACTIVE | COMMUNITY
Start: 2022-05-11 | End: 1900-01-01

## 2024-08-22 ENCOUNTER — APPOINTMENT (OUTPATIENT)
Dept: ENDOCRINOLOGY | Facility: CLINIC | Age: 64
End: 2024-08-22

## 2024-08-28 ENCOUNTER — APPOINTMENT (OUTPATIENT)
Dept: CARDIOLOGY | Facility: CLINIC | Age: 64
End: 2024-08-28

## 2024-09-04 ENCOUNTER — NON-APPOINTMENT (OUTPATIENT)
Age: 64
End: 2024-09-04

## 2024-09-05 ENCOUNTER — APPOINTMENT (OUTPATIENT)
Age: 64
End: 2024-09-05
Payer: COMMERCIAL

## 2024-09-05 VITALS
BODY MASS INDEX: 36.13 KG/M2 | HEIGHT: 67 IN | OXYGEN SATURATION: 99 % | SYSTOLIC BLOOD PRESSURE: 181 MMHG | DIASTOLIC BLOOD PRESSURE: 93 MMHG | TEMPERATURE: 97.5 F | HEART RATE: 85 BPM | WEIGHT: 230.2 LBS

## 2024-09-05 DIAGNOSIS — M54.16 RADICULOPATHY, LUMBAR REGION: ICD-10-CM

## 2024-09-05 PROCEDURE — 99203 OFFICE O/P NEW LOW 30 MIN: CPT
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
## 2024-10-11 ENCOUNTER — APPOINTMENT (OUTPATIENT)
Dept: ENDOCRINOLOGY | Facility: CLINIC | Age: 64
End: 2024-10-11

## 2024-10-14 ENCOUNTER — APPOINTMENT (OUTPATIENT)
Dept: NEUROSURGERY | Facility: CLINIC | Age: 64
End: 2024-10-14

## 2024-10-31 ENCOUNTER — APPOINTMENT (OUTPATIENT)
Dept: ENDOCRINOLOGY | Facility: CLINIC | Age: 64
End: 2024-10-31

## 2025-05-13 NOTE — REASON FOR VISIT
Pre-charting complete.     Annual/last pap 1/14/22 neg/neg  Mammo done 4/18/25  MRI done 5/12/25  Colonoscopy done 11/2023   [Follow - Up] : a follow-up visit [DM Type 2] : DM Type 2

## 2025-06-24 NOTE — ED PROVIDER NOTE - NS ED MD TWO NIGHTS YN
RESEARCH PROGRESS NOTE  Study Title: CIRCL [place] Hypertension Study  IRB #:[id number]  Brief Description: CIRCL: Community Intervention to Reduce Cardiovascular Disease in [place]  PI: Jamel Lindsay PhD.    The informed consent was reviewed page by page with the patient. The following were discussed and reviewed with the patient: purpose of study, investigational nature, randomization, procedure, follow-up, risks, benefits, alternative therapy, voluntary participation, right to refuse participation without consequences to continued care or access, confidentiality/privacy.     Patient verbalizes understanding and acknowledges all their questions have been answered.  Patient confirms adequate time to review informed consent document. Patient provided ample time to make a decision.    Patient signed the research informed consent document and HIPAA authorization.      Outcome: Subject/Parent/Guardian/Child verbalizes willingness to participate and agrees to participate if all study eligibility criteria are met.       No study related procedures were performed prior to signing the study informed consent. A copy of the signed informed consent was given to the patient and a copy was placed in patient medical record chart.    Optional Addendum <<Delete in not applicable>>: Subject/child is enrolled       Subject updated.       Coordinator contact:  Dotty Ross  
Yes

## (undated) DEVICE — KIT VALVE DEFENDO

## (undated) DEVICE — STERIS DEFENDO 3-PIECE KIT (AIR/WATER, SUCTION & BIOPSY VALVES)